# Patient Record
Sex: FEMALE | Race: WHITE | NOT HISPANIC OR LATINO | Employment: OTHER | URBAN - METROPOLITAN AREA
[De-identification: names, ages, dates, MRNs, and addresses within clinical notes are randomized per-mention and may not be internally consistent; named-entity substitution may affect disease eponyms.]

---

## 2017-04-04 ENCOUNTER — COMMUNICATION - HEALTHEAST (OUTPATIENT)
Dept: FAMILY MEDICINE | Facility: CLINIC | Age: 62
End: 2017-04-04

## 2017-06-05 ENCOUNTER — RECORDS - HEALTHEAST (OUTPATIENT)
Dept: ADMINISTRATIVE | Facility: OTHER | Age: 62
End: 2017-06-05

## 2017-06-19 ENCOUNTER — HOSPITAL ENCOUNTER (OUTPATIENT)
Dept: MAMMOGRAPHY | Facility: HOSPITAL | Age: 62
Discharge: HOME | End: 2017-06-19
Attending: OBSTETRICS & GYNECOLOGY

## 2017-06-19 DIAGNOSIS — Z12.31 VISIT FOR SCREENING MAMMOGRAM: ICD-10-CM

## 2017-06-20 ENCOUNTER — COMMUNICATION - HEALTHEAST (OUTPATIENT)
Dept: TELEHEALTH | Facility: CLINIC | Age: 62
End: 2017-06-20

## 2017-06-20 ENCOUNTER — OFFICE VISIT - HEALTHEAST (OUTPATIENT)
Dept: FAMILY MEDICINE | Facility: CLINIC | Age: 62
End: 2017-06-20

## 2017-06-20 ENCOUNTER — RECORDS - HEALTHEAST (OUTPATIENT)
Dept: ADMINISTRATIVE | Facility: OTHER | Age: 62
End: 2017-06-20

## 2017-06-20 ENCOUNTER — COMMUNICATION - HEALTHEAST (OUTPATIENT)
Dept: FAMILY MEDICINE | Facility: CLINIC | Age: 62
End: 2017-06-20

## 2017-06-20 DIAGNOSIS — Z13.0 SCREENING, ANEMIA, DEFICIENCY, IRON: ICD-10-CM

## 2017-06-20 DIAGNOSIS — Z13.29 SCREENING FOR THYROID DISORDER: ICD-10-CM

## 2017-06-20 DIAGNOSIS — Z13.228 SCREENING FOR ENDOCRINE, METABOLIC AND IMMUNITY DISORDER: ICD-10-CM

## 2017-06-20 DIAGNOSIS — E87.8 HYPERCHLOREMIA: ICD-10-CM

## 2017-06-20 DIAGNOSIS — Z98.890 H/O COLONOSCOPY: ICD-10-CM

## 2017-06-20 DIAGNOSIS — B02.9 SHINGLES: ICD-10-CM

## 2017-06-20 DIAGNOSIS — E87.5 HYPERKALEMIA: ICD-10-CM

## 2017-06-20 DIAGNOSIS — Z13.0 SCREENING FOR ENDOCRINE, METABOLIC AND IMMUNITY DISORDER: ICD-10-CM

## 2017-06-20 DIAGNOSIS — Z78.0 MENOPAUSE: ICD-10-CM

## 2017-06-20 DIAGNOSIS — Z13.220 LIPID SCREENING: ICD-10-CM

## 2017-06-20 DIAGNOSIS — Z00.00 PREVENTATIVE HEALTH CARE: ICD-10-CM

## 2017-06-20 DIAGNOSIS — Z13.220 SCREENING, LIPID: ICD-10-CM

## 2017-06-20 DIAGNOSIS — Z13.29 SCREENING FOR ENDOCRINE, METABOLIC AND IMMUNITY DISORDER: ICD-10-CM

## 2017-06-20 DIAGNOSIS — M85.80 OSTEOPENIA: ICD-10-CM

## 2017-06-20 DIAGNOSIS — Z12.4 SCREENING FOR MALIGNANT NEOPLASM OF CERVIX: ICD-10-CM

## 2017-06-20 DIAGNOSIS — Z90.710 S/P HYSTERECTOMY: ICD-10-CM

## 2017-06-20 LAB
CHOLEST SERPL-MCNC: 204 MG/DL
FASTING STATUS PATIENT QL REPORTED: YES
HDLC SERPL-MCNC: 58 MG/DL
LDLC SERPL CALC-MCNC: 128 MG/DL
TRIGL SERPL-MCNC: 92 MG/DL

## 2017-06-20 ASSESSMENT — MIFFLIN-ST. JEOR: SCORE: 1281.17

## 2017-06-20 NOTE — ASSESSMENT & PLAN NOTE
She reports colonoscopy done Aug 2016 and states that she needs to get a colonoscopy every 5 years.

## 2017-06-20 NOTE — ASSESSMENT & PLAN NOTE
Pap: several years ago, results were normal.   Mammo: done yesterday, result pending  Colonoscopy: q 5 yr. Done aug 2016   Std testing desired:  offered  Osteoporosis prevention discussed.  vitamin d levels ordered. Recommend daily calcium and vitamin d intake to keep good bone health. Recommend weight bearing exercise, no tobacco, and limit alcohol  dexa recommended as none on file.   Recommend sunscreen, exercise, & healthy diet.  Offered tsh, glucose, hgb, lipid  I have had an Advance Directives discussion with the patient.   Body mass index is 26.79 kg/(m^2).   mychart offered.

## 2017-06-21 ENCOUNTER — HOSPITAL ENCOUNTER (OUTPATIENT)
Dept: MAMMOGRAPHY | Facility: HOSPITAL | Age: 62
Discharge: HOME | End: 2017-06-21
Attending: OBSTETRICS & GYNECOLOGY

## 2017-06-21 DIAGNOSIS — N64.89 BREAST ASYMMETRY: ICD-10-CM

## 2017-06-22 ENCOUNTER — COMMUNICATION - HEALTHEAST (OUTPATIENT)
Dept: FAMILY MEDICINE | Facility: CLINIC | Age: 62
End: 2017-06-22

## 2017-07-10 ENCOUNTER — COMMUNICATION - HEALTHEAST (OUTPATIENT)
Dept: FAMILY MEDICINE | Facility: CLINIC | Age: 62
End: 2017-07-10

## 2017-07-10 ENCOUNTER — AMBULATORY - HEALTHEAST (OUTPATIENT)
Dept: LAB | Facility: CLINIC | Age: 62
End: 2017-07-10

## 2017-07-10 DIAGNOSIS — E87.8 HYPERCHLOREMIA: ICD-10-CM

## 2017-07-11 ENCOUNTER — RECORDS - HEALTHEAST (OUTPATIENT)
Dept: BONE DENSITY | Facility: CLINIC | Age: 62
End: 2017-07-11

## 2017-07-11 ENCOUNTER — RECORDS - HEALTHEAST (OUTPATIENT)
Dept: ADMINISTRATIVE | Facility: OTHER | Age: 62
End: 2017-07-11

## 2017-07-11 DIAGNOSIS — Z78.0 ASYMPTOMATIC MENOPAUSAL STATE: ICD-10-CM

## 2017-07-20 ENCOUNTER — COMMUNICATION - HEALTHEAST (OUTPATIENT)
Dept: FAMILY MEDICINE | Facility: CLINIC | Age: 62
End: 2017-07-20

## 2018-03-02 ENCOUNTER — RECORDS - HEALTHEAST (OUTPATIENT)
Dept: ADMINISTRATIVE | Facility: OTHER | Age: 63
End: 2018-03-02

## 2018-03-05 ENCOUNTER — RECORDS - HEALTHEAST (OUTPATIENT)
Dept: ADMINISTRATIVE | Facility: OTHER | Age: 63
End: 2018-03-05

## 2018-03-14 ENCOUNTER — RECORDS - HEALTHEAST (OUTPATIENT)
Dept: ADMINISTRATIVE | Facility: OTHER | Age: 63
End: 2018-03-14

## 2018-04-02 ENCOUNTER — RECORDS - HEALTHEAST (OUTPATIENT)
Dept: ADMINISTRATIVE | Facility: OTHER | Age: 63
End: 2018-04-02

## 2018-05-10 ENCOUNTER — RECORDS - HEALTHEAST (OUTPATIENT)
Dept: ADMINISTRATIVE | Facility: OTHER | Age: 63
End: 2018-05-10

## 2018-05-31 ENCOUNTER — RECORDS - HEALTHEAST (OUTPATIENT)
Dept: ADMINISTRATIVE | Facility: OTHER | Age: 63
End: 2018-05-31

## 2018-06-21 ENCOUNTER — RECORDS - HEALTHEAST (OUTPATIENT)
Dept: ADMINISTRATIVE | Facility: OTHER | Age: 63
End: 2018-06-21

## 2018-07-20 ENCOUNTER — RECORDS - HEALTHEAST (OUTPATIENT)
Dept: ADMINISTRATIVE | Facility: OTHER | Age: 63
End: 2018-07-20

## 2018-07-27 ENCOUNTER — RECORDS - HEALTHEAST (OUTPATIENT)
Dept: ADMINISTRATIVE | Facility: OTHER | Age: 63
End: 2018-07-27

## 2018-08-27 ENCOUNTER — RECORDS - HEALTHEAST (OUTPATIENT)
Dept: ADMINISTRATIVE | Facility: OTHER | Age: 63
End: 2018-08-27

## 2018-09-05 ENCOUNTER — RECORDS - HEALTHEAST (OUTPATIENT)
Dept: ADMINISTRATIVE | Facility: OTHER | Age: 63
End: 2018-09-05

## 2018-09-22 ENCOUNTER — HOSPITAL ENCOUNTER (OUTPATIENT)
Dept: MAMMOGRAPHY | Facility: CLINIC | Age: 63
Discharge: HOME | End: 2018-09-22
Attending: OBSTETRICS & GYNECOLOGY

## 2018-09-22 DIAGNOSIS — Z12.31 VISIT FOR SCREENING MAMMOGRAM: ICD-10-CM

## 2018-09-24 ENCOUNTER — RECORDS - HEALTHEAST (OUTPATIENT)
Dept: ADMINISTRATIVE | Facility: OTHER | Age: 63
End: 2018-09-24

## 2018-09-25 ENCOUNTER — HOSPITAL ENCOUNTER (OUTPATIENT)
Dept: MAMMOGRAPHY | Facility: CLINIC | Age: 63
Discharge: HOME | End: 2018-09-25
Attending: OBSTETRICS & GYNECOLOGY

## 2018-09-25 DIAGNOSIS — R92.30 BREAST DENSITY: ICD-10-CM

## 2018-10-17 ENCOUNTER — RECORDS - HEALTHEAST (OUTPATIENT)
Dept: ADMINISTRATIVE | Facility: OTHER | Age: 63
End: 2018-10-17

## 2018-10-30 ENCOUNTER — OFFICE VISIT - HEALTHEAST (OUTPATIENT)
Dept: FAMILY MEDICINE | Facility: CLINIC | Age: 63
End: 2018-10-30

## 2018-10-30 DIAGNOSIS — M85.80 OSTEOPENIA: ICD-10-CM

## 2018-10-30 DIAGNOSIS — Z13.0 SCREENING FOR ENDOCRINE, NUTRITIONAL, METABOLIC AND IMMUNITY DISORDER: ICD-10-CM

## 2018-10-30 DIAGNOSIS — K76.0 HEPATIC STEATOSIS: ICD-10-CM

## 2018-10-30 DIAGNOSIS — Z13.29 SCREENING FOR ENDOCRINE, NUTRITIONAL, METABOLIC AND IMMUNITY DISORDER: ICD-10-CM

## 2018-10-30 DIAGNOSIS — Z00.00 PREVENTATIVE HEALTH CARE: ICD-10-CM

## 2018-10-30 DIAGNOSIS — Z13.0 SCREENING, ANEMIA, DEFICIENCY, IRON: ICD-10-CM

## 2018-10-30 DIAGNOSIS — Z13.228 SCREENING FOR METABOLIC DISORDER: ICD-10-CM

## 2018-10-30 DIAGNOSIS — R63.5 ABNORMAL WEIGHT GAIN: ICD-10-CM

## 2018-10-30 DIAGNOSIS — Z13.21 SCREENING FOR ENDOCRINE, NUTRITIONAL, METABOLIC AND IMMUNITY DISORDER: ICD-10-CM

## 2018-10-30 DIAGNOSIS — Z13.220 SCREENING, LIPID: ICD-10-CM

## 2018-10-30 DIAGNOSIS — Z23 IMMUNIZATION DUE: ICD-10-CM

## 2018-10-30 DIAGNOSIS — Z98.890 H/O COLONOSCOPY: ICD-10-CM

## 2018-10-30 DIAGNOSIS — J30.9 ALLERGIC RHINITIS, UNSPECIFIED SEASONALITY, UNSPECIFIED TRIGGER: ICD-10-CM

## 2018-10-30 DIAGNOSIS — M25.532 LEFT WRIST PAIN: ICD-10-CM

## 2018-10-30 DIAGNOSIS — E87.8 HYPERCHLOREMIA: ICD-10-CM

## 2018-10-30 DIAGNOSIS — Z13.228 SCREENING FOR ENDOCRINE, NUTRITIONAL, METABOLIC AND IMMUNITY DISORDER: ICD-10-CM

## 2018-10-30 LAB
ALBUMIN SERPL-MCNC: 4 G/DL (ref 3.5–5)
ALP SERPL-CCNC: 71 U/L (ref 45–120)
ALT SERPL W P-5'-P-CCNC: <9 U/L (ref 0–45)
ANION GAP SERPL CALCULATED.3IONS-SCNC: 12 MMOL/L (ref 5–18)
AST SERPL W P-5'-P-CCNC: 15 U/L (ref 0–40)
BASOPHILS # BLD AUTO: 0.1 THOU/UL (ref 0–0.2)
BASOPHILS NFR BLD AUTO: 1 % (ref 0–2)
BILIRUB SERPL-MCNC: 0.3 MG/DL (ref 0–1)
BUN SERPL-MCNC: 18 MG/DL (ref 8–22)
CALCIUM SERPL-MCNC: 10 MG/DL (ref 8.5–10.5)
CHLORIDE BLD-SCNC: 107 MMOL/L (ref 98–107)
CHOLEST SERPL-MCNC: 249 MG/DL
CO2 SERPL-SCNC: 22 MMOL/L (ref 22–31)
CREAT SERPL-MCNC: 0.67 MG/DL (ref 0.6–1.1)
EOSINOPHIL # BLD AUTO: 0.1 THOU/UL (ref 0–0.4)
EOSINOPHIL NFR BLD AUTO: 1 % (ref 0–6)
ERYTHROCYTE [DISTWIDTH] IN BLOOD BY AUTOMATED COUNT: 11.4 % (ref 11–14.5)
FASTING STATUS PATIENT QL REPORTED: YES
GFR SERPL CREATININE-BSD FRML MDRD: >60 ML/MIN/1.73M2
GLUCOSE BLD-MCNC: 117 MG/DL (ref 70–125)
HCT VFR BLD AUTO: 41.7 % (ref 35–47)
HDLC SERPL-MCNC: 79 MG/DL
HGB BLD-MCNC: 13.7 G/DL (ref 12–16)
LDLC SERPL CALC-MCNC: 157 MG/DL
LYMPHOCYTES # BLD AUTO: 1.5 THOU/UL (ref 0.8–4.4)
LYMPHOCYTES NFR BLD AUTO: 20 % (ref 20–40)
MCH RBC QN AUTO: 29.4 PG (ref 27–34)
MCHC RBC AUTO-ENTMCNC: 32.8 G/DL (ref 32–36)
MCV RBC AUTO: 89 FL (ref 80–100)
MONOCYTES # BLD AUTO: 0.3 THOU/UL (ref 0–0.9)
MONOCYTES NFR BLD AUTO: 4 % (ref 2–10)
NEUTROPHILS # BLD AUTO: 5.6 THOU/UL (ref 2–7.7)
NEUTROPHILS NFR BLD AUTO: 75 % (ref 50–70)
PLATELET # BLD AUTO: 338 THOU/UL (ref 140–440)
PMV BLD AUTO: 6.7 FL (ref 7–10)
POTASSIUM BLD-SCNC: 4.8 MMOL/L (ref 3.5–5)
PROT SERPL-MCNC: 7.3 G/DL (ref 6–8)
RBC # BLD AUTO: 4.66 MILL/UL (ref 3.8–5.4)
SODIUM SERPL-SCNC: 141 MMOL/L (ref 136–145)
TRIGL SERPL-MCNC: 64 MG/DL
TSH SERPL DL<=0.005 MIU/L-ACNC: 0.67 UIU/ML (ref 0.3–5)
WBC: 7.6 THOU/UL (ref 4–11)

## 2018-10-30 ASSESSMENT — MIFFLIN-ST. JEOR: SCORE: 1290.24

## 2018-10-30 NOTE — ASSESSMENT & PLAN NOTE
Colonoscopy done in August 2016 and was normal.  It was recommended that it be repeated in 5 years-August 2021.

## 2018-10-30 NOTE — ASSESSMENT & PLAN NOTE
She uses Zyrtec as needed.  Currently she is taking prednisone and so she has not been using her Zyrtec because the prednisone works even better than the Zyrtec.  We talked about the fact that Zyrtec is weight positive and that she should use it sparingly.  An alternative weight neutral medication would be a saline nasal rinse that may be adequate for her symptoms.  She will keep this in mind.

## 2018-10-30 NOTE — ASSESSMENT & PLAN NOTE
Flu shot -recommended  Pap: -She had a hysterectomy due to uterine prolapse so no Pap smear is needed peer  Mammo: She had a normal mammogram in August 2018.  She sees Dr. Baker for all her gynecological and breast needs.  Colonoscopy: Colonoscopy is due in August 2021.  Std testing desired: Declined-offered  Osteoporosis prevention discussed.  vitamin d levels ordered. Recommend daily calcium and vitamin d intake to keep good bone health. Recommend weight bearing exercise, no tobacco, and limit alcohol  dexa -see osteopenia in the problem list  Recommend sunscreen, exercise, & healthy diet.  Offered tsh, glucose, hgb, lipid  I have had an Advance Directives discussion with the patient.   Body mass index is 27.12 kg/(m^2).   mychart offered.

## 2018-10-30 NOTE — ASSESSMENT & PLAN NOTE
On chart review today I do see an ultrasound that reveals hepatic steatosis.  I have recommended that she increase vegetable intake and decrease sugar and processed food intake to try to improvethis.  In addition because she has been gaining weight on gabapentin, prednisone and as needed Zyrtec I have recommended that she use some off label metformin to baldemar to the weight gain with these drugs.

## 2018-10-31 ENCOUNTER — COMMUNICATION - HEALTHEAST (OUTPATIENT)
Dept: FAMILY MEDICINE | Facility: CLINIC | Age: 63
End: 2018-10-31

## 2018-10-31 ENCOUNTER — AMBULATORY - HEALTHEAST (OUTPATIENT)
Dept: NURSING | Facility: CLINIC | Age: 63
End: 2018-10-31

## 2018-10-31 DIAGNOSIS — Z23 NEED FOR VACCINATION: ICD-10-CM

## 2018-10-31 LAB — 25(OH)D3 SERPL-MCNC: 33 NG/ML (ref 30–80)

## 2018-11-27 ENCOUNTER — OFFICE VISIT - HEALTHEAST (OUTPATIENT)
Dept: FAMILY MEDICINE | Facility: CLINIC | Age: 63
End: 2018-11-27

## 2018-11-27 DIAGNOSIS — G89.29 CHRONIC RIGHT-SIDED THORACIC BACK PAIN: ICD-10-CM

## 2018-11-27 DIAGNOSIS — M54.6 CHRONIC RIGHT-SIDED THORACIC BACK PAIN: ICD-10-CM

## 2018-11-27 DIAGNOSIS — E87.8 HYPERCHLOREMIA: ICD-10-CM

## 2018-11-27 DIAGNOSIS — Z00.00 PREVENTATIVE HEALTH CARE: ICD-10-CM

## 2018-11-27 DIAGNOSIS — Z23 NEED FOR SHINGLES VACCINE: ICD-10-CM

## 2018-11-27 DIAGNOSIS — K76.0 HEPATIC STEATOSIS: ICD-10-CM

## 2018-11-27 DIAGNOSIS — E78.5 DYSLIPIDEMIA: ICD-10-CM

## 2018-11-27 NOTE — ASSESSMENT & PLAN NOTE
Lipids reviewed. Statin decision aid discussed. She could reduce her risk from 3% in 10 years to 2% in 10 years with high dose statin.At this point will watch and wait.

## 2018-11-27 NOTE — ASSESSMENT & PLAN NOTE
Labs reviewed today: 10/2018 d 33, tsh nl, cmp nl, tot chol 249, tg 64, hdl 79, ld 157. cbc ess. nl.

## 2018-11-27 NOTE — ASSESSMENT & PLAN NOTE
Labs from 10/2018 reviewed and despite the ultrasound that showed hepatic steatosis from 2016, the lfts remain normal. Will continue tomonitor and encourage healthy lifestyle.

## 2018-12-07 ENCOUNTER — OFFICE VISIT - HEALTHEAST (OUTPATIENT)
Dept: PHYSICAL THERAPY | Facility: REHABILITATION | Age: 63
End: 2018-12-07

## 2018-12-07 DIAGNOSIS — M25.69 DECREASED RANGE OF MOTION OF TRUNK AND BACK: ICD-10-CM

## 2018-12-07 DIAGNOSIS — M62.81 GENERALIZED MUSCLE WEAKNESS: ICD-10-CM

## 2018-12-07 DIAGNOSIS — M54.6 ACUTE RIGHT-SIDED THORACIC BACK PAIN: ICD-10-CM

## 2019-01-26 ENCOUNTER — COMMUNICATION - HEALTHEAST (OUTPATIENT)
Dept: FAMILY MEDICINE | Facility: CLINIC | Age: 64
End: 2019-01-26

## 2019-01-26 DIAGNOSIS — R63.5 ABNORMAL WEIGHT GAIN: ICD-10-CM

## 2019-04-04 ENCOUNTER — OFFICE VISIT - HEALTHEAST (OUTPATIENT)
Dept: FAMILY MEDICINE | Facility: CLINIC | Age: 64
End: 2019-04-04

## 2019-04-04 DIAGNOSIS — R63.5 ABNORMAL WEIGHT GAIN: ICD-10-CM

## 2019-04-04 DIAGNOSIS — G25.81 RESTLESS LEGS SYNDROME: ICD-10-CM

## 2019-04-04 LAB
BASOPHILS # BLD AUTO: 0.1 THOU/UL (ref 0–0.2)
BASOPHILS NFR BLD AUTO: 1 % (ref 0–2)
EOSINOPHIL # BLD AUTO: 0.3 THOU/UL (ref 0–0.4)
EOSINOPHIL NFR BLD AUTO: 4 % (ref 0–6)
ERYTHROCYTE [DISTWIDTH] IN BLOOD BY AUTOMATED COUNT: 11.6 % (ref 11–14.5)
FERRITIN SERPL-MCNC: 77 NG/ML (ref 10–130)
HCT VFR BLD AUTO: 43.7 % (ref 35–47)
HGB BLD-MCNC: 14.4 G/DL (ref 12–16)
LYMPHOCYTES # BLD AUTO: 2.4 THOU/UL (ref 0.8–4.4)
LYMPHOCYTES NFR BLD AUTO: 33 % (ref 20–40)
MCH RBC QN AUTO: 29.7 PG (ref 27–34)
MCHC RBC AUTO-ENTMCNC: 33 G/DL (ref 32–36)
MCV RBC AUTO: 90 FL (ref 80–100)
MONOCYTES # BLD AUTO: 0.6 THOU/UL (ref 0–0.9)
MONOCYTES NFR BLD AUTO: 8 % (ref 2–10)
NEUTROPHILS # BLD AUTO: 3.9 THOU/UL (ref 2–7.7)
NEUTROPHILS NFR BLD AUTO: 55 % (ref 50–70)
PLATELET # BLD AUTO: 294 THOU/UL (ref 140–440)
PMV BLD AUTO: 7.2 FL (ref 7–10)
RBC # BLD AUTO: 4.85 MILL/UL (ref 3.8–5.4)
WBC: 7.1 THOU/UL (ref 4–11)

## 2019-04-04 NOTE — ASSESSMENT & PLAN NOTE
Will check cbc and ferritin as low iron stores and ferritin less than 50 is the only test known to correlate with restless legs per [email or web address]. Discussed risks and benefits of mirapex.  She would like to try mirapex.     She did also mention that when she is more active during the day she feels like she has less problems with the restless legs at night and so she is going to try to make sure she gets 10,000 steps every day and see if that helpful before she starts the Mirapex.    Follow-up in 1 month to see how she is doing with restless legs.  We could also consult the sleep specialist if this is an ongoing problem.

## 2019-04-11 ENCOUNTER — COMMUNICATION - HEALTHEAST (OUTPATIENT)
Dept: FAMILY MEDICINE | Facility: CLINIC | Age: 64
End: 2019-04-11

## 2019-04-26 ENCOUNTER — COMMUNICATION - HEALTHEAST (OUTPATIENT)
Dept: FAMILY MEDICINE | Facility: CLINIC | Age: 64
End: 2019-04-26

## 2019-04-26 DIAGNOSIS — G25.81 RESTLESS LEGS SYNDROME: ICD-10-CM

## 2019-04-29 ENCOUNTER — COMMUNICATION - HEALTHEAST (OUTPATIENT)
Dept: FAMILY MEDICINE | Facility: CLINIC | Age: 64
End: 2019-04-29

## 2019-04-29 DIAGNOSIS — G25.81 RESTLESS LEGS SYNDROME: ICD-10-CM

## 2019-06-25 ENCOUNTER — OFFICE VISIT - HEALTHEAST (OUTPATIENT)
Dept: FAMILY MEDICINE | Facility: CLINIC | Age: 64
End: 2019-06-25

## 2019-06-25 DIAGNOSIS — K76.0 HEPATIC STEATOSIS: ICD-10-CM

## 2019-06-25 DIAGNOSIS — M54.6 CHRONIC RIGHT-SIDED THORACIC BACK PAIN: ICD-10-CM

## 2019-06-25 DIAGNOSIS — R63.5 ABNORMAL WEIGHT GAIN: ICD-10-CM

## 2019-06-25 DIAGNOSIS — G25.81 RESTLESS LEGS SYNDROME: ICD-10-CM

## 2019-06-25 DIAGNOSIS — G89.29 CHRONIC RIGHT-SIDED THORACIC BACK PAIN: ICD-10-CM

## 2019-06-25 DIAGNOSIS — E78.5 DYSLIPIDEMIA: ICD-10-CM

## 2019-06-25 NOTE — ASSESSMENT & PLAN NOTE
She has lost weight.  Weight has intentionally come down from 163 in October 2000 18-1 52 today with decreasing sugar intake and increasing activity levels.

## 2019-06-25 NOTE — ASSESSMENT & PLAN NOTE
She is attempting weight loss on her own.  Decreasing her sugar levels and increasing her physical activity.  She has successfully lost almost 10 pounds since October.  This should help her hepatic steatosis.  We did review her LFTs which have been normal.

## 2019-07-01 ENCOUNTER — AMBULATORY - HEALTHEAST (OUTPATIENT)
Dept: FAMILY MEDICINE | Facility: CLINIC | Age: 64
End: 2019-07-01

## 2019-07-01 DIAGNOSIS — M85.80 OSTEOPENIA, UNSPECIFIED LOCATION: ICD-10-CM

## 2019-08-26 ENCOUNTER — AMBULATORY - HEALTHEAST (OUTPATIENT)
Dept: SCHEDULING | Facility: CLINIC | Age: 64
End: 2019-08-26

## 2019-08-26 ENCOUNTER — AMBULATORY - HEALTHEAST (OUTPATIENT)
Dept: FAMILY MEDICINE | Facility: CLINIC | Age: 64
End: 2019-08-26

## 2019-08-26 DIAGNOSIS — M85.80 OSTEOPENIA, UNSPECIFIED LOCATION: ICD-10-CM

## 2019-10-23 ENCOUNTER — OFFICE VISIT - HEALTHEAST (OUTPATIENT)
Dept: INTERNAL MEDICINE | Facility: CLINIC | Age: 64
End: 2019-10-23

## 2019-10-23 ENCOUNTER — RECORDS - HEALTHEAST (OUTPATIENT)
Dept: ADMINISTRATIVE | Facility: OTHER | Age: 64
End: 2019-10-23

## 2019-10-23 DIAGNOSIS — S62.102D CLOSED FRACTURE OF LEFT WRIST WITH ROUTINE HEALING, SUBSEQUENT ENCOUNTER: ICD-10-CM

## 2019-10-23 DIAGNOSIS — M81.0 SENILE OSTEOPOROSIS: ICD-10-CM

## 2019-10-23 LAB
ALBUMIN SERPL-MCNC: 4.2 G/DL (ref 3.5–5)
ALP SERPL-CCNC: 101 U/L (ref 45–120)
ALT SERPL W P-5'-P-CCNC: 13 U/L (ref 0–45)
ANION GAP SERPL CALCULATED.3IONS-SCNC: 12 MMOL/L (ref 5–18)
AST SERPL W P-5'-P-CCNC: 26 U/L (ref 0–40)
BILIRUB SERPL-MCNC: 0.3 MG/DL (ref 0–1)
BUN SERPL-MCNC: 11 MG/DL (ref 8–22)
CALCIUM SERPL-MCNC: 10.3 MG/DL (ref 8.5–10.5)
CALCIUM SERPL-MCNC: 10.4 MG/DL (ref 8.5–10.5)
CHLORIDE BLD-SCNC: 103 MMOL/L (ref 98–107)
CO2 SERPL-SCNC: 25 MMOL/L (ref 22–31)
CREAT SERPL-MCNC: 0.71 MG/DL (ref 0.6–1.1)
CREAT SERPL-MCNC: 0.74 MG/DL (ref 0.6–1.1)
GFR SERPL CREATININE-BSD FRML MDRD: >60 ML/MIN/1.73M2
GFR SERPL CREATININE-BSD FRML MDRD: >60 ML/MIN/1.73M2
GLUCOSE BLD-MCNC: 96 MG/DL (ref 70–125)
MAGNESIUM SERPL-MCNC: 2.2 MG/DL (ref 1.8–2.6)
PHOSPHATE SERPL-MCNC: 4.2 MG/DL (ref 2.5–4.5)
POTASSIUM BLD-SCNC: 4.7 MMOL/L (ref 3.5–5)
PROT SERPL-MCNC: 7.6 G/DL (ref 6–8)
PTH-INTACT SERPL-MCNC: 40 PG/ML (ref 10–86)
SODIUM SERPL-SCNC: 140 MMOL/L (ref 136–145)
TSH SERPL DL<=0.005 MIU/L-ACNC: 0.9 UIU/ML (ref 0.3–5)

## 2019-10-24 ENCOUNTER — COMMUNICATION - HEALTHEAST (OUTPATIENT)
Dept: INTERNAL MEDICINE | Facility: CLINIC | Age: 64
End: 2019-10-24

## 2019-10-24 LAB
25(OH)D3 SERPL-MCNC: 36.9 NG/ML (ref 30–80)
25(OH)D3 SERPL-MCNC: 36.9 NG/ML (ref 30–80)
GLIADIN IGA SER-ACNC: 1.3 U/ML
GLIADIN IGG SER-ACNC: 0.5 U/ML
IGA SERPL-MCNC: 327 MG/DL (ref 65–400)
TTG IGA SER-ACNC: 0.6 U/ML
TTG IGG SER-ACNC: <0.6 U/ML

## 2019-10-25 LAB
ALBUMIN PERCENT: 61.4 % (ref 51–67)
ALBUMIN SERPL ELPH-MCNC: 4.6 G/DL (ref 3.2–4.7)
ALP BONE SERPL-MCNC: 15.9 UG/L
ALPHA 1 PERCENT: 3 % (ref 2–4)
ALPHA 2 PERCENT: 10.9 % (ref 5–13)
ALPHA1 GLOB SERPL ELPH-MCNC: 0.2 G/DL (ref 0.1–0.3)
ALPHA2 GLOB SERPL ELPH-MCNC: 0.8 G/DL (ref 0.4–0.9)
B-GLOBULIN SERPL ELPH-MCNC: 1 G/DL (ref 0.7–1.2)
BETA PERCENT: 12.9 % (ref 10–17)
GAMMA GLOB SERPL ELPH-MCNC: 0.9 G/DL (ref 0.6–1.4)
GAMMA GLOBULIN PERCENT: 11.8 % (ref 9–20)
PATH ICD:: NORMAL
PROT PATTERN SERPL ELPH-IMP: NORMAL
PROT SERPL-MCNC: 7.5 G/DL (ref 6–8)
REVIEWING PATHOLOGIST: NORMAL

## 2019-10-28 LAB
PATH ICD:: NORMAL
PROT PATTERN SERPL ELPH-IMP: NORMAL
REVIEWING PATHOLOGIST: NORMAL
TOTAL PROTEIN RANDOM URINE MG/DL: <7 MG/DL

## 2019-10-31 ENCOUNTER — AMBULATORY - HEALTHEAST (OUTPATIENT)
Dept: LAB | Facility: CLINIC | Age: 64
End: 2019-10-31

## 2019-10-31 DIAGNOSIS — M81.0 SENILE OSTEOPOROSIS: ICD-10-CM

## 2019-10-31 DIAGNOSIS — S62.102D CLOSED FRACTURE OF LEFT WRIST WITH ROUTINE HEALING, SUBSEQUENT ENCOUNTER: ICD-10-CM

## 2019-10-31 LAB
CALCIUM 24H UR-MRATE: 150 MG/24HR (ref 20–275)
CALCIUM UR-MCNC: 6.4 MG/DL
SPECIMEN VOL UR: 2350 ML

## 2019-11-07 ENCOUNTER — HOSPITAL ENCOUNTER (OUTPATIENT)
Dept: MAMMOGRAPHY | Facility: CLINIC | Age: 64
Discharge: HOME | End: 2019-11-07
Attending: OBSTETRICS & GYNECOLOGY

## 2019-11-07 DIAGNOSIS — Z12.31 VISIT FOR SCREENING MAMMOGRAM: ICD-10-CM

## 2019-11-08 ENCOUNTER — OFFICE VISIT - HEALTHEAST (OUTPATIENT)
Dept: FAMILY MEDICINE | Facility: CLINIC | Age: 64
End: 2019-11-08

## 2019-11-08 DIAGNOSIS — M54.50 LUMBAR BACK PAIN: ICD-10-CM

## 2019-11-08 DIAGNOSIS — S29.019A THORACIC MYOFASCIAL STRAIN, INITIAL ENCOUNTER: ICD-10-CM

## 2019-11-08 DIAGNOSIS — S39.012A STRAIN OF LUMBAR REGION, INITIAL ENCOUNTER: ICD-10-CM

## 2019-11-08 DIAGNOSIS — M81.0 SENILE OSTEOPOROSIS: ICD-10-CM

## 2019-11-08 DIAGNOSIS — M54.6 ACUTE MIDLINE THORACIC BACK PAIN: ICD-10-CM

## 2019-11-08 ASSESSMENT — MIFFLIN-ST. JEOR: SCORE: 1226.4

## 2019-11-26 ENCOUNTER — OFFICE VISIT - HEALTHEAST (OUTPATIENT)
Dept: FAMILY MEDICINE | Facility: CLINIC | Age: 64
End: 2019-11-26

## 2019-11-26 DIAGNOSIS — Z11.59 ENCOUNTER FOR HEPATITIS C SCREENING TEST FOR LOW RISK PATIENT: ICD-10-CM

## 2019-11-26 DIAGNOSIS — J30.9 ALLERGIC RHINITIS, UNSPECIFIED SEASONALITY, UNSPECIFIED TRIGGER: ICD-10-CM

## 2019-11-26 DIAGNOSIS — E78.5 DYSLIPIDEMIA: ICD-10-CM

## 2019-11-26 DIAGNOSIS — G25.81 RESTLESS LEGS SYNDROME: ICD-10-CM

## 2019-11-26 DIAGNOSIS — M25.532 LEFT WRIST PAIN: ICD-10-CM

## 2019-11-26 DIAGNOSIS — Z85.828 HISTORY OF BASAL CELL CARCINOMA: ICD-10-CM

## 2019-11-26 DIAGNOSIS — K76.0 HEPATIC STEATOSIS: ICD-10-CM

## 2019-11-26 DIAGNOSIS — Z13.0 SCREENING, ANEMIA, DEFICIENCY, IRON: ICD-10-CM

## 2019-11-26 DIAGNOSIS — K57.30 DIVERTICULOSIS OF LARGE INTESTINE WITHOUT HEMORRHAGE: ICD-10-CM

## 2019-11-26 DIAGNOSIS — Z13.228 SCREENING FOR METABOLIC DISORDER: ICD-10-CM

## 2019-11-26 DIAGNOSIS — Z13.220 SCREENING, LIPID: ICD-10-CM

## 2019-11-26 DIAGNOSIS — Z98.890 H/O COLONOSCOPY: ICD-10-CM

## 2019-11-26 DIAGNOSIS — F41.9 ANXIETY: ICD-10-CM

## 2019-11-26 DIAGNOSIS — R10.32 ABDOMINAL PAIN, LEFT LOWER QUADRANT: ICD-10-CM

## 2019-11-26 DIAGNOSIS — M81.0 SENILE OSTEOPOROSIS: ICD-10-CM

## 2019-11-26 LAB
ALBUMIN SERPL-MCNC: 3.9 G/DL (ref 3.5–5)
ALP SERPL-CCNC: 85 U/L (ref 45–120)
ALT SERPL W P-5'-P-CCNC: <9 U/L (ref 0–45)
ANION GAP SERPL CALCULATED.3IONS-SCNC: 11 MMOL/L (ref 5–18)
AST SERPL W P-5'-P-CCNC: 17 U/L (ref 0–40)
BASOPHILS # BLD AUTO: 0.1 THOU/UL (ref 0–0.2)
BASOPHILS NFR BLD AUTO: 1 % (ref 0–2)
BILIRUB SERPL-MCNC: 0.3 MG/DL (ref 0–1)
BUN SERPL-MCNC: 8 MG/DL (ref 8–22)
CALCIUM SERPL-MCNC: 9.8 MG/DL (ref 8.5–10.5)
CHLORIDE BLD-SCNC: 105 MMOL/L (ref 98–107)
CHOLEST SERPL-MCNC: 203 MG/DL
CO2 SERPL-SCNC: 25 MMOL/L (ref 22–31)
CREAT SERPL-MCNC: 0.72 MG/DL (ref 0.6–1.1)
EOSINOPHIL # BLD AUTO: 0.4 THOU/UL (ref 0–0.4)
EOSINOPHIL NFR BLD AUTO: 6 % (ref 0–6)
ERYTHROCYTE [DISTWIDTH] IN BLOOD BY AUTOMATED COUNT: 10.8 % (ref 11–14.5)
FASTING STATUS PATIENT QL REPORTED: YES
GFR SERPL CREATININE-BSD FRML MDRD: >60 ML/MIN/1.73M2
GLUCOSE BLD-MCNC: 94 MG/DL (ref 70–125)
HCT VFR BLD AUTO: 37.4 % (ref 35–47)
HDLC SERPL-MCNC: 62 MG/DL
HGB BLD-MCNC: 12.8 G/DL (ref 12–16)
LDLC SERPL CALC-MCNC: 127 MG/DL
LYMPHOCYTES # BLD AUTO: 3.3 THOU/UL (ref 0.8–4.4)
LYMPHOCYTES NFR BLD AUTO: 46 % (ref 20–40)
MCH RBC QN AUTO: 30.4 PG (ref 27–34)
MCHC RBC AUTO-ENTMCNC: 34.2 G/DL (ref 32–36)
MCV RBC AUTO: 89 FL (ref 80–100)
MONOCYTES # BLD AUTO: 0.6 THOU/UL (ref 0–0.9)
MONOCYTES NFR BLD AUTO: 9 % (ref 2–10)
NEUTROPHILS # BLD AUTO: 2.8 THOU/UL (ref 2–7.7)
NEUTROPHILS NFR BLD AUTO: 39 % (ref 50–70)
PLATELET # BLD AUTO: 387 THOU/UL (ref 140–440)
PMV BLD AUTO: 6.9 FL (ref 7–10)
POTASSIUM BLD-SCNC: 4.1 MMOL/L (ref 3.5–5)
PROT SERPL-MCNC: 7 G/DL (ref 6–8)
RBC # BLD AUTO: 4.21 MILL/UL (ref 3.8–5.4)
SODIUM SERPL-SCNC: 141 MMOL/L (ref 136–145)
TRIGL SERPL-MCNC: 70 MG/DL
WBC: 7.2 THOU/UL (ref 4–11)

## 2019-11-26 RX ORDER — ESTRADIOL 0.1 MG/G
CREAM VAGINAL
Refills: 0 | Status: SHIPPED | COMMUNITY
Start: 2019-11-05 | End: 2024-06-05

## 2019-11-26 ASSESSMENT — ANXIETY QUESTIONNAIRES
GAD7 TOTAL SCORE: 11
7. FEELING AFRAID AS IF SOMETHING AWFUL MIGHT HAPPEN: SEVERAL DAYS
IF YOU CHECKED OFF ANY PROBLEMS ON THIS QUESTIONNAIRE, HOW DIFFICULT HAVE THESE PROBLEMS MADE IT FOR YOU TO DO YOUR WORK, TAKE CARE OF THINGS AT HOME, OR GET ALONG WITH OTHER PEOPLE: SOMEWHAT DIFFICULT
1. FEELING NERVOUS, ANXIOUS, OR ON EDGE: MORE THAN HALF THE DAYS
2. NOT BEING ABLE TO STOP OR CONTROL WORRYING: NOT AT ALL
4. TROUBLE RELAXING: MORE THAN HALF THE DAYS
5. BEING SO RESTLESS THAT IT IS HARD TO SIT STILL: MORE THAN HALF THE DAYS
3. WORRYING TOO MUCH ABOUT DIFFERENT THINGS: SEVERAL DAYS
6. BECOMING EASILY ANNOYED OR IRRITABLE: NEARLY EVERY DAY

## 2019-11-26 ASSESSMENT — MIFFLIN-ST. JEOR: SCORE: 1217.78

## 2019-11-26 NOTE — ASSESSMENT & PLAN NOTE
Patients says she saw GI in the remote past and GI once long ago suspected diverticulitis clinically based on diverticulosis on colonoscopy.      Today she says she thinks she might have a flare of diverticulitis.  She has been doing clear liquids and her sx got better but then yesterday she ate and the pain in the lower abdomen returned. No fever and the pain is bilateral in the lower abdomen.     Ct of abdomen recommended. I did offer to order stat but she declined stat saying she didn't plan to go right away as she thought maybe her sx would spontaneouslyresolve with clear liquids. Since she is no acutely ill now, I cannot disagree, but she is asked to seek medical attention immediately if her sx worsen

## 2019-11-26 NOTE — ASSESSMENT & PLAN NOTE
NEW PROBLEM TODAY  Discussed counseling and medications. She wants to try medications first and will consider counseling prn.  Recommend 1 month trial of zoloft 50 mg po q day (start w 25 mg po q day for the first 8d then increase to full dose).     Follow up in 1 month.

## 2019-11-26 NOTE — ASSESSMENT & PLAN NOTE
mirapex prn works, refilled. No side effectgs. Normal heart rate, no cns depression, no falls, no behavior changes, gets skin exam annually by derm.

## 2019-11-26 NOTE — ASSESSMENT & PLAN NOTE
Discussed statin decision aid. Statin would lower her cvd risk from 4% to 2%, but she declined at this time, understanding that she could lower her risk with this medication.     Will recheck lipids and repeat calculation if she desires.

## 2019-11-27 LAB — HCV AB SERPL QL IA: NEGATIVE

## 2019-12-18 ENCOUNTER — COMMUNICATION - HEALTHEAST (OUTPATIENT)
Dept: FAMILY MEDICINE | Facility: CLINIC | Age: 64
End: 2019-12-18

## 2019-12-18 DIAGNOSIS — F41.9 ANXIETY: ICD-10-CM

## 2020-01-06 ENCOUNTER — RECORDS - HEALTHEAST (OUTPATIENT)
Dept: ADMINISTRATIVE | Facility: OTHER | Age: 65
End: 2020-01-06

## 2020-01-15 ENCOUNTER — OFFICE VISIT - HEALTHEAST (OUTPATIENT)
Dept: FAMILY MEDICINE | Facility: CLINIC | Age: 65
End: 2020-01-15

## 2020-01-15 DIAGNOSIS — F41.9 ANXIETY: ICD-10-CM

## 2020-01-15 DIAGNOSIS — E78.5 DYSLIPIDEMIA: ICD-10-CM

## 2020-01-15 ASSESSMENT — ANXIETY QUESTIONNAIRES
2. NOT BEING ABLE TO STOP OR CONTROL WORRYING: SEVERAL DAYS
6. BECOMING EASILY ANNOYED OR IRRITABLE: MORE THAN HALF THE DAYS
1. FEELING NERVOUS, ANXIOUS, OR ON EDGE: NEARLY EVERY DAY
5. BEING SO RESTLESS THAT IT IS HARD TO SIT STILL: SEVERAL DAYS
7. FEELING AFRAID AS IF SOMETHING AWFUL MIGHT HAPPEN: SEVERAL DAYS
GAD7 TOTAL SCORE: 13
3. WORRYING TOO MUCH ABOUT DIFFERENT THINGS: MORE THAN HALF THE DAYS
4. TROUBLE RELAXING: NEARLY EVERY DAY
IF YOU CHECKED OFF ANY PROBLEMS ON THIS QUESTIONNAIRE, HOW DIFFICULT HAVE THESE PROBLEMS MADE IT FOR YOU TO DO YOUR WORK, TAKE CARE OF THINGS AT HOME, OR GET ALONG WITH OTHER PEOPLE: VERY DIFFICULT

## 2020-01-15 ASSESSMENT — MIFFLIN-ST. JEOR: SCORE: 1195.55

## 2020-01-15 ASSESSMENT — PATIENT HEALTH QUESTIONNAIRE - PHQ9: SUM OF ALL RESPONSES TO PHQ QUESTIONS 1-9: 6

## 2020-01-15 NOTE — ASSESSMENT & PLAN NOTE
Needs improvement, asks about PRN medication options for anxiety  Currently taking Zoloft 50 mg orally per day and feels that it is helpful but not enough.  I would like to avoid benzodiazepines if possible due to their addictive properties and because she is concurrently taking tramadol.  I have recommended as needed Vistaril  up to 3 times daily.  Plan follow-up in 1 month.  We may need to increase her Zoloft to 150 mg at that time.

## 2020-01-15 NOTE — ASSESSMENT & PLAN NOTE
Improved   Lab Results   Component Value Date    CHOL 203 (H) 11/26/2019    CHOL 249 (H) 10/30/2018    CHOL 204 (H) 06/20/2017     Lab Results   Component Value Date    HDL 62 11/26/2019    HDL 79 10/30/2018    HDL 58 06/20/2017     Lab Results   Component Value Date    LDLCALC 127 11/26/2019    LDLCALC 157 (H) 10/30/2018    LDLCALC 128 06/20/2017     Lab Results   Component Value Date    TRIG 70 11/26/2019    TRIG 64 10/30/2018    TRIG 92 06/20/2017     No components found for: CHOLHDL

## 2020-01-22 ENCOUNTER — COMMUNICATION - HEALTHEAST (OUTPATIENT)
Dept: FAMILY MEDICINE | Facility: CLINIC | Age: 65
End: 2020-01-22

## 2020-01-22 DIAGNOSIS — F41.9 ANXIETY: ICD-10-CM

## 2020-02-07 ENCOUNTER — COMMUNICATION - HEALTHEAST (OUTPATIENT)
Dept: FAMILY MEDICINE | Facility: CLINIC | Age: 65
End: 2020-02-07

## 2020-02-07 DIAGNOSIS — F41.9 ANXIETY: ICD-10-CM

## 2020-02-18 ENCOUNTER — OFFICE VISIT - HEALTHEAST (OUTPATIENT)
Dept: FAMILY MEDICINE | Facility: CLINIC | Age: 65
End: 2020-02-18

## 2020-02-18 DIAGNOSIS — F41.9 ANXIETY: ICD-10-CM

## 2020-02-18 ASSESSMENT — ANXIETY QUESTIONNAIRES
4. TROUBLE RELAXING: MORE THAN HALF THE DAYS
2. NOT BEING ABLE TO STOP OR CONTROL WORRYING: SEVERAL DAYS
7. FEELING AFRAID AS IF SOMETHING AWFUL MIGHT HAPPEN: SEVERAL DAYS
6. BECOMING EASILY ANNOYED OR IRRITABLE: SEVERAL DAYS
IF YOU CHECKED OFF ANY PROBLEMS ON THIS QUESTIONNAIRE, HOW DIFFICULT HAVE THESE PROBLEMS MADE IT FOR YOU TO DO YOUR WORK, TAKE CARE OF THINGS AT HOME, OR GET ALONG WITH OTHER PEOPLE: NOT DIFFICULT AT ALL
5. BEING SO RESTLESS THAT IT IS HARD TO SIT STILL: MORE THAN HALF THE DAYS
3. WORRYING TOO MUCH ABOUT DIFFERENT THINGS: SEVERAL DAYS
GAD7 TOTAL SCORE: 9
1. FEELING NERVOUS, ANXIOUS, OR ON EDGE: SEVERAL DAYS

## 2020-02-18 ASSESSMENT — MIFFLIN-ST. JEOR: SCORE: 1140.55

## 2020-02-18 NOTE — ASSESSMENT & PLAN NOTE
Controlled on Zoloft 100 mg orally per day.  Although she feels her symptoms are controlled on 100 mg orally per day, she wonders if she would feel better on a higher dose.  She would like to trial the 150 mg dose and if she likes that she will continue it.  If she does not like it she would let me know so that we can revert to the old dose and if at any time she is not happy she will follow-up with me.  Otherwise I plan to follow-up with her in 1 year.  She says she will touch base with me by phone in 1 month to let me know 1 way or the other which dose she decided to take.    Plan phone follow-up in 1 month, change to office visit if not feeling well.

## 2020-04-03 ENCOUNTER — COMMUNICATION - HEALTHEAST (OUTPATIENT)
Dept: FAMILY MEDICINE | Facility: CLINIC | Age: 65
End: 2020-04-03

## 2020-04-03 DIAGNOSIS — F41.9 ANXIETY: ICD-10-CM

## 2020-05-16 ENCOUNTER — COMMUNICATION - HEALTHEAST (OUTPATIENT)
Dept: FAMILY MEDICINE | Facility: CLINIC | Age: 65
End: 2020-05-16

## 2020-05-16 DIAGNOSIS — F41.9 ANXIETY: ICD-10-CM

## 2020-07-01 ENCOUNTER — COMMUNICATION - HEALTHEAST (OUTPATIENT)
Dept: FAMILY MEDICINE | Facility: CLINIC | Age: 65
End: 2020-07-01

## 2020-07-01 DIAGNOSIS — F41.9 ANXIETY: ICD-10-CM

## 2020-07-21 ENCOUNTER — COMMUNICATION - HEALTHEAST (OUTPATIENT)
Dept: FAMILY MEDICINE | Facility: CLINIC | Age: 65
End: 2020-07-21

## 2020-07-21 DIAGNOSIS — G25.81 RESTLESS LEGS SYNDROME: ICD-10-CM

## 2020-09-01 ENCOUNTER — COMMUNICATION - HEALTHEAST (OUTPATIENT)
Dept: INTERNAL MEDICINE | Facility: CLINIC | Age: 65
End: 2020-09-01

## 2020-09-01 DIAGNOSIS — M81.0 SENILE OSTEOPOROSIS: ICD-10-CM

## 2020-09-13 ENCOUNTER — COMMUNICATION - HEALTHEAST (OUTPATIENT)
Dept: FAMILY MEDICINE | Facility: CLINIC | Age: 65
End: 2020-09-13

## 2020-09-15 ENCOUNTER — OFFICE VISIT - HEALTHEAST (OUTPATIENT)
Dept: FAMILY MEDICINE | Facility: CLINIC | Age: 65
End: 2020-09-15

## 2020-09-15 DIAGNOSIS — G47.00 INSOMNIA, UNSPECIFIED TYPE: ICD-10-CM

## 2020-09-15 DIAGNOSIS — F41.9 ANXIETY: ICD-10-CM

## 2020-09-15 ASSESSMENT — ANXIETY QUESTIONNAIRES
6. BECOMING EASILY ANNOYED OR IRRITABLE: MORE THAN HALF THE DAYS
3. WORRYING TOO MUCH ABOUT DIFFERENT THINGS: SEVERAL DAYS
5. BEING SO RESTLESS THAT IT IS HARD TO SIT STILL: MORE THAN HALF THE DAYS
GAD7 TOTAL SCORE: 14
IF YOU CHECKED OFF ANY PROBLEMS ON THIS QUESTIONNAIRE, HOW DIFFICULT HAVE THESE PROBLEMS MADE IT FOR YOU TO DO YOUR WORK, TAKE CARE OF THINGS AT HOME, OR GET ALONG WITH OTHER PEOPLE: NOT DIFFICULT AT ALL
7. FEELING AFRAID AS IF SOMETHING AWFUL MIGHT HAPPEN: MORE THAN HALF THE DAYS
2. NOT BEING ABLE TO STOP OR CONTROL WORRYING: MORE THAN HALF THE DAYS
4. TROUBLE RELAXING: MORE THAN HALF THE DAYS
1. FEELING NERVOUS, ANXIOUS, OR ON EDGE: NEARLY EVERY DAY

## 2020-09-15 ASSESSMENT — PATIENT HEALTH QUESTIONNAIRE - PHQ9: SUM OF ALL RESPONSES TO PHQ QUESTIONS 1-9: 4

## 2020-09-15 NOTE — ASSESSMENT & PLAN NOTE
Discussed bedtime meditation.   Start trial of trazodone.   Could also consider melatonin and magnesium.   Follow up in 1 month.

## 2020-09-15 NOTE — ASSESSMENT & PLAN NOTE
Recheck anxiety, increased zoloft from 50 to 150mg po q day about a month ago.     Recommend dc all caffeine. She was drinking 6-7 diet cokes per day.     Depression action plan reviewed.      Offered counseling and declined.     Headspace meditation.    Follow up in 1 month

## 2020-10-07 ENCOUNTER — COMMUNICATION - HEALTHEAST (OUTPATIENT)
Dept: FAMILY MEDICINE | Facility: CLINIC | Age: 65
End: 2020-10-07

## 2020-10-07 DIAGNOSIS — G47.00 INSOMNIA, UNSPECIFIED TYPE: ICD-10-CM

## 2020-10-09 RX ORDER — TRAZODONE HYDROCHLORIDE 50 MG/1
TABLET, FILM COATED ORAL
Qty: 90 TABLET | Refills: 3 | Status: SHIPPED | OUTPATIENT
Start: 2020-10-09 | End: 2021-09-07

## 2020-10-13 ENCOUNTER — OFFICE VISIT - HEALTHEAST (OUTPATIENT)
Dept: FAMILY MEDICINE | Facility: CLINIC | Age: 65
End: 2020-10-13

## 2020-10-13 DIAGNOSIS — F51.04 PSYCHOPHYSIOLOGICAL INSOMNIA: ICD-10-CM

## 2020-10-13 DIAGNOSIS — F41.1 ANXIETY STATE: ICD-10-CM

## 2020-10-13 ASSESSMENT — ANXIETY QUESTIONNAIRES
7. FEELING AFRAID AS IF SOMETHING AWFUL MIGHT HAPPEN: SEVERAL DAYS
5. BEING SO RESTLESS THAT IT IS HARD TO SIT STILL: SEVERAL DAYS
GAD7 TOTAL SCORE: 9
6. BECOMING EASILY ANNOYED OR IRRITABLE: MORE THAN HALF THE DAYS
1. FEELING NERVOUS, ANXIOUS, OR ON EDGE: MORE THAN HALF THE DAYS
4. TROUBLE RELAXING: MORE THAN HALF THE DAYS
2. NOT BEING ABLE TO STOP OR CONTROL WORRYING: SEVERAL DAYS
IF YOU CHECKED OFF ANY PROBLEMS ON THIS QUESTIONNAIRE, HOW DIFFICULT HAVE THESE PROBLEMS MADE IT FOR YOU TO DO YOUR WORK, TAKE CARE OF THINGS AT HOME, OR GET ALONG WITH OTHER PEOPLE: SOMEWHAT DIFFICULT
3. WORRYING TOO MUCH ABOUT DIFFERENT THINGS: NOT AT ALL

## 2020-10-13 NOTE — ASSESSMENT & PLAN NOTE
Still waking up feeling anxious around 2-3 am.   Will switch zoloft to effexor.   Discussed bedtime meditation and pathophysiology of how this helps.   Continue trazodone 50 mg po q day.   Could also consider melatonin and magnesium or increase trazodone.   Follow up in 2-4 weeks.

## 2020-10-13 NOTE — ASSESSMENT & PLAN NOTE
Currently on zoloft 150mg po q day, but still feels anxious.       Recommend dc all caffeine. She was drinking 6-7 diet cokes per day.      Offered counseling and declined.      Headspace meditation.    Discontinue zoloft.   Start effexor 75mg po q day.  Increase hydroxyzine tabs from 25 mg to 50 mg tabs as she has been using that dose.     Follow up in 2-4 weeks

## 2020-10-30 ENCOUNTER — OFFICE VISIT - HEALTHEAST (OUTPATIENT)
Dept: FAMILY MEDICINE | Facility: CLINIC | Age: 65
End: 2020-10-30

## 2020-10-30 DIAGNOSIS — G47.00 INSOMNIA, UNSPECIFIED TYPE: ICD-10-CM

## 2020-10-30 DIAGNOSIS — F41.1 ANXIETY STATE: ICD-10-CM

## 2020-10-30 ASSESSMENT — ANXIETY QUESTIONNAIRES
2. NOT BEING ABLE TO STOP OR CONTROL WORRYING: MORE THAN HALF THE DAYS
7. FEELING AFRAID AS IF SOMETHING AWFUL MIGHT HAPPEN: NEARLY EVERY DAY
3. WORRYING TOO MUCH ABOUT DIFFERENT THINGS: SEVERAL DAYS
6. BECOMING EASILY ANNOYED OR IRRITABLE: NEARLY EVERY DAY
5. BEING SO RESTLESS THAT IT IS HARD TO SIT STILL: SEVERAL DAYS
4. TROUBLE RELAXING: SEVERAL DAYS
IF YOU CHECKED OFF ANY PROBLEMS ON THIS QUESTIONNAIRE, HOW DIFFICULT HAVE THESE PROBLEMS MADE IT FOR YOU TO DO YOUR WORK, TAKE CARE OF THINGS AT HOME, OR GET ALONG WITH OTHER PEOPLE: VERY DIFFICULT
GAD7 TOTAL SCORE: 13
1. FEELING NERVOUS, ANXIOUS, OR ON EDGE: MORE THAN HALF THE DAYS

## 2020-10-30 ASSESSMENT — PATIENT HEALTH QUESTIONNAIRE - PHQ9: SUM OF ALL RESPONSES TO PHQ QUESTIONS 1-9: 7

## 2020-10-30 NOTE — ASSESSMENT & PLAN NOTE
Patient feeling much better with effexor 75mg po q day and wants to try higher dose to see if that isbetter if not she would revert back to the current dose as that is doing the job, but she is wondering if more would be better.     Increase Effexor from 75mg po q day to 150mg xr daily.  Continue to avoid caffeine and use headspace meditation suly which she has liked.   Follow up in 1 month to determine which dose she likes.

## 2020-10-30 NOTE — ASSESSMENT & PLAN NOTE
Controlled with trazodone 50 mg po q day.   In addition she is now doing meditation, using magnesium, and melatonin otc and feelingmuch better.   Continue current plan follow up in 1 year.

## 2020-11-06 ENCOUNTER — COMMUNICATION - HEALTHEAST (OUTPATIENT)
Dept: FAMILY MEDICINE | Facility: CLINIC | Age: 65
End: 2020-11-06

## 2020-11-06 DIAGNOSIS — F41.1 ANXIETY STATE: ICD-10-CM

## 2020-11-09 ENCOUNTER — HOSPITAL ENCOUNTER (OUTPATIENT)
Dept: MAMMOGRAPHY | Facility: CLINIC | Age: 65
Discharge: HOME | End: 2020-11-09
Attending: OBSTETRICS & GYNECOLOGY

## 2020-11-09 DIAGNOSIS — Z12.31 VISIT FOR SCREENING MAMMOGRAM: ICD-10-CM

## 2020-11-13 ENCOUNTER — COMMUNICATION - HEALTHEAST (OUTPATIENT)
Dept: FAMILY MEDICINE | Facility: CLINIC | Age: 65
End: 2020-11-13

## 2020-11-13 DIAGNOSIS — F41.1 ANXIETY STATE: ICD-10-CM

## 2020-11-16 RX ORDER — HYDROXYZINE PAMOATE 50 MG/1
50 CAPSULE ORAL 4 TIMES DAILY PRN
Qty: 90 CAPSULE | Refills: 10 | Status: SHIPPED | OUTPATIENT
Start: 2020-11-16 | End: 2021-08-28

## 2020-12-02 ENCOUNTER — OFFICE VISIT - HEALTHEAST (OUTPATIENT)
Dept: FAMILY MEDICINE | Facility: CLINIC | Age: 65
End: 2020-12-02

## 2020-12-02 DIAGNOSIS — F41.1 ANXIETY STATE: ICD-10-CM

## 2020-12-02 DIAGNOSIS — Z23 IMMUNIZATION DUE: ICD-10-CM

## 2020-12-02 DIAGNOSIS — G47.00 INSOMNIA, UNSPECIFIED TYPE: ICD-10-CM

## 2020-12-02 ASSESSMENT — ANXIETY QUESTIONNAIRES
3. WORRYING TOO MUCH ABOUT DIFFERENT THINGS: SEVERAL DAYS
4. TROUBLE RELAXING: MORE THAN HALF THE DAYS
5. BEING SO RESTLESS THAT IT IS HARD TO SIT STILL: SEVERAL DAYS
6. BECOMING EASILY ANNOYED OR IRRITABLE: SEVERAL DAYS
GAD7 TOTAL SCORE: 11
2. NOT BEING ABLE TO STOP OR CONTROL WORRYING: MORE THAN HALF THE DAYS
7. FEELING AFRAID AS IF SOMETHING AWFUL MIGHT HAPPEN: MORE THAN HALF THE DAYS
1. FEELING NERVOUS, ANXIOUS, OR ON EDGE: MORE THAN HALF THE DAYS
IF YOU CHECKED OFF ANY PROBLEMS ON THIS QUESTIONNAIRE, HOW DIFFICULT HAVE THESE PROBLEMS MADE IT FOR YOU TO DO YOUR WORK, TAKE CARE OF THINGS AT HOME, OR GET ALONG WITH OTHER PEOPLE: SOMEWHAT DIFFICULT

## 2020-12-02 ASSESSMENT — PATIENT HEALTH QUESTIONNAIRE - PHQ9: SUM OF ALL RESPONSES TO PHQ QUESTIONS 1-9: 9

## 2020-12-02 NOTE — ASSESSMENT & PLAN NOTE
Waking up once in the night around 1 AM.  We talked about doing the nighttime SOS in the VIE[employer] smart phone suly.  We will continue 50 mg of trazodone per day as well.

## 2020-12-02 NOTE — ASSESSMENT & PLAN NOTE
Improved on venlafaxine 150mg (increased from 75mg) but still feeling anxious. Wants to try the 225 mg dose.   Will increase to 225 mg dose.  She has been doing a morning meditation and she wants to see if she can watch the news on the treadmill after morning meditation to get in some exercise.   Follow up in 1 month to make sure she feels better.

## 2020-12-08 ENCOUNTER — COMMUNICATION - HEALTHEAST (OUTPATIENT)
Dept: INTERNAL MEDICINE | Facility: CLINIC | Age: 65
End: 2020-12-08

## 2020-12-08 DIAGNOSIS — M81.0 SENILE OSTEOPOROSIS: ICD-10-CM

## 2020-12-09 ENCOUNTER — COMMUNICATION - HEALTHEAST (OUTPATIENT)
Dept: FAMILY MEDICINE | Facility: CLINIC | Age: 65
End: 2020-12-09

## 2020-12-09 DIAGNOSIS — F41.1 ANXIETY STATE: ICD-10-CM

## 2020-12-10 RX ORDER — ALENDRONATE SODIUM 70 MG/1
TABLET ORAL
Qty: 12 TABLET | Refills: 3 | Status: SHIPPED | OUTPATIENT
Start: 2020-12-10 | End: 2021-10-19

## 2020-12-11 ENCOUNTER — COMMUNICATION - HEALTHEAST (OUTPATIENT)
Dept: FAMILY MEDICINE | Facility: CLINIC | Age: 65
End: 2020-12-11

## 2021-01-05 ENCOUNTER — COMMUNICATION - HEALTHEAST (OUTPATIENT)
Dept: FAMILY MEDICINE | Facility: CLINIC | Age: 66
End: 2021-01-05

## 2021-01-05 DIAGNOSIS — F41.1 ANXIETY STATE: ICD-10-CM

## 2021-01-08 ENCOUNTER — OFFICE VISIT - HEALTHEAST (OUTPATIENT)
Dept: FAMILY MEDICINE | Facility: CLINIC | Age: 66
End: 2021-01-08

## 2021-01-08 DIAGNOSIS — F50.9 EATING DISORDER, UNSPECIFIED TYPE: ICD-10-CM

## 2021-01-08 DIAGNOSIS — F41.1 ANXIETY STATE: ICD-10-CM

## 2021-01-08 DIAGNOSIS — M20.001 FINGER DEFORMITY, RIGHT: ICD-10-CM

## 2021-01-08 DIAGNOSIS — Z51.81 ENCOUNTER FOR THERAPEUTIC DRUG MONITORING: ICD-10-CM

## 2021-01-08 LAB
ANION GAP SERPL CALCULATED.3IONS-SCNC: 10 MMOL/L (ref 5–18)
BUN SERPL-MCNC: 13 MG/DL (ref 8–22)
C REACTIVE PROTEIN LHE: 0.2 MG/DL (ref 0–0.8)
CALCIUM SERPL-MCNC: 9.3 MG/DL (ref 8.5–10.5)
CHLORIDE BLD-SCNC: 106 MMOL/L (ref 98–107)
CO2 SERPL-SCNC: 25 MMOL/L (ref 22–31)
CREAT SERPL-MCNC: 0.7 MG/DL (ref 0.6–1.1)
ERYTHROCYTE [SEDIMENTATION RATE] IN BLOOD BY WESTERGREN METHOD: 5 MM/HR (ref 0–20)
GFR SERPL CREATININE-BSD FRML MDRD: >60 ML/MIN/1.73M2
GLUCOSE BLD-MCNC: 81 MG/DL (ref 70–125)
POTASSIUM BLD-SCNC: 4.1 MMOL/L (ref 3.5–5)
RHEUMATOID FACT SERPL-ACNC: <15 IU/ML (ref 0–30)
SODIUM SERPL-SCNC: 141 MMOL/L (ref 136–145)

## 2021-01-08 ASSESSMENT — ANXIETY QUESTIONNAIRES
2. NOT BEING ABLE TO STOP OR CONTROL WORRYING: SEVERAL DAYS
3. WORRYING TOO MUCH ABOUT DIFFERENT THINGS: SEVERAL DAYS
5. BEING SO RESTLESS THAT IT IS HARD TO SIT STILL: SEVERAL DAYS
6. BECOMING EASILY ANNOYED OR IRRITABLE: SEVERAL DAYS
IF YOU CHECKED OFF ANY PROBLEMS ON THIS QUESTIONNAIRE, HOW DIFFICULT HAVE THESE PROBLEMS MADE IT FOR YOU TO DO YOUR WORK, TAKE CARE OF THINGS AT HOME, OR GET ALONG WITH OTHER PEOPLE: SOMEWHAT DIFFICULT
1. FEELING NERVOUS, ANXIOUS, OR ON EDGE: SEVERAL DAYS
GAD7 TOTAL SCORE: 9
7. FEELING AFRAID AS IF SOMETHING AWFUL MIGHT HAPPEN: MORE THAN HALF THE DAYS
4. TROUBLE RELAXING: MORE THAN HALF THE DAYS

## 2021-01-08 ASSESSMENT — PATIENT HEALTH QUESTIONNAIRE - PHQ9: SUM OF ALL RESPONSES TO PHQ QUESTIONS 1-9: 6

## 2021-01-08 NOTE — ASSESSMENT & PLAN NOTE
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 Worry about rheumatoid arthritis, she describes her mother having ulnar deformity of the hands. She likes to color and has several hobbies requiring fine motor finger dexterity

## 2021-01-08 NOTE — ASSESSMENT & PLAN NOTE
Notes stress eating in evening, wonders if it is binge eating or stress eating or compulsive eating. Offered charles program evaluation- shedeclined for now.       We did discuss that her increased appetite could be due to increased dose of effexor which can cause weight gain, but she does not want to consider changing that for now.

## 2021-01-08 NOTE — ASSESSMENT & PLAN NOTE
Likes effexor 225 mg wants to keep that dose. She has also mentioned increased night eating which she did not relate to the effexor, but I do wonder if it is related. She has gained 2 lbs since increasing the dose.  She is asked to monitor her weight and is informed that effexor can cause weight gain.   Follow up in 6 months or sooner if needed.

## 2021-01-12 LAB
ANA SER QL: 0.7 U
CCP AB SER IA-ACNC: <0.5 U/ML

## 2021-02-23 ENCOUNTER — OFFICE VISIT - HEALTHEAST (OUTPATIENT)
Dept: INTERNAL MEDICINE | Facility: CLINIC | Age: 66
End: 2021-02-23

## 2021-02-23 DIAGNOSIS — M81.0 SENILE OSTEOPOROSIS: ICD-10-CM

## 2021-04-20 ENCOUNTER — COMMUNICATION - HEALTHEAST (OUTPATIENT)
Dept: FAMILY MEDICINE | Facility: CLINIC | Age: 66
End: 2021-04-20

## 2021-04-20 DIAGNOSIS — G25.81 RESTLESS LEGS SYNDROME: ICD-10-CM

## 2021-04-20 RX ORDER — PRAMIPEXOLE DIHYDROCHLORIDE 0.25 MG/1
TABLET ORAL
Qty: 90 TABLET | Refills: 1 | Status: SHIPPED | OUTPATIENT
Start: 2021-04-20 | End: 2021-08-04

## 2021-05-05 ENCOUNTER — COMMUNICATION - HEALTHEAST (OUTPATIENT)
Dept: FAMILY MEDICINE | Facility: CLINIC | Age: 66
End: 2021-05-05

## 2021-05-18 ENCOUNTER — RECORDS - HEALTHEAST (OUTPATIENT)
Dept: ADMINISTRATIVE | Facility: OTHER | Age: 66
End: 2021-05-18

## 2021-05-18 ENCOUNTER — OFFICE VISIT - HEALTHEAST (OUTPATIENT)
Dept: FAMILY MEDICINE | Facility: CLINIC | Age: 66
End: 2021-05-18

## 2021-05-18 DIAGNOSIS — F41.1 ANXIETY STATE: ICD-10-CM

## 2021-05-18 ASSESSMENT — ANXIETY QUESTIONNAIRES
5. BEING SO RESTLESS THAT IT IS HARD TO SIT STILL: SEVERAL DAYS
2. NOT BEING ABLE TO STOP OR CONTROL WORRYING: SEVERAL DAYS
3. WORRYING TOO MUCH ABOUT DIFFERENT THINGS: SEVERAL DAYS
7. FEELING AFRAID AS IF SOMETHING AWFUL MIGHT HAPPEN: NEARLY EVERY DAY
4. TROUBLE RELAXING: MORE THAN HALF THE DAYS
IF YOU CHECKED OFF ANY PROBLEMS ON THIS QUESTIONNAIRE, HOW DIFFICULT HAVE THESE PROBLEMS MADE IT FOR YOU TO DO YOUR WORK, TAKE CARE OF THINGS AT HOME, OR GET ALONG WITH OTHER PEOPLE: SOMEWHAT DIFFICULT
1. FEELING NERVOUS, ANXIOUS, OR ON EDGE: NEARLY EVERY DAY
GAD7 TOTAL SCORE: 13
6. BECOMING EASILY ANNOYED OR IRRITABLE: MORE THAN HALF THE DAYS

## 2021-05-18 ASSESSMENT — MIFFLIN-ST. JEOR: SCORE: 1224.47

## 2021-05-18 ASSESSMENT — PATIENT HEALTH QUESTIONNAIRE - PHQ9: SUM OF ALL RESPONSES TO PHQ QUESTIONS 1-9: 5

## 2021-05-18 NOTE — ASSESSMENT & PLAN NOTE
Discontinue effexor immediately  Restart zoloft 150 immediately     Callif not feeling well.   Plan follow up in 1 month. She thinks she might need higher dose of zoloft than 150 so we can revisit this in 1 month.

## 2021-05-25 ENCOUNTER — RECORDS - HEALTHEAST (OUTPATIENT)
Dept: ADMINISTRATIVE | Facility: CLINIC | Age: 66
End: 2021-05-25

## 2021-05-27 VITALS — WEIGHT: 159.9 LBS

## 2021-05-27 VITALS — OXYGEN SATURATION: 99 % | SYSTOLIC BLOOD PRESSURE: 122 MMHG | HEART RATE: 90 BPM | DIASTOLIC BLOOD PRESSURE: 60 MMHG

## 2021-05-27 NOTE — PATIENT INSTRUCTIONS - HE
Patient Education   Pramipexole Dihydrochloride Oral tablet  What is this medicine?  PRAMIPEXOLE (pra mi PEX ole) is used to treat symptoms of Parkinson's disease. It is also used to treat Restless Legs Syndrome.  This medicine may be used for other purposes; ask your health care provider or pharmacist if you have questions.  What should I tell my health care provider before I take this medicine?  They need to know if you have any of these conditions:    dizzy or fainting spells    heart disease    kidney disease    low blood pressure    sleeping problems    an unusual or allergic reaction to pramipexole, other medicines, foods, dyes, or preservatives    pregnant or trying to get pregnant    breast-feeding  How should I use this medicine?  Take this medicine by mouth with a glass of water. Follow the directions on the prescription label. Take with food. Take your doses at regular intervals. Do not take your medicine more often than directed. Do not stop taking except on the advice of your doctor or health care professional.  Talk to your pediatrician regarding the use of this medicine in children. Special care may be needed.  Overdosage: If you think you have taken too much of this medicine contact a poison control center or emergency room at once.  NOTE: This medicine is only for you. Do not share this medicine with others.  What if I miss a dose?  If you miss a dose, take it as soon as you can. If it is almost time for your next dose, take only that dose. Do not take double or extra doses.  What may interact with this medicine?    amantadine    cimetidine    diltiazem    medicines for mental problems or psychotic disturbances    medicines for sleep    metoclopramide    quinidine or quinine    ranitidine    triamterene    verapamil  This list may not describe all possible interactions. Give your health care provider a list of all the medicines, herbs, non-prescription drugs, or dietary supplements you use. Also  tell them if you smoke, drink alcohol, or use illegal drugs. Some items may interact with your medicine.  What should I watch for while using this medicine?  Visit your doctor or health care professional for regular checks on your progress. It may be several weeks or months before you feel the full effect of this medicine. Continue to take your medicine on a regular schedule.  You may get drowsy or dizzy. Do not drive, use machinery, or do anything that needs mental alertness until you know how this drug affects you. Do not stand or sit up quickly, especially if you are an older patient. This reduces the risk of dizzy or fainting spells. If you find that you have sudden feelings of wanting to sleep during normal activities, like cooking, watching television, or while driving or riding in a car, you should contact your health care professional.  Your mouth may get dry. Chewing sugarless gum or sucking hard candy, and drinking plenty of water may help. Contact your doctor if the problem does not go away or is severe.  There have been reports of increased sexual urges or other strong urges such as gambling while taking some medicines for Parkinson's disease. If you experience any of these urges while taking this medicine, you should report it to your health care provider as soon as possible.  You should check your skin often for changes to moles and new growths while taking this medicine. Call your doctor if you notice any of these changes.  What side effects may I notice from receiving this medicine?  Side effects that you should report to your doctor or health care professional as soon as possible:    confusion    double vision or other vision problems    fainting spells    falling asleep during normal activities like driving    hallucination, loss of contact with reality    mental changes    muscle pain or severe muscle weakness    uncontrollable movements of the arms, face, hands, head, mouth, shoulders, or upper  body  Side effects that usually do not require medical attention (report to your doctor or health care professional if they continue or are bothersome):    constipation    frequent urination    mild weakness    nausea  This list may not describe all possible side effects. Call your doctor for medical advice about side effects. You may report side effects to FDA at 5-290-PSA-6174.  Where should I keep my medicine?  Keep out of the reach of children.  Store at room temperature between 15 and 30 degrees C (59 and 86 degrees F). Protect from light. Throw away any unused medicine after the expiration date.  NOTE:This sheet is a summary. It may not cover all possible information. If you have questions about this medicine, talk to your doctor, pharmacist, or health care provider. Copyright  2015 [employer] Standard         Return in about 1 month (around 5/4/2019) for follow up restless legs.

## 2021-05-27 NOTE — PROGRESS NOTES
ASSESSMENT AND PLAN:     Problem List Items Addressed This Visit        Unprioritized    Abnormal weight gain     discontinue metformin, no longer taking, only took while on prednisone.  She declined to be weighed today.         Restless legs syndrome - Primary     Will check cbc and ferritin as low iron stores and ferritin less than 50 is the only test known to correlate with restless legs per [email or web address]. Discussed risks and benefits of mirapex.  She would like to try mirapex.     She did also mention that when she is more active during the day she feels like she has less problems with the restless legs at night and so she is going to try to make sure she gets 10,000 steps every day and see if that helpful before she starts the Mirapex.    Follow-up in 1 month to see how she is doing with restless legs.  We could also consult the sleep specialist if this is an ongoing problem.         Relevant Medications    pramipexole (MIRAPEX) 0.25 MG tablet    Other Relevant Orders    HM1(CBC and Differential)    Ferritin    HM1 (CBC with Diff)           Chief Complaint   Patient presents with     Restless legs     ongoing for years, worse with stopping the gabapentin        HPI  Awilda Pandya is a 64 y.o. female  comes in to discuss problems with restless legs.  She is actually had this problem for years.  It keeps her awake at night sometimes and then she has an awful day the next day.  She said that it was significantly better on gabapentin.  However, she does not like gabapentin and does not want to go back on that and wonders if there is something else she can take.  Her  is with her and says that her restless legs also keep him awake.    Social History     Tobacco Use   Smoking Status Never Smoker   Smokeless Tobacco Never Used      Current Outpatient Medications on File Prior to Visit   Medication Sig Dispense Refill     cetirizine (ZYRTEC) 10 MG tablet Take 10 mg by mouth daily.       HYDROcodone-acetaminophen  5-325 mg per tablet Take 1 tablet by mouth every 4 (four) hours as needed for pain. 20 tablet 0     multivitamin (MULTIPLE VITAMIN ESSENTIAL) per tablet Take 1 tablet by mouth daily.       OMEGA-3/DHA/EPA/FISH OIL (FISH OIL-OMEGA-3 FATTY ACIDS) 300-1,000 mg capsule Take 1 g by mouth 2 (two) times a day.       gabapentin (NEURONTIN) 300 MG capsule Take 300 mg by mouth. 7 pills per day       [DISCONTINUED] cyclobenzaprine (FLEXERIL) 5 MG tablet Take 1 tablet (5 mg total) by mouth at bedtime as needed for muscle spasms. 30 tablet 0     [DISCONTINUED] metFORMIN (GLUCOPHAGE XR) 500 MG 24 hr tablet Take 1 tablet (500 mg total) by mouth daily with breakfast. 90 tablet 0     [DISCONTINUED] predniSONE (DELTASONE) 10 mg tablet Take 7.5 mg by mouth daily .  1     No current facility-administered medications on file prior to visit.       Allergies   Allergen Reactions     Gluten      Wheat Flour        Review of Systems   Constitutional: Negative.    HENT: Negative.    Eyes: Negative.    Respiratory: Negative.    Cardiovascular: Negative.    Gastrointestinal: Negative.    Endocrine: Negative.    Genitourinary: Negative.    Musculoskeletal: Negative.    Skin: Negative.    Neurological: Negative.    Hematological: Negative.    Psychiatric/Behavioral: Negative.         OBJECTIVE: /74 (Patient Site: Left Arm, Patient Position: Sitting, Cuff Size: Adult Regular)   Pulse 72   LMP  (LMP Unknown)    Physical Exam   Constitutional: She is oriented to person, place, and time. She appears well-developed and well-nourished. No distress.   HENT:   Head: Normocephalic and atraumatic.   Eyes: Conjunctivae are normal.   Neck: Neck supple.   Cardiovascular: Normal rate and regular rhythm.   Pulmonary/Chest: Effort normal.   Musculoskeletal: Normal range of motion.   Neurological: She is alert and oriented to person, place, and time.   Skin: Skin is warm and dry.   Psychiatric: She has a normal mood and affect.        This note was  created using Dragon dictation.  Please excuse any grammatical errors.

## 2021-05-28 ASSESSMENT — ANXIETY QUESTIONNAIRES
GAD7 TOTAL SCORE: 9
GAD7 TOTAL SCORE: 13
GAD7 TOTAL SCORE: 9
GAD7 TOTAL SCORE: 11
GAD7 TOTAL SCORE: 13
GAD7 TOTAL SCORE: 11
GAD7 TOTAL SCORE: 13
GAD7 TOTAL SCORE: 9
GAD7 TOTAL SCORE: 14

## 2021-05-28 NOTE — TELEPHONE ENCOUNTER
Refill Approved    Rx renewed per Medication Renewal Policy. Medication was last renewed on 4/4/19.    Tiffany Luz, [facility] Triage/Med Refill 4/29/2019     Requested Prescriptions   Pending Prescriptions Disp Refills     pramipexole (MIRAPEX) 0.25 MG tablet [Pharmacy Med Name: PRAMIPEXOLE 0.25 MG TABLET] 30 tablet 0     Sig: TAKE 1 TABLET (0.25 MG TOTAL) BY MOUTH AT BEDTIME.       Parkinson's Meds I Refill Protocol Passed - 4/26/2019 12:31 PM        Passed - PCP or prescribing provider visit in past 6 months      Last office visit with prescriber/PCP: 4/4/2019 OR same dept: 4/4/2019 Carol Dickinson MD OR same specialty: 4/4/2019 Carol Dickinson MD Last physical: 10/30/2018 Last MTM visit: Visit date not found     Next appt within 3 mo: Visit date not found  Next physical within 3 mo: Visit date not found  Prescriber OR PCP: Carol Dickinson MD  Last diagnosis associated with med order: 1. Restless legs syndrome  - pramipexole (MIRAPEX) 0.25 MG tablet [Pharmacy Med Name: PRAMIPEXOLE 0.25 MG TABLET]; Take 1 tablet (0.25 mg total) by mouth at bedtime.  Dispense: 30 tablet; Refill: 0    If protocol passes may refill for 6 months if within 3 months of last provider visit (or a total of 9 months).

## 2021-05-30 NOTE — PROGRESS NOTES
ASSESSMENT AND PLAN:     Problem List Items Addressed This Visit        High    BMI 25.0-25.9,adult     She has lost weight.  Weight has intentionally come down from 163 in October 2000 18-1 52 today with decreasing sugar intake and increasing activity levels.           Hepatic steatosis     She is attempting weight loss on her own.  Decreasing her sugar levels and increasing her physical activity.  She has successfully lost almost 10 pounds since October.  This should help her hepatic steatosis.  We did review her LFTs which have been normal.            Unprioritized    Dyslipidemia     Plan to recheck at annual exam 10/2019.         Restless legs syndrome     She says her restless leg syndrome is under control now.  She notes that when she walks at least 10,000 steps a day and does some walking in the evening hours it is really helpful.  Also she intermittently uses Mirapex for restless legs which is also helpful.  She would like a refill of the Mirapex.  We did check her CBC which was normal and her ferritin which was 77.  At this point I will not consult the sleep specialist since she feels things are managed but in the future we could always consider that.         Relevant Medications    pramipexole (MIRAPEX) 0.25 MG tablet    RESOLVED: Abnormal weight gain     Today we talked about the weight that she gained after taking prednisone.  Weight was noted to be 160 310/2018 and dropped from there to 152 today.  She has been trying to avoid sugar and being more active.  She will continue her efforts         RESOLVED: Chronic right-sided thoracic back pain     She did do some physical therapy and her back pain is completely resolved now.  I will resolve this problem.                Chief Complaint   Patient presents with     Follow-up     restless legs         HPI  Awilda Pandya is a 64 y.o. female comes in for follow-up of restless leg syndrome.  She also had been trying to lose weight because she was having some  back pain and hepatic steatosis and hyperlipidemia.  She is been successful in losing some weight with increased activity and decreased sugar.    Social History     Tobacco Use   Smoking Status Never Smoker   Smokeless Tobacco Never Used      Current Outpatient Medications on File Prior to Visit   Medication Sig Dispense Refill     cetirizine (ZYRTEC) 10 MG tablet Take 10 mg by mouth daily.       multivitamin (MULTIPLE VITAMIN ESSENTIAL) per tablet Take 1 tablet by mouth 2 (two) times a day.              OMEGA-3/DHA/EPA/FISH OIL (FISH OIL-OMEGA-3 FATTY ACIDS) 300-1,000 mg capsule Take 1 g by mouth 2 (two) times a day.       [DISCONTINUED] pramipexole (MIRAPEX) 0.25 MG tablet TAKE 1 TABLET (0.25 MG TOTAL) BY MOUTH AT BEDTIME. 30 tablet 6     [DISCONTINUED] HYDROcodone-acetaminophen 5-325 mg per tablet Take 1 tablet by mouth every 4 (four) hours as needed for pain. 20 tablet 0     [DISCONTINUED] pramipexole (MIRAPEX) 0.25 MG tablet Take 1 tablet (0.25 mg total) by mouth at bedtime. 90 tablet 0     No current facility-administered medications on file prior to visit.       Allergies   Allergen Reactions     Gluten      Wheat Flour        Review of Systems   Constitutional: Negative.    HENT: Negative.    Eyes: Negative.    Respiratory: Negative.    Cardiovascular: Negative.    Gastrointestinal: Negative.    Endocrine: Negative.    Genitourinary: Negative.    Musculoskeletal: Negative.    Skin: Negative.    Neurological: Negative.    Hematological: Negative.    Psychiatric/Behavioral: Negative.         OBJECTIVE: /64 (Patient Site: Left Arm, Patient Position: Sitting, Cuff Size: Adult Regular)   Pulse 74   Temp 98  F (36.7  C) (Oral)   Resp 20   Wt 152 lb (68.9 kg)   LMP  (LMP Unknown)   SpO2 98% Comment: room air  Breastfeeding? No   BMI 25.29 kg/m     Physical Exam   Constitutional: She is oriented to person, place, and time. She appears well-developed and well-nourished. No distress.   HENT:   Head:  Normocephalic and atraumatic.   Eyes: Conjunctivae are normal.   Neck: Neck supple.   Cardiovascular: Normal rate and regular rhythm.   Pulmonary/Chest: Effort normal.   Musculoskeletal: Normal range of motion.   Neurological: She is alert and oriented to person, place, and time.   Skin: Skin is warm and dry.   Psychiatric: She has a normal mood and affect.        This note was created using Dragon dictation.  Please excuse any grammatical errors.

## 2021-05-31 VITALS — HEIGHT: 65 IN | WEIGHT: 161 LBS | BODY MASS INDEX: 26.82 KG/M2

## 2021-06-02 VITALS — HEIGHT: 65 IN | WEIGHT: 163 LBS | BODY MASS INDEX: 27.16 KG/M2

## 2021-06-02 NOTE — PROGRESS NOTES
Dear Dr. Dickinson ,  Your patient, Awilda Pandya was seen today for management of osteoporosis.  The last bone density scan was done on 9/12/19:  1. The spine bone density L1-L2 with T-score -2.5.  2. Femoral bone densities show left femoral neck T- score -1.8 and right femoral neck T-score -1.0.  3. Trabecular bone score indicates moderate trabecular bone architecture.     Patient was never treated in the past with medications for low bone density.    Social history:  reports that she has never smoked. She has never used smokeless tobacco. She reports current alcohol use. She reports that she does not use drugs.    Past medical history:   Patient Active Problem List   Diagnosis     H/O colonoscopy     BMI 25.0-25.9,adult     S/P hysterectomy due to prolapse (cervix removed)     Dyslipidemia     Left wrist pain     Allergic rhinitis, unspecified seasonality, unspecified trigger     Hepatic steatosis     Restless legs syndrome     Senile osteoporosis     Fracture of left wrist with routine healing     History of fractures: left wrist fracture in 2018, right fibula fracture 10 years ago     FH: osteoporosis - mother, M-GM, M-Aunts. Her mother had 5 compression vertebral fractures and pelvic fracture.   Kidney stones and lupus - daughter and GD.  Family History   Problem Relation Age of Onset     Breast cancer Cousin 60       Diet and supplements: only MVI daily.    Risk medications: she was on daily prednisone for 8 months during this year for RSD and chronic pain in the left wrist after the fracture.    Gynecologic history: menopause age 59, hysterectomy age 61, no HRT.    Laboratory testing:   Component      Latest Ref Rng & Units 4/4/2019   WBC      4.0 - 11.0 thou/uL 7.1   RBC      3.80 - 5.40 mill/uL 4.85   Hemoglobin      12.0 - 16.0 g/dL 14.4   Hematocrit      35.0 - 47.0 % 43.7   MCV      80 - 100 fL 90   MCH      27.0 - 34.0 pg 29.7   MCHC      32.0 - 36.0 g/dL 33.0   RDW      11.0 - 14.5 % 11.6   Platelets       140 - 440 thou/uL 294   MPV      7.0 - 10.0 fL 7.2   Neutrophils %      50 - 70 % 55   Lymphocytes %      20 - 40 % 33   Monocytes %      2 - 10 % 8   Eosinophils %      0 - 6 % 4   Basophils %      0 - 2 % 1   Neutrophils Absolute      2.0 - 7.7 thou/uL 3.9   Lymphocytes Absolute      0.8 - 4.4 thou/uL 2.4   Monocytes Absolute      0.0 - 0.9 thou/uL 0.6   Eosinophils Absolute      0.0 - 0.4 thou/uL 0.3   Basophils Absolute      0.0 - 0.2 thou/uL 0.1       ROS:     Constitutional: Negative.    HENT: Negative.    Eyes: Negative.    Respiratory: Negative.    Cardiovascular: Negative.    Gastrointestinal: Negative.    Endocrine: Negative.    Genitourinary: Negative.    Musculoskeletal: Negative.    Skin: Negative.    Allergic/Immunologic: Negative.    Neurological: Negative.    Hematological: Negative.    Psychiatric/Behavioral: Negative.      PE:  /78   Pulse 67   Wt 150 lb 6.4 oz (68.2 kg)   LMP  (LMP Unknown)   SpO2 98%   BMI 25.03 kg/m    Constitutional:  oriented to person, place, and time, appears well-nourished. No distress.   HENT:   Head: Normocephalic.   Mouth/Throat: Oropharynx is clear and moist.   Eyes: Conjunctivae are normal. Pupils are equal, round, and reactive to light.   Neck: Normal range of motion. Neck supple.   Cardiovascular: Normal rate, regular rhythm and normal heart sounds.    Pulmonary/Chest: Effort normal and breath sounds normal.  Abdominal: Soft. Bowel sounds are normal.   Musculoskeletal: Normal range of motion.   Neurological: alert and oriented to person, place, and time.   Skin: Skin is warm.   Psychiatric: normal mood and affect.         Impression:   1. Senile osteoporosis  Thyroid Stimulating Hormone (TSH)    Vitamin D, Total (25-Hydroxy)    Comprehensive Metabolic Panel    Electrophoresis, Protein, Random Urine Ccade    Parathyroid Hormone Intact with Minerals    Celiac(Gluten)Antibody Panel    Bone Specific Alkaline Phosphatase    Calcium, 24 Hour Urine     Electrophoresis, Protein, Serum, Cascade    alendronate (FOSAMAX) 70 MG tablet    Magnesium   2. Closed fracture of left wrist with routine healing, subsequent encounter  Thyroid Stimulating Hormone (TSH)    Vitamin D, Total (25-Hydroxy)    Comprehensive Metabolic Panel    Electrophoresis, Protein, Random Urine Ccade    Parathyroid Hormone Intact with Minerals    Celiac(Gluten)Antibody Panel    Bone Specific Alkaline Phosphatase    Calcium, 24 Hour Urine    Electrophoresis, Protein, Serum, Cascade       Plan:  1. The patient will take 1200 - 1500 mg of calcium daily from the diet and supplements.  2. The patient will take 2000 IU of vit D daily.  3. Treatment options discussed with the patient. We will start Fosamax weekly. Recheck DXA in 1 year. I will do workup for secondary osteoporosis.  4. I am asking for follow up in 1year .      Thank you for the opportunity to participate in the care of your patient. If you have any additional questions, do not hesitate to contact me at [facility].    This note has been dictated using voice recognition software. Any grammatical or context distortions are unintentional and inherent to the software      With best personal regards,   Mauro Mckeon MD, CCD  10/23/2019

## 2021-06-03 VITALS
SYSTOLIC BLOOD PRESSURE: 138 MMHG | DIASTOLIC BLOOD PRESSURE: 84 MMHG | WEIGHT: 149.8 LBS | HEART RATE: 82 BPM | OXYGEN SATURATION: 97 % | HEIGHT: 65 IN | BODY MASS INDEX: 24.96 KG/M2

## 2021-06-03 VITALS
OXYGEN SATURATION: 98 % | SYSTOLIC BLOOD PRESSURE: 124 MMHG | DIASTOLIC BLOOD PRESSURE: 78 MMHG | HEART RATE: 67 BPM | WEIGHT: 150.4 LBS | BODY MASS INDEX: 25.03 KG/M2

## 2021-06-03 VITALS — WEIGHT: 152 LBS | BODY MASS INDEX: 25.29 KG/M2

## 2021-06-03 NOTE — PATIENT INSTRUCTIONS - HE
Heat followed by stretching exercises twice daily.  Continue over the counter medications as needed.

## 2021-06-03 NOTE — PROGRESS NOTES
Assessment/Plan:      Problem List Items Addressed This Visit     Senile osteoporosis      Other Visit Diagnoses     Thoracic myofascial strain, initial encounter    -  Primary    Strain of lumbar region, initial encounter          Muscular strain suspected. Continue conservative treatment. Handouts given on exercises.    Patient Instructions   Heat followed by stretching exercises twice daily.  Continue over the counter medications as needed.      Return in about 10 days (around 11/18/2019) for recheck if not improving..    Subjective:   Awilda Pandya is a 64 y.o. female who presents today with low back pain since Sunday. She reports that she had been watching her grandson so she thought it was related to lifting him. No other injury. Pain was improving but has been worse again for the past day or so. She reports some improvement with over the counter medications. No bowel or bladder incontinence. No weakness or numbness in the legs. She does have a history of osteoporosis and so was worried about possible compression fracture.    Patient Active Problem List   Diagnosis     H/O colonoscopy     BMI 25.0-25.9,adult     S/P hysterectomy due to prolapse (cervix removed)     Dyslipidemia     Left wrist pain     Allergic rhinitis, unspecified seasonality, unspecified trigger     Hepatic steatosis     Restless legs syndrome     Senile osteoporosis     Fracture of left wrist with routine healing      Past Medical History:   Diagnosis Date     Abnormal weight gain 10/30/2018     Basal cell carcinoma      Chronic right-sided thoracic back pain 11/27/2018     Uterovaginal prolapse      Past Surgical History:   Procedure Laterality Date     COLPORRHAPHY N/A 1/19/2016    Procedure: UTEROSACRAL LIGAMENT SUSPENSION, CYSTOCELE, CYSTOSCOPY;  Surgeon: Susu Baker MD;  Location: [facility];  Service:      HYSTERECTOMY  01/2016     MOUTH SURGERY       AR VAGINAL HYSTERECTOMY,UTERUS 250 GMS/< N/A 1/19/2016    Procedure:  "VAGINAL HYSTERECTOMY;  Surgeon: Susu Baker MD;  Location: [facility];  Service: Gynecology     TUBAL LIGATION         Review of Systems      Objective:     Vitals:    11/08/19 1406 11/08/19 1409   BP: 130/90 138/84   Pulse: 82    SpO2: 97%    Weight: 149 lb 12.8 oz (67.9 kg)    Height: 5' 4.75\" (1.645 m)        Physical Exam  Constitutional:       General: She is not in acute distress.  Cardiovascular:      Rate and Rhythm: Normal rate and regular rhythm.   Pulmonary:      Breath sounds: Normal breath sounds.   Neurological:      Motor: Motor function is intact. No weakness.      Gait: Gait normal.      Deep Tendon Reflexes:      Reflex Scores:       Patellar reflexes are 2+ on the right side and 2+ on the left side.     Comments: Straight leg raise negative bilaterally. Strength normal throughout lower extremities.        Xr Thoracic Spine 2 Vws    Result Date: 11/8/2019  EXAM: XR THORACIC SPINE 2 VWS LOCATION: [facility] [place] DATE/TIME: 11/8/2019 2:57 PM INDICATION: Pain in thoracic spine. COMPARISON: None. Findings: There are small osteophytes and mild degenerative endplate changes in the lower thoracic spine. There is mild S-shaped scoliotic curvature. No fracture. No subluxation. There is a small amount of high attenuation material in the stomach.     Mild disc degeneration. No fracture or subluxation.     Xr Lumbar Spine 2 Or 3 Vws    Result Date: 11/8/2019  EXAM: XR LUMBAR SPINE 2 OR 3 VWS LOCATION: [facility] [place] DATE/TIME: 11/8/2019 2:56 PM INDICATION: Low back pain. COMPARISON: None. FINDINGS: There is mild leftward curvature of the lumbar spine. There is no fracture and no subluxation. There are Schmorl's node formation along the superior endplate of L3. There is mild loss of disc height, small osteophyte formation and degenerative endplate change at every lumbar level. There are moderate facet degenerative changes in the lower lumbar spine.     Degenerative changes. No fracture.       "

## 2021-06-04 VITALS
BODY MASS INDEX: 24.64 KG/M2 | DIASTOLIC BLOOD PRESSURE: 78 MMHG | HEIGHT: 65 IN | HEART RATE: 76 BPM | WEIGHT: 147.9 LBS | SYSTOLIC BLOOD PRESSURE: 138 MMHG

## 2021-06-04 NOTE — TELEPHONE ENCOUNTER
Refill Approved    Rx renewed per Medication Renewal Policy. Medication was last renewed on 11/26/19.    Tiffany Luz, [facility] Triage/Med Refill 12/20/2019     Requested Prescriptions   Pending Prescriptions Disp Refills     sertraline (ZOLOFT) 50 MG tablet [Pharmacy Med Name: SERTRALINE HCL 50 MG TABLET] 30 tablet 0     Sig: TAKE 1 TABLET BY MOUTH EVERY DAY       [facility] Refill Protocol  Passed - 12/18/2019  2:06 AM        Passed - PCP or prescribing provider visit in last year     Last office visit with prescriber/PCP: 6/25/2019 Carol Dickinson MD OR same dept: 11/8/2019 Flori Reveles MD OR same specialty: 11/8/2019 Flori Reveles MD  Last physical: 11/26/2019 Last MTM visit: Visit date not found   Next visit within 3 mo: Visit date not found  Next physical within 3 mo: Visit date not found  Prescriber OR PCP: Carol Dickinson MD  Last diagnosis associated with med order: 1. Anxiety  - sertraline (ZOLOFT) 50 MG tablet [Pharmacy Med Name: SERTRALINE HCL 50 MG TABLET]; TAKE 1 TABLET BY MOUTH EVERY DAY  Dispense: 30 tablet; Refill: 0    If protocol passes may refill for 12 months if within 3 months of last provider visit (or a total of 15 months).

## 2021-06-05 NOTE — PROGRESS NOTES
ASSESSMENT AND PLAN:      Problem List Items Addressed This Visit        Unprioritized    Dyslipidemia     Improved   Lab Results   Component Value Date    CHOL 203 (H) 11/26/2019    CHOL 249 (H) 10/30/2018    CHOL 204 (H) 06/20/2017     Lab Results   Component Value Date    HDL 62 11/26/2019    HDL 79 10/30/2018    HDL 58 06/20/2017     Lab Results   Component Value Date    LDLCALC 127 11/26/2019    LDLCALC 157 (H) 10/30/2018    LDLCALC 128 06/20/2017     Lab Results   Component Value Date    TRIG 70 11/26/2019    TRIG 64 10/30/2018    TRIG 92 06/20/2017     No components found for: CHOLHDL          Anxiety - Primary     Needs improvement, asks about PRN medication options for anxiety  Currently taking Zoloft 50 mg orally per day and feels that it is helpful but not enough.  I would like to avoid benzodiazepines if possible due to their addictive properties and because she is concurrently taking tramadol.  I have recommended as needed Vistaril  up to 3 times daily.  Plan follow-up in 1 month.  We may need to increase her Zoloft to 150 mg at that time.             Relevant Medications    sertraline (ZOLOFT) 100 MG tablet    hydrOXYzine pamoate (VISTARIL) 25 MG capsule           Chief Complaint   Patient presents with     Medication Management        HPI  Awilda Pandya is a 64 y.o. female comes in feeling anxious.  She did start the Zoloft that I gave her when she was last here in November.  She does feel it is been helpful but not enough.  She says she has had additional stress since she came in before.  Her  has been dealing with psychiatric stress and PTSD but then on December 28 he fell and got at head bleed due to icy conditions.  She ran out to help him and ended up falling herself and hurting her knee.  She did try some of her mom's old Xanax and wonders about using a as needed medication like Xanax.    Social History     Tobacco Use   Smoking Status Never Smoker   Smokeless Tobacco Never Used     "  Current Outpatient Medications on File Prior to Visit   Medication Sig Dispense Refill     alendronate (FOSAMAX) 70 MG tablet Take 1 tablet (70 mg total) by mouth every 7 days. Take in the morning on an empty stomach with a full glass of water 30 minutes before food 12 tablet 3     cetirizine (ZYRTEC) 10 MG tablet Take 10 mg by mouth daily.       estradiol (ESTRACE) 0.01 % (0.1 mg/gram) vaginal cream INSERT 2 GRAM BY VAGINAL ROUTE TWICE WEEKLY  0     meloxicam (MOBIC) 15 MG tablet        multivitamin (MULTIPLE VITAMIN ESSENTIAL) per tablet Take 1 tablet by mouth 2 (two) times a day.              OMEGA-3/DHA/EPA/FISH OIL (FISH OIL-OMEGA-3 FATTY ACIDS) 300-1,000 mg capsule Take 1 g by mouth 2 (two) times a day.       pramipexole (MIRAPEX) 0.25 MG tablet Take 1 tablet (0.25 mg total) by mouth at bedtime. 30 tablet 6     traMADol (ULTRAM) 50 mg tablet        No current facility-administered medications on file prior to visit.       Allergies   Allergen Reactions     Gluten      Wheat Flour        Review of Systems   Constitutional: Negative.    HENT: Negative.    Eyes: Negative.    Respiratory: Negative.    Cardiovascular: Negative.    Gastrointestinal: Negative.    Endocrine: Negative.    Genitourinary: Negative.    Musculoskeletal: Negative.    Skin: Negative.    Neurological: Negative.    Hematological: Negative.    Psychiatric/Behavioral: Negative.         OBJECTIVE: /60   Pulse 92   Resp 20   Ht 5' 4.75\" (1.645 m)   Wt 143 lb (64.9 kg)   LMP  (LMP Unknown)   BMI 23.98 kg/m     Physical Exam  Constitutional:       General: She is not in acute distress.     Appearance: She is well-developed.   HENT:      Head: Normocephalic and atraumatic.   Eyes:      Conjunctiva/sclera: Conjunctivae normal.   Neck:      Musculoskeletal: Neck supple.   Cardiovascular:      Rate and Rhythm: Normal rate and regular rhythm.   Pulmonary:      Effort: Pulmonary effort is normal.   Musculoskeletal: Normal range of motion. "   Skin:     General: Skin is warm and dry.   Neurological:      Mental Status: She is alert and oriented to person, place, and time.   Psychiatric:         Attention and Perception: Attention and perception normal.         Mood and Affect: Mood and affect normal.         Speech: Speech normal.         Behavior: Behavior normal. Behavior is cooperative.         Thought Content: Thought content normal.         Cognition and Memory: Cognition normal.         Judgment: Judgment normal.      Comments: Although  she reports being anxious, she does not seem like it.        Little interest or pleasure in doing things: Not at all  Feeling down, depressed, or hopeless: Not at all  Trouble falling or staying asleep, or sleeping too much: More than half the days  Feeling tired or having little energy: Several days  Poor appetite or overeating: Several days  Feeling bad about yourself - or that you are a failure or have let yourself or your family down: Not at all  Trouble concentrating on things, such as reading the newspaper or watching television: Not at all  Moving or speaking so slowly that other people could have noticed. Or the opposite - being so fidgety or restless that you have been moving around a lot more than usual: More than half the days  Thoughts that you would be better off dead, or of hurting yourself in some way: Not at all  PHQ-9 Total Score: 6  If you checked off any problems, how difficult have these problems made it for you to do your work, take care of things at home, or get along with other people?: Somewhat difficult     Additional History from Old Records Summarized (2): no  Decision to Obtain Records (1): no  Radiology Tests Summarized or Ordered (1): no  Labs Reviewed or Ordered (1): yes  Medicine Test Summarized or Ordered (1): yes  Independent Review of EKG or X-RAY(2 each): no    This note was created using Dragon dictation.  Please excuse any grammatical errors.

## 2021-06-05 NOTE — TELEPHONE ENCOUNTER
RN cannot approve Refill Request    RN can NOT refill this medication PCP to review. Last office visit: 1/15/2020 Carol Dickinson MD Last Physical: 11/26/2019 Last MTM visit: Visit date not found Last visit same specialty: 1/15/2020 Carol Dickinson MD.  Next visit within 3 mo: Visit date not found  Next physical within 3 mo: Visit date not found      Kristina Bellamy, [facility] Triage/Med Refill 2/8/2020    Requested Prescriptions   Pending Prescriptions Disp Refills     sertraline (ZOLOFT) 100 MG tablet [Pharmacy Med Name: SERTRALINE  MG TABLET] 30 tablet 0     Sig: TAKE 1 TABLET BY MOUTH EVERY DAY       [facility] Refill Protocol  Passed - 2/7/2020 10:31 AM        Passed - PCP or prescribing provider visit in last year     Last office visit with prescriber/PCP: 1/15/2020 Carol Dickinson MD OR same dept: 1/15/2020 Carol Dickinson MD OR same specialty: 1/15/2020 Carol Dickinson MD  Last physical: 11/26/2019 Last MTM visit: Visit date not found   Next visit within 3 mo: Visit date not found  Next physical within 3 mo: Visit date not found  Prescriber OR PCP: Carol Dickinson MD  Last diagnosis associated with med order: 1. Anxiety  - sertraline (ZOLOFT) 100 MG tablet [Pharmacy Med Name: SERTRALINE  MG TABLET]; TAKE 1 TABLET BY MOUTH EVERY DAY  Dispense: 30 tablet; Refill: 0    If protocol passes may refill for 12 months if within 3 months of last provider visit (or a total of 15 months).

## 2021-06-06 NOTE — PROGRESS NOTES
ASSESSMENT AND PLAN:     Problem List Items Addressed This Visit        Unprioritized    Anxiety - Primary     Controlled on Zoloft 100 mg orally per day.  Although she feels her symptoms are controlled on 100 mg orally per day, she wonders if she would feel better on a higher dose.  She would like to trial the 150 mg dose and if she likes that she will continue it.  If she does not like it she would let me know so that we can revert to the old dose and if at any time she is not happy she will follow-up with me.  Otherwise I plan to follow-up with her in 1 year.  She says she will touch base with me by phone in 1 month to let me know 1 way or the other which dose she decided to take.    Plan phone follow-up in 1 month, change to office visit if not feeling well.         Relevant Medications    sertraline (ZOLOFT) 100 MG tablet    sertraline (ZOLOFT) 50 MG tablet           Chief Complaint   Patient presents with     Med check        HPI  Awilda Pandya is a 64 y.o. female comes in today for a follow-up of her anxiety.  She says that the anxiety is doing pretty good on the Zoloft 100 mg orally per day.  She says her  is actually doing better because he started taking venlafaxine which is really helped a lot.  Her  is now seeing a psychiatrist.  She feels like it is been a little improved compared to the past because her 's head bleed is better.  He still fatigued and has some memory issues and continues to struggle with PTSD.  But since he is gone to the psychiatrist he is doing better on the venlafaxine.    She says she is only use the Vistaril twice saying that the Zoloft is really pretty helpful.    Social History     Tobacco Use   Smoking Status Never Smoker   Smokeless Tobacco Never Used      Current Outpatient Medications on File Prior to Visit   Medication Sig Dispense Refill     alendronate (FOSAMAX) 70 MG tablet Take 1 tablet (70 mg total) by mouth every 7 days. Take in the morning on an  "empty stomach with a full glass of water 30 minutes before food 12 tablet 3     cetirizine (ZYRTEC) 10 MG tablet Take 10 mg by mouth daily.       estradiol (ESTRACE) 0.01 % (0.1 mg/gram) vaginal cream INSERT 2 GRAM BY VAGINAL ROUTE TWICE WEEKLY  0     hydrOXYzine pamoate (VISTARIL) 25 MG capsule Take 1-4 capsules ( mg total) by mouth 3 (three) times a day as needed for itching. 100 capsule 0     multivitamin (MULTIPLE VITAMIN ESSENTIAL) per tablet Take 1 tablet by mouth 2 (two) times a day.              OMEGA-3/DHA/EPA/FISH OIL (FISH OIL-OMEGA-3 FATTY ACIDS) 300-1,000 mg capsule Take 1 g by mouth 2 (two) times a day.       pramipexole (MIRAPEX) 0.25 MG tablet Take 1 tablet (0.25 mg total) by mouth at bedtime. 30 tablet 6     traMADol (ULTRAM) 50 mg tablet        [DISCONTINUED] sertraline (ZOLOFT) 100 MG tablet TAKE 1 TABLET BY MOUTH EVERY DAY 30 tablet 0     [DISCONTINUED] meloxicam (MOBIC) 15 MG tablet        No current facility-administered medications on file prior to visit.       Allergies   Allergen Reactions     Gluten      Wheat Flour        Review of Systems   Constitutional: Negative.    HENT: Negative.    Eyes: Negative.    Respiratory: Negative.    Cardiovascular: Negative.    Gastrointestinal: Negative.    Endocrine: Negative.    Genitourinary: Negative.    Musculoskeletal: Negative.    Skin: Negative.    Neurological: Negative.    Hematological: Negative.    Psychiatric/Behavioral: Negative.         OBJECTIVE: /72   Pulse 72   Ht 5' 2\" (1.575 m)   Wt 140 lb 8 oz (63.7 kg)   LMP  (LMP Unknown)   BMI 25.70 kg/m     Physical Exam  Constitutional:       General: She is not in acute distress.     Appearance: She is well-developed.   HENT:      Head: Normocephalic and atraumatic.   Eyes:      Conjunctiva/sclera: Conjunctivae normal.   Neck:      Musculoskeletal: Neck supple.   Cardiovascular:      Rate and Rhythm: Normal rate and regular rhythm.   Pulmonary:      Effort: Pulmonary effort is " normal.   Musculoskeletal: Normal range of motion.   Skin:     General: Skin is warm and dry.   Neurological:      Mental Status: She is alert and oriented to person, place, and time.          Additional History from Old Records Summarized (2): yes  Decision to Obtain Records (1): no  Radiology Tests Summarized or Ordered (1): no  Labs Reviewed or Ordered (1): no  Medicine Test Summarized or Ordered (1): yes  Independent Review of EKG or X-RAY(2 each): no    This note was created using Dragon dictation.  Please excuse any grammatical errors.

## 2021-06-08 NOTE — TELEPHONE ENCOUNTER
Refill Approved    Rx renewed per Medication Renewal Policy. Medication was last renewed on 2/18/20.    Tiffany Luz, [facility] Triage/Med Refill 5/19/2020     Requested Prescriptions   Pending Prescriptions Disp Refills     sertraline (ZOLOFT) 50 MG tablet [Pharmacy Med Name: SERTRALINE HCL 50 MG TABLET] 30 tablet 0     Sig: TAKE 1 TABLET BY MOUTH EVERY DAY       [facility] Refill Protocol  Passed - 5/16/2020  8:36 AM        Passed - PCP or prescribing provider visit in last year     Last office visit with prescriber/PCP: 2/18/2020 Carol Dickinson MD OR same dept: 2/18/2020 Carol Dickinson MD OR same specialty: 2/18/2020 Carol Dickinson MD  Last physical: 11/26/2019 Last MTM visit: Visit date not found   Next visit within 3 mo: Visit date not found  Next physical within 3 mo: Visit date not found  Prescriber OR PCP: Carol Dickinson MD  Last diagnosis associated with med order: 1. Anxiety  - sertraline (ZOLOFT) 50 MG tablet [Pharmacy Med Name: SERTRALINE HCL 50 MG TABLET]; TAKE 1 TABLET BY MOUTH EVERY DAY  Dispense: 30 tablet; Refill: 0    If protocol passes may refill for 12 months if within 3 months of last provider visit (or a total of 15 months).

## 2021-06-09 NOTE — TELEPHONE ENCOUNTER
Refill Approved    Rx renewed per Medication Renewal Policy. Medication was last renewed on 11/26/2019.    Aidee Arteaga, [facility] Triage/Med Refill 7/21/2020     Requested Prescriptions   Pending Prescriptions Disp Refills     pramipexole (MIRAPEX) 0.25 MG tablet [Pharmacy Med Name: PRAMIPEXOLE 0.25 MG TABLET] 90 tablet 2     Sig: TAKE 1 TABLET BY MOUTH EVERYDAY AT BEDTIME       Parkinson's Meds I Refill Protocol Passed - 7/21/2020 12:15 AM        Passed - PCP or prescribing provider visit in past 6 months      Last office visit with prescriber/PCP: 2/18/2020 OR same dept: 2/18/2020 Carol Dickinson MD OR same specialty: 2/18/2020 Carol Dickinson MD Last physical: Visit date not found Last MTM visit: Visit date not found     Next appt within 3 mo: Visit date not found  Next physical within 3 mo: Visit date not found  Prescriber OR PCP: Carol Dickinson MD  Last diagnosis associated with med order: 1. Restless legs syndrome  - pramipexole (MIRAPEX) 0.25 MG tablet [Pharmacy Med Name: PRAMIPEXOLE 0.25 MG TABLET]; TAKE 1 TABLET BY MOUTH EVERYDAY AT BEDTIME  Dispense: 90 tablet; Refill: 2    If protocol passes may refill for 6 months if within 3 months of last provider visit (or a total of 9 months).             Pramipexole/Ropinirole Refill Protocol Passed - 7/21/2020 12:15 AM        Passed - PCP or prescribing provider visit in past 6 months      Last office visit with prescriber/PCP: 2/18/2020 OR same dept: 2/18/2020 Carol Dickinson MD OR same specialty: 2/18/2020 Carol Dickinson MD Last physical: Visit date not found Last MTM visit: Visit date not found         Next appt within 3 mo: Visit date not found  Next physical within 3 mo: Visit date not found  Prescriber OR PCP: Carol Dickinson MD  Last diagnosis associated with med order: 1. Restless legs syndrome  - pramipexole (MIRAPEX) 0.25 MG tablet [Pharmacy Med Name: PRAMIPEXOLE 0.25 MG TABLET]; TAKE 1 TABLET BY MOUTH EVERYDAY AT  BEDTIME  Dispense: 90 tablet; Refill: 2     If protocol passes may refill for 6 months if within 3 months of last provider visit (or a total of 9 months).

## 2021-06-09 NOTE — TELEPHONE ENCOUNTER
Refill Approved    Rx renewed per Medication Renewal Policy. Medication was last renewed on 1/15/2020.    Aidee Arteaga, [facility] Triage/Med Refill 7/2/2020     Requested Prescriptions   Pending Prescriptions Disp Refills     hydrOXYzine pamoate (VISTARIL) 25 MG capsule [Pharmacy Med Name: HYDROXYZINE HEATHER 25 MG CAP] 100 capsule 0     Sig: TAKE 1-4 CAPSULES ( MG TOTAL) BY MOUTH 3 (THREE) TIMES A DAY AS NEEDED FOR ITCHING.       Antihistamine Refill Protocol Passed - 7/1/2020  7:53 PM        Passed - Patient has had office visit/physical in last year     Last office visit with prescriber/PCP: 2/18/2020 Carol Dickinson MD OR same dept: 2/18/2020 Carol Dickinson MD OR same specialty: 2/18/2020 Carol Dickinson MD  Last physical: 11/26/2019 Last MTM visit: Visit date not found   Next visit within 3 mo: Visit date not found  Next physical within 3 mo: Visit date not found  Prescriber OR PCP: Carol Dickinson MD  Last diagnosis associated with med order: 1. Anxiety  - hydrOXYzine pamoate (VISTARIL) 25 MG capsule [Pharmacy Med Name: HYDROXYZINE HEATHER 25 MG CAP]; Take 1-4 capsules ( mg total) by mouth 3 (three) times a day as needed for itching.  Dispense: 100 capsule; Refill: 0    If protocol passes may refill for 12 months if within 3 months of last provider visit (or a total of 15 months).

## 2021-06-11 NOTE — TELEPHONE ENCOUNTER
Refill Approved    Rx renewed per Medication Renewal Policy. Medication was last renewed on 10/23/19.    Tiffany Luz, [facility] Triage/Med Refill 9/3/2020     Requested Prescriptions   Pending Prescriptions Disp Refills     alendronate (FOSAMAX) 70 MG tablet [Pharmacy Med Name: ALENDRONATE SODIUM 70 MG TAB] 12 tablet 3     Sig: TAKE 1 TABLET BY MOUTH EVERY 7 DAYS IN THE AM WITH FULL GLASS OF WATER ON EMPTY STOMACH       Biphosphonates Refill Protocol Passed - 9/1/2020  7:32 AM        Passed - PCP or prescribing provider visit in last 12 months     Last office visit with prescriber/PCP: 10/23/2019 Mauro Mckeon MD OR same dept: 10/23/2019 Mauro Mckeon MD OR same specialty: 10/23/2019 Mauro Mckeon MD  Last physical: Visit date not found Last MTM visit: Visit date not found   Next visit within 3 mo: Visit date not found  Next physical within 3 mo: Visit date not found  Prescriber OR PCP: Mauro Mckeon MD  Last diagnosis associated with med order: 1. Senile osteoporosis  - alendronate (FOSAMAX) 70 MG tablet [Pharmacy Med Name: ALENDRONATE SODIUM 70 MG TAB]; TAKE 1 TABLET BY MOUTH EVERY 7 DAYS IN THE AM WITH FULL GLASS OF WATER ON EMPTY STOMACH  Dispense: 12 tablet; Refill: 3    If protocol passes may refill for 12 months if within 3 months of last provider visit (or a total of 15 months).             Passed - Serum creatinine in last 12 months     Creatinine   Date Value Ref Range Status   11/26/2019 0.72 0.60 - 1.10 mg/dL Final

## 2021-06-11 NOTE — PROGRESS NOTES
ASSESSMENT AND PLAN:     Problem List Items Addressed This Visit        Unprioritized    Anxiety state     Recheck anxiety, increased zoloft from 50 to 150mg po q day about a month ago.     Recommend dc all caffeine. She was drinking 6-7 diet cokes per day.     Depression action plan reviewed.      Offered counseling and declined.     Headspace meditation.    Follow up in 1 month         Relevant Medications    hydrOXYzine pamoate (VISTARIL) 50 MG capsule    Insomnia - Primary     Discussed bedtime meditation.   Start trial of trazodone.   Could also consider melatonin and magnesium.   Follow up in 1 month.          Relevant Medications    traZODone (DESYREL) 50 MG tablet           Chief Complaint   Patient presents with     med check     zoloft        HPI  Awilda Pandya is a 65 y.o. female  says she is under a lot of stress. Her granddaughter has ewings sarcoma, and her mother and father are ailing and she is caring for them and it has just been too much. She feels anxious and stressed.     She says she has been taking tylenol pm or aleve pm and some vistaril.  She also got a sleep number bed and both of these things help but not enough.     Social History     Tobacco Use   Smoking Status Never Smoker   Smokeless Tobacco Never Used      Current Outpatient Medications on File Prior to Visit   Medication Sig Dispense Refill     alendronate (FOSAMAX) 70 MG tablet TAKE 1 TABLET BY MOUTH EVERY 7 DAYS IN THE AM WITH FULL GLASS OF WATER ON EMPTY STOMACH 12 tablet 0     cetirizine (ZYRTEC) 10 MG tablet Take 10 mg by mouth daily.       estradiol (ESTRACE) 0.01 % (0.1 mg/gram) vaginal cream INSERT 2 GRAM BY VAGINAL ROUTE TWICE WEEKLY  0     multivitamin (MULTIPLE VITAMIN ESSENTIAL) per tablet Take 1 tablet by mouth 2 (two) times a day.              pramipexole (MIRAPEX) 0.25 MG tablet TAKE 1 TABLET BY MOUTH EVERYDAY AT BEDTIME 90 tablet 2     sertraline (ZOLOFT) 100 MG tablet TAKE 1 TABLET BY MOUTH EVERY DAY 90 tablet 3      sertraline (ZOLOFT) 50 MG tablet TAKE 1 TABLET BY MOUTH EVERY DAY 90 tablet 2     ketoconazole (NIZORAL) 2 % cream APPLY TO AFFECTED AREA IN GROIN AND UNDER BREAST TWICE DAILY AS NEEDED       meloxicam (MOBIC) 15 MG tablet TAKE 1 TABLET BY MOUTH EVERY DAY WITH FOOD       No current facility-administered medications on file prior to visit.       Allergies   Allergen Reactions     Gluten      Wheat Flour        Review of Systems   Constitutional: Negative.    HENT: Negative.    Eyes: Negative.    Respiratory: Negative.    Cardiovascular: Negative.    Gastrointestinal: Negative.    Endocrine: Negative.    Genitourinary: Negative.    Musculoskeletal: Negative.    Skin: Negative.    Neurological: Negative.    Hematological: Negative.    Psychiatric/Behavioral: Negative.         OBJECTIVE: /78 (Patient Site: Left Arm, Patient Position: Sitting, Cuff Size: Adult Regular)   Pulse 82   Wt 141 lb (64 kg)   LMP  (LMP Unknown)   SpO2 98%   BMI 25.79 kg/m     Physical Exam  Constitutional:       General: She is not in acute distress.     Appearance: She is well-developed.   HENT:      Head: Normocephalic and atraumatic.   Eyes:      Conjunctiva/sclera: Conjunctivae normal.   Neck:      Musculoskeletal: Neck supple.   Cardiovascular:      Rate and Rhythm: Normal rate and regular rhythm.   Pulmonary:      Effort: Pulmonary effort is normal.   Musculoskeletal: Normal range of motion.   Skin:     General: Skin is warm and dry.   Neurological:      Mental Status: She is alert and oriented to person, place, and time.          Additional History from Old Records Summarized (2): no  Decision to Obtain Records (1): no  Radiology Tests Summarized or Ordered (1): no  Labs Reviewed or Ordered (1): no  Medicine Test Summarized or Ordered (1): yes  Independent Review of EKG or X-RAY(2 each): no    This note was created using Dragon dictation.  Please excuse any grammatical errors.

## 2021-06-12 NOTE — TELEPHONE ENCOUNTER
venlafaxine (EFFEXOR XR) 75 MG 24 hr capsule [[id number]]    Electronically signed by: Carol Dickinson MD on 10/13/20 1032 Status: Discontinued   Ordering user: Carol Dickinson MD 10/13/20 1032 Authorized by: Carol Dickinson MD   Frequency: DAILY 10/13/20 - 10/30/20  Discontinued by: Carol Dickinson MD 10/30/20 1607 [Dose adjustment]

## 2021-06-12 NOTE — PROGRESS NOTES
ASSESSMENT AND PLAN:      Problem List Items Addressed This Visit        Unprioritized    Anxiety state - Primary     Patient feeling much better with effexor 75mg po q day and wants to try higher dose to see if that is better if not she would revert back to the current dose as that is doing the job, but she is wondering if more would be better.     Increase Effexor from 75mg po q day to 150mg xr daily.  Continue to avoid caffeine and use headspace meditation suly which she has liked.   Follow up in 1 month to determine which dose she likes.         Relevant Medications    venlafaxine (EFFEXOR-XR) 150 MG 24 hr capsule    Insomnia     Controlled with trazodone 50 mg po q day.   In addition she is now doing meditation, using magnesium, and melatonin otc and feeling much better.   Continue current plan follow up in 1 year.                 Chief Complaint   Patient presents with     Medication Management        HPI  Awilda Pandya is a 65 y.o. female feels much better and tells me that her anxiety and insomnia are resolved.  She likes the effexor so much she is wondering if a higher dose would be even better.   She says she is no longer waking up in the middle of the night at 2-3 am and is very happy about that.     Social History     Tobacco Use   Smoking Status Never Smoker   Smokeless Tobacco Never Used      Current Outpatient Medications on File Prior to Visit   Medication Sig Dispense Refill     alendronate (FOSAMAX) 70 MG tablet TAKE 1 TABLET BY MOUTH EVERY 7 DAYS IN THE AM WITH FULL GLASS OF WATER ON EMPTY STOMACH 12 tablet 0     cetirizine (ZYRTEC) 10 MG tablet Take 10 mg by mouth daily.       estradiol (ESTRACE) 0.01 % (0.1 mg/gram) vaginal cream INSERT 2 GRAM BY VAGINAL ROUTE TWICE WEEKLY  0     hydrOXYzine pamoate (VISTARIL) 50 MG capsule Take 1 capsule (50 mg total) by mouth 4 (four) times a day as needed for anxiety. 90 capsule 0     multivitamin (MULTIPLE VITAMIN ESSENTIAL) per tablet Take 1 tablet by mouth 2  (two) times a day.              pramipexole (MIRAPEX) 0.25 MG tablet TAKE 1 TABLET BY MOUTH EVERYDAY AT BEDTIME 90 tablet 2     traZODone (DESYREL) 50 MG tablet TAKE 1 TABLET BY MOUTH EVERYDAY AT BEDTIME 90 tablet 3     No current facility-administered medications on file prior to visit.       Allergies   Allergen Reactions     Gluten      Wheat Flour        Review of Systems   Constitutional: Negative.    HENT: Negative.    Eyes: Negative.    Respiratory: Negative.    Cardiovascular: Negative.    Gastrointestinal: Negative.    Endocrine: Negative.    Genitourinary: Negative.    Musculoskeletal: Negative.    Skin: Negative.    Neurological: Negative.    Hematological: Negative.    Psychiatric/Behavioral: Negative.         OBJECTIVE: /82   Pulse 84   Resp 16   LMP  (LMP Unknown)    Physical Exam  Constitutional:       General: She is not in acute distress.     Appearance: She is well-developed.   HENT:      Head: Normocephalic and atraumatic.   Eyes:      Conjunctiva/sclera: Conjunctivae normal.   Neck:      Musculoskeletal: Neck supple.   Cardiovascular:      Rate and Rhythm: Normal rate and regular rhythm.   Pulmonary:      Effort: Pulmonary effort is normal.   Musculoskeletal: Normal range of motion.   Neurological:      Mental Status: She is alert and oriented to person, place, and time.        Little interest or pleasure in doing things: Not at all  Feeling down, depressed, or hopeless: Not at all  Trouble falling or staying asleep, or sleeping too much: More than half the days  Feeling tired or having little energy: Not at all  Poor appetite or overeating: Nearly every day  Feeling bad about yourself - or that you are a failure or have let yourself or your family down: Several days  Trouble concentrating on things, such as reading the newspaper or watching television: Not at all  Moving or speaking so slowly that other people could have noticed. Or the opposite - being so fidgety or restless that you  have been moving around a lot more than usual: Several days  Thoughts that you would be better off dead, or of hurting yourself in some way: Not at all  PHQ-9 Total Score: 7       This note was created using Dragon dictation.  Please excuse any grammatical errors.

## 2021-06-12 NOTE — TELEPHONE ENCOUNTER
Refill Approved    Rx renewed per Medication Renewal Policy. Medication was last renewed on 9/15/20.    Tiffany Luz, [facility] Triage/Med Refill 10/9/2020     Requested Prescriptions   Pending Prescriptions Disp Refills     traZODone (DESYREL) 50 MG tablet [Pharmacy Med Name: TRAZODONE 50 MG TABLET] 30 tablet 0     Sig: TAKE 1 TABLET BY MOUTH EVERYDAY AT BEDTIME       Tricyclics/Misc Antidepressant/Antianxiety Meds Refill Protocol Passed - 10/7/2020  9:34 AM        Passed - PCP or prescribing provider visit in last year     Last office visit with prescriber/PCP: 9/15/2020 Carol Dickinson MD OR same dept: 9/15/2020 Carol Dickinson MD OR same specialty: 9/15/2020 Carol Dickinson MD  Last physical: 11/26/2019 Last MTM visit: Visit date not found   Next visit within 3 mo: Visit date not found  Next physical within 3 mo: Visit date not found  Prescriber OR PCP: Carol Dickinson MD  Last diagnosis associated with med order: 1. Insomnia, unspecified type  - traZODone (DESYREL) 50 MG tablet [Pharmacy Med Name: TRAZODONE 50 MG TABLET]; TAKE 1 TABLET BY MOUTH EVERYDAY AT BEDTIME  Dispense: 30 tablet; Refill: 0    If protocol passes may refill for 12 months if within 3 months of last provider visit (or a total of 15 months).

## 2021-06-12 NOTE — PROGRESS NOTES
ASSESSMENT AND PLAN:     Problem List Items Addressed This Visit        Unprioritized    Anxiety state - Primary     Currently on zoloft 150mg po q day, but still feels anxious.       Recommend dc all caffeine. She was drinking 6-7 diet cokes per day.      Offered counseling and declined.      Headspace meditation.    Discontinue zoloft.   Start effexor 75mg po q day.  Increase hydroxyzine tabs from 25 mg to 50 mg tabs as she has been using that dose.     Follow up in 2-4 weeks         Relevant Medications    venlafaxine (EFFEXOR XR) 75 MG 24 hr capsule    hydrOXYzine pamoate (VISTARIL) 50 MG capsule    Insomnia     Still waking up feeling anxious around 2-3 am.   Will switch zoloft to effexor.   Discussed bedtime meditation and pathophysiology of how this helps.   Continue trazodone 50 mg po q day.   Could also consider melatonin and magnesium or increase trazodone.   Follow up in 2-4 weeks.                Chief Complaint   Patient presents with     Follow-up        HPI  Awilda Pandya is a 65 y.o. female says she is feeling anxious.  Her granddaughter has cancer and this weighs on her mind.     She is taking zoloft 150mg po q day.     She says vistaril 100mg does help and she says she is using that about twice a day.     Trazodone helps her sleep but she wakes up at 2-3 am feeling anxious.     Declines therapy.     Social History     Tobacco Use   Smoking Status Never Smoker   Smokeless Tobacco Never Used      Current Outpatient Medications on File Prior to Visit   Medication Sig Dispense Refill     alendronate (FOSAMAX) 70 MG tablet TAKE 1 TABLET BY MOUTH EVERY 7 DAYS IN THE AM WITH FULL GLASS OF WATER ON EMPTY STOMACH 12 tablet 0     cetirizine (ZYRTEC) 10 MG tablet Take 10 mg by mouth daily.       estradiol (ESTRACE) 0.01 % (0.1 mg/gram) vaginal cream INSERT 2 GRAM BY VAGINAL ROUTE TWICE WEEKLY  0     multivitamin (MULTIPLE VITAMIN ESSENTIAL) per tablet Take 1 tablet by mouth 2 (two) times a day.               pramipexole (MIRAPEX) 0.25 MG tablet TAKE 1 TABLET BY MOUTH EVERYDAY AT BEDTIME 90 tablet 2     traZODone (DESYREL) 50 MG tablet TAKE 1 TABLET BY MOUTH EVERYDAY AT BEDTIME 90 tablet 3     [DISCONTINUED] hydrOXYzine pamoate (VISTARIL) 50 MG capsule Take 1-2 capsules ( mg total) by mouth 3 (three) times a day as needed for itching. 300 capsule 0     [DISCONTINUED] sertraline (ZOLOFT) 100 MG tablet TAKE 1 TABLET BY MOUTH EVERY DAY 90 tablet 3     [DISCONTINUED] sertraline (ZOLOFT) 50 MG tablet TAKE 1 TABLET BY MOUTH EVERY DAY 90 tablet 2     [DISCONTINUED] ketoconazole (NIZORAL) 2 % cream APPLY TO AFFECTED AREA IN GROIN AND UNDER BREAST TWICE DAILY AS NEEDED       [DISCONTINUED] meloxicam (MOBIC) 15 MG tablet TAKE 1 TABLET BY MOUTH EVERY DAY WITH FOOD       No current facility-administered medications on file prior to visit.       Allergies   Allergen Reactions     Gluten      Wheat Flour        Review of Systems   Constitutional: Negative.    HENT: Negative.    Eyes: Negative.    Respiratory: Negative.    Cardiovascular: Negative.    Gastrointestinal: Negative.    Endocrine: Negative.    Genitourinary: Negative.    Musculoskeletal: Negative.    Skin: Negative.    Neurological: Negative.    Hematological: Negative.    Psychiatric/Behavioral: Negative.         OBJECTIVE: /78 (Patient Site: Left Arm, Patient Position: Sitting, Cuff Size: Adult Regular)   Pulse 65   Wt 143 lb (64.9 kg)   LMP  (LMP Unknown)   SpO2 97%   BMI 26.16 kg/m     Physical Exam  Constitutional:       General: She is not in acute distress.     Appearance: She is well-developed.   HENT:      Head: Normocephalic and atraumatic.   Eyes:      Conjunctiva/sclera: Conjunctivae normal.   Neck:      Musculoskeletal: Neck supple.   Cardiovascular:      Rate and Rhythm: Normal rate and regular rhythm.   Pulmonary:      Effort: Pulmonary effort is normal.   Musculoskeletal: Normal range of motion.   Skin:     General: Skin is warm and dry.    Neurological:      Mental Status: She is alert and oriented to person, place, and time.   Psychiatric:         Attention and Perception: Attention and perception normal.         Mood and Affect: Mood is anxious.         Speech: Speech normal.         Behavior: Behavior normal.         Thought Content: Thought content normal.         Cognition and Memory: Cognition and memory normal.         Judgment: Judgment normal.          Additional History from Old Records Summarized (2): yes  Decision to Obtain Records (1): no  Radiology Tests Summarized or Ordered (1): no  Labs Reviewed or Ordered (1): no  Medicine Test Summarized or Ordered (1): yes  Independent Review of EKG or X-RAY(2 each): no    This note was created using Dragon dictation.  Please excuse any grammatical errors.

## 2021-06-13 ENCOUNTER — COMMUNICATION - HEALTHEAST (OUTPATIENT)
Dept: FAMILY MEDICINE | Facility: CLINIC | Age: 66
End: 2021-06-13

## 2021-06-13 DIAGNOSIS — F41.1 ANXIETY STATE: ICD-10-CM

## 2021-06-13 NOTE — TELEPHONE ENCOUNTER
Refill Approved    Rx renewed per Medication Renewal Policy. Medication was last renewed on 10/13/20.    Tiffany Luz, [facility] Triage/Med Refill 11/16/2020     Requested Prescriptions   Pending Prescriptions Disp Refills     hydrOXYzine pamoate (VISTARIL) 50 MG capsule [Pharmacy Med Name: HYDROXYZINE HEATHER 50 MG CAP] 90 capsule 0     Sig: TAKE 1 CAPSULE (50 MG TOTAL) BY MOUTH 4 (FOUR) TIMES A DAY AS NEEDED FOR ANXIETY.       Antihistamine Refill Protocol Passed - 11/13/2020  1:20 AM        Passed - Patient has had office visit/physical in last year     Last office visit with prescriber/PCP: 10/30/2020 Carol Dickinson MD OR same dept: 10/30/2020 Carol Dickinson MD OR same specialty: 10/30/2020 Carol Dickinson MD  Last physical: 11/26/2019 Last MTM visit: Visit date not found   Next visit within 3 mo: Visit date not found  Next physical within 3 mo: Visit date not found  Prescriber OR PCP: Carol Dickinson MD  Last diagnosis associated with med order: 1. Anxiety state  - hydrOXYzine pamoate (VISTARIL) 50 MG capsule [Pharmacy Med Name: HYDROXYZINE HEATHER 50 MG CAP]; Take 1 capsule (50 mg total) by mouth 4 (four) times a day as needed for anxiety.  Dispense: 90 capsule; Refill: 0    If protocol passes may refill for 12 months if within 3 months of last provider visit (or a total of 15 months).

## 2021-06-13 NOTE — PROGRESS NOTES
ASSESSMENT AND PLAN:     Problem List Items Addressed This Visit        Unprioritized    Anxiety state     Improved on venlafaxine 150mg (increased from 75mg) but still feeling anxious. Wants to try the 225 mg dose.   Will increase to 225 mg dose.  She has been doing a morning meditation and she wants to see if she can watch the news on the treadmill after morning meditation to get in some exercise.   Follow up in 1 month to make sure she feels better.         Relevant Medications    venlafaxine 225 mg TR24    Insomnia     Waking up once in the night around 1 AM.  We talked about doing the nighttime SOS in the [employer] smart phone suly.  We will continue 50 mg of trazodone per day as well.           Other Visit Diagnoses     Immunization due    -  Primary    Relevant Orders    Pneumococcal conjugate vaccine 13-valent 6wks-17yrs; >50yrs (Completed)           Chief Complaint   Patient presents with     Medication Management        HPI  Awilda Pandya is a 65 y.o. female comes in telling me that she is feeling better on the increased dose of venlafaxine.  She really likes it.  She does find that she needs to use the hydroxyzine and wonders if increased dose of venlafaxine would be better.  She really likes the venlafaxine.    She has been doing her morning meditation.  She feels like this is really helpful.  She wants to start an exercise routine.  She feels her day is very busy and she is not sure when she would do this.  She is worried that will she will start gaining weight and lose strength and balance if she does not start an exercise routine.    Social History     Tobacco Use   Smoking Status Never Smoker   Smokeless Tobacco Never Used      Current Outpatient Medications on File Prior to Visit   Medication Sig Dispense Refill     alendronate (FOSAMAX) 70 MG tablet TAKE 1 TABLET BY MOUTH EVERY 7 DAYS IN THE AM WITH FULL GLASS OF WATER ON EMPTY STOMACH 12 tablet 0     cetirizine (ZYRTEC) 10 MG tablet Take 10 mg by  mouth daily.       estradiol (ESTRACE) 0.01 % (0.1 mg/gram) vaginal cream INSERT 2 GRAM BY VAGINAL ROUTE TWICE WEEKLY  0     hydrOXYzine pamoate (VISTARIL) 50 MG capsule TAKE 1 CAPSULE (50 MG TOTAL) BY MOUTH 4 (FOUR) TIMES A DAY AS NEEDED FOR ANXIETY. 90 capsule 10     multivitamin (MULTIPLE VITAMIN ESSENTIAL) per tablet Take 1 tablet by mouth 2 (two) times a day.              pramipexole (MIRAPEX) 0.25 MG tablet TAKE 1 TABLET BY MOUTH EVERYDAY AT BEDTIME 90 tablet 2     traZODone (DESYREL) 50 MG tablet TAKE 1 TABLET BY MOUTH EVERYDAY AT BEDTIME 90 tablet 3     [DISCONTINUED] venlafaxine (EFFEXOR-XR) 150 MG 24 hr capsule Take 1 capsule (150 mg total) by mouth daily. 90 capsule 0     No current facility-administered medications on file prior to visit.       Allergies   Allergen Reactions     Gluten      Wheat Flour          Review of Systems   Constitutional: Negative.    HENT: Negative.    Eyes: Negative.    Respiratory: Negative.    Cardiovascular: Negative.    Gastrointestinal: Negative.    Endocrine: Negative.    Genitourinary: Negative.    Musculoskeletal: Negative.    Skin: Negative.    Neurological: Negative.    Hematological: Negative.    Psychiatric/Behavioral: Negative.         OBJECTIVE: /82   Pulse 84   Resp 16   LMP  (LMP Unknown)    Physical Exam  Constitutional:       General: She is not in acute distress.     Appearance: She is well-developed.   HENT:      Head: Normocephalic and atraumatic.   Eyes:      Conjunctiva/sclera: Conjunctivae normal.   Neck:      Musculoskeletal: Neck supple.   Cardiovascular:      Rate and Rhythm: Normal rate and regular rhythm.   Pulmonary:      Effort: Pulmonary effort is normal.   Musculoskeletal: Normal range of motion.   Neurological:      Mental Status: She is alert and oriented to person, place, and time.   Psychiatric:         Mood and Affect: Mood normal.         Behavior: Behavior normal.         Thought Content: Thought content normal.         Judgment:  Judgment normal.      Comments: Slight anxiety notable, but very mild          This note was created using Dragon dictation.  Please excuse any grammatical errors.

## 2021-06-13 NOTE — TELEPHONE ENCOUNTER
RN cannot approve Refill Request    RN can NOT refill this medication Protocol failed and NO refill given. Last office visit: 10/23/2019 Mauro Mckeon MD Last Physical: Visit date not found Last MTM visit: Visit date not found Last visit same specialty: 10/23/2019 Mauro Mckeon MD.  Next visit within 3 mo: Visit date not found  Next physical within 3 mo: Visit date not found      Tiffany Luz, [facility] Triage/Med Refill 12/9/2020    Requested Prescriptions   Pending Prescriptions Disp Refills     alendronate (FOSAMAX) 70 MG tablet [Pharmacy Med Name: ALENDRONATE SODIUM 70 MG TAB] 12 tablet 0     Sig: TAKE 1 TABLET BY MOUTH EVERY 7 DAYS IN THE AM WITH FULL GLASS OF WATER ON EMPTY STOMACH       Biphosphonates Refill Protocol Failed - 12/8/2020 12:28 PM        Failed - Serum creatinine in last 12 months     Creatinine   Date Value Ref Range Status   11/26/2019 0.72 0.60 - 1.10 mg/dL Final             Passed - PCP or prescribing provider visit in last 12 months     Last office visit with prescriber/PCP: 10/23/2019 Mauro Mckeon MD OR same dept: Visit date not found OR same specialty: 10/23/2019 Mauro Mckeon MD  Last physical: Visit date not found Last MTM visit: Visit date not found   Next visit within 3 mo: Visit date not found  Next physical within 3 mo: Visit date not found  Prescriber OR PCP: Mauro Mckeon MD  Last diagnosis associated with med order: 1. Senile osteoporosis  - alendronate (FOSAMAX) 70 MG tablet [Pharmacy Med Name: ALENDRONATE SODIUM 70 MG TAB]; TAKE 1 TABLET BY MOUTH EVERY 7 DAYS IN THE AM WITH FULL GLASS OF WATER ON EMPTY STOMACH  Dispense: 12 tablet; Refill: 0    If protocol passes may refill for 12 months if within 3 months of last provider visit (or a total of 15 months).

## 2021-06-13 NOTE — TELEPHONE ENCOUNTER
RN cannot approve Refill Request    RN can NOT refill this medication Protocol failed and NO refill given and medication not on med list. Last office visit: 12/2/2020 Carol Dickinson MD Last Physical: 11/26/2019 Last MTM visit: Visit date not found Last visit same specialty: 12/2/2020 Carol Dickinson MD.  Next visit within 3 mo: Visit date not found  Next physical within 3 mo: Visit date not found      Tiffany Luz, [facility] Triage/Med Refill 12/10/2020    Requested Prescriptions   Pending Prescriptions Disp Refills     venlafaxine (EFFEXOR-XR) 150 MG 24 hr capsule [Pharmacy Med Name: VENLAFAXINE HCL  MG CAP] 90 capsule 0     Sig: TAKE 1 CAPSULE BY MOUTH EVERY DAY       Venlafaxine/Desvenlafaxine Refill Protocol Failed - 12/9/2020 10:22 AM        Failed - LFT or AST or ALT in last year     Albumin   Date Value Ref Range Status   11/26/2019 3.9 3.5 - 5.0 g/dL Final     Bilirubin, Total   Date Value Ref Range Status   11/26/2019 0.3 0.0 - 1.0 mg/dL Final     Alkaline Phosphatase   Date Value Ref Range Status   11/26/2019 85 45 - 120 U/L Final     AST   Date Value Ref Range Status   11/26/2019 17 0 - 40 U/L Final     ALT   Date Value Ref Range Status   11/26/2019 <9 0 - 45 U/L Final     Protein, Total   Date Value Ref Range Status   11/26/2019 7.0 6.0 - 8.0 g/dL Final                Failed - Fasting lipid cascade in last year     Cholesterol   Date Value Ref Range Status   11/26/2019 203 (H) <=199 mg/dL Final     Triglycerides   Date Value Ref Range Status   11/26/2019 70 <=149 mg/dL Final     HDL Cholesterol   Date Value Ref Range Status   11/26/2019 62 >=50 mg/dL Final     LDL Calculated   Date Value Ref Range Status   11/26/2019 127 <=129 mg/dL Final     Patient Fasting > 8hrs?   Date Value Ref Range Status   11/26/2019 Yes  Final             Passed - PCP or prescribing provider visit in last year     Last office visit with prescriber/PCP: 12/2/2020 Carol Dickinson MD OR same dept:  12/2/2020 Carol Dickinson MD OR same specialty: 12/2/2020 Carol Dickinson MD  Last physical: 11/26/2019 Last MTM visit: Visit date not found   Next visit within 3 mo: Visit date not found  Next physical within 3 mo: Visit date not found  Prescriber OR PCP: Carol Dickinson MD  Last diagnosis associated with med order: 1. Anxiety state  - venlafaxine (EFFEXOR-XR) 150 MG 24 hr capsule [Pharmacy Med Name: VENLAFAXINE HCL  MG CAP]; TAKE 1 CAPSULE BY MOUTH EVERY DAY  Dispense: 90 capsule; Refill: 0  - venlafaxine (EFFEXOR-XR) 75 MG 24 hr capsule [Pharmacy Med Name: VENLAFAXINE HCL ER 75 MG CAP]; TAKE 1 CAPSULE BY MOUTH EVERY DAY  Dispense: 30 capsule; Refill: 0    If protocol passes may refill for 12 months if within 3 months of last provider visit (or a total of 15 months).             Passed - Blood Pressure in last year     BP Readings from Last 1 Encounters:   12/02/20 128/82                venlafaxine (EFFEXOR-XR) 75 MG 24 hr capsule [Pharmacy Med Name: VENLAFAXINE HCL ER 75 MG CAP] 30 capsule 0     Sig: TAKE 1 CAPSULE BY MOUTH EVERY DAY       Venlafaxine/Desvenlafaxine Refill Protocol Failed - 12/9/2020 10:22 AM        Failed - LFT or AST or ALT in last year     Albumin   Date Value Ref Range Status   11/26/2019 3.9 3.5 - 5.0 g/dL Final     Bilirubin, Total   Date Value Ref Range Status   11/26/2019 0.3 0.0 - 1.0 mg/dL Final     Alkaline Phosphatase   Date Value Ref Range Status   11/26/2019 85 45 - 120 U/L Final     AST   Date Value Ref Range Status   11/26/2019 17 0 - 40 U/L Final     ALT   Date Value Ref Range Status   11/26/2019 <9 0 - 45 U/L Final     Protein, Total   Date Value Ref Range Status   11/26/2019 7.0 6.0 - 8.0 g/dL Final                Failed - Fasting lipid cascade in last year     Cholesterol   Date Value Ref Range Status   11/26/2019 203 (H) <=199 mg/dL Final     Triglycerides   Date Value Ref Range Status   11/26/2019 70 <=149 mg/dL Final     HDL Cholesterol   Date Value Ref  Range Status   11/26/2019 62 >=50 mg/dL Final     LDL Calculated   Date Value Ref Range Status   11/26/2019 127 <=129 mg/dL Final     Patient Fasting > 8hrs?   Date Value Ref Range Status   11/26/2019 Yes  Final             Passed - PCP or prescribing provider visit in last year     Last office visit with prescriber/PCP: 12/2/2020 Carol Dickinson MD OR same dept: 12/2/2020 Carol Dickinson MD OR same specialty: 12/2/2020 Carol Dickinson MD  Last physical: 11/26/2019 Last MTM visit: Visit date not found   Next visit within 3 mo: Visit date not found  Next physical within 3 mo: Visit date not found  Prescriber OR PCP: Carol Dickinson MD  Last diagnosis associated with med order: 1. Anxiety state  - venlafaxine (EFFEXOR-XR) 150 MG 24 hr capsule [Pharmacy Med Name: VENLAFAXINE HCL  MG CAP]; TAKE 1 CAPSULE BY MOUTH EVERY DAY  Dispense: 90 capsule; Refill: 0  - venlafaxine (EFFEXOR-XR) 75 MG 24 hr capsule [Pharmacy Med Name: VENLAFAXINE HCL ER 75 MG CAP]; TAKE 1 CAPSULE BY MOUTH EVERY DAY  Dispense: 30 capsule; Refill: 0    If protocol passes may refill for 12 months if within 3 months of last provider visit (or a total of 15 months).             Passed - Blood Pressure in last year     BP Readings from Last 1 Encounters:   12/02/20 128/82

## 2021-06-14 NOTE — TELEPHONE ENCOUNTER
RN cannot approve Refill Request    RN can NOT refill this medication Protocol failed and NO refill given. Last office visit: 12/2/2020 Carol Dickinson MD Last Physical: 11/26/2019 Last MTM visit: Visit date not found Last visit same specialty: 12/2/2020 Carol Dickinson MD.  Next visit within 3 mo: Visit date not found  Next physical within 3 mo: Visit date not found      Tiffany Luz, [facility] Triage/Med Refill 1/6/2021    Requested Prescriptions   Pending Prescriptions Disp Refills     venlafaxine (EFFEXOR-XR) 75 MG 24 hr capsule [Pharmacy Med Name: VENLAFAXINE HCL ER 75 MG CAP] 30 capsule 0     Sig: TAKE 1 CAPSULE BY MOUTH EVERY DAY       Venlafaxine/Desvenlafaxine Refill Protocol Failed - 1/5/2021  2:37 PM        Failed - LFT or AST or ALT in last year     Albumin   Date Value Ref Range Status   11/26/2019 3.9 3.5 - 5.0 g/dL Final     Bilirubin, Total   Date Value Ref Range Status   11/26/2019 0.3 0.0 - 1.0 mg/dL Final     Alkaline Phosphatase   Date Value Ref Range Status   11/26/2019 85 45 - 120 U/L Final     AST   Date Value Ref Range Status   11/26/2019 17 0 - 40 U/L Final     ALT   Date Value Ref Range Status   11/26/2019 <9 0 - 45 U/L Final     Protein, Total   Date Value Ref Range Status   11/26/2019 7.0 6.0 - 8.0 g/dL Final                Failed - Fasting lipid cascade in last year     Cholesterol   Date Value Ref Range Status   11/26/2019 203 (H) <=199 mg/dL Final     Triglycerides   Date Value Ref Range Status   11/26/2019 70 <=149 mg/dL Final     HDL Cholesterol   Date Value Ref Range Status   11/26/2019 62 >=50 mg/dL Final     LDL Calculated   Date Value Ref Range Status   11/26/2019 127 <=129 mg/dL Final     Patient Fasting > 8hrs?   Date Value Ref Range Status   11/26/2019 Yes  Final             Passed - PCP or prescribing provider visit in last year     Last office visit with prescriber/PCP: 12/2/2020 Carol Dickinson MD OR same dept: 12/2/2020 Carol Dickinson MD OR same  specialty: 12/2/2020 Carol Dickinson MD  Last physical: 11/26/2019 Last MTM visit: Visit date not found   Next visit within 3 mo: Visit date not found  Next physical within 3 mo: Visit date not found  Prescriber OR PCP: Carol Dickinson MD  Last diagnosis associated with med order: 1. Anxiety state  - venlafaxine (EFFEXOR-XR) 75 MG 24 hr capsule [Pharmacy Med Name: VENLAFAXINE HCL ER 75 MG CAP]; TAKE 1 CAPSULE BY MOUTH EVERY DAY  Dispense: 30 capsule; Refill: 0    If protocol passes may refill for 12 months if within 3 months of last provider visit (or a total of 15 months).             Passed - Blood Pressure in last year     BP Readings from Last 1 Encounters:   12/02/20 128/82

## 2021-06-14 NOTE — TELEPHONE ENCOUNTER
Left message to call back for: appt  Information to relay to patient: appt needed VV w/lab or f2f   please schedule upon return call

## 2021-06-14 NOTE — PROGRESS NOTES
ASSESSMENT AND PLAN:      Problem List Items Addressed This Visit        Unprioritized    Anxiety state     Likes effexor 225 mg wants to keep that dose.   Follow up in 6 months or sooner if needed.         Relevant Medications    venlafaxine (EFFEXOR-XR) 75 MG 24 hr capsule    venlafaxine (EFFEXOR-XR) 150 MG 24 hr capsule    Eating disorder     Notes stress eating in evening, wonders if it is binge eating or stress eating or compulsive eating. Offered [facility]les program evaluation- she declined for now.            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 Worry about rheumatoid arthritis, she describes her mother having ulnar deformity of the hands. She likes to color and has several hobbies requiring fine motor finger dexterity         Relevant Orders    Erythrocyte Sedimentation Rate (Completed)    C-Reactive Protein (Completed)    Antinuclear Antibody (YANNI) [place]    CCP Antibodies    Rheumatoid Factor Quant (Completed)    Ambulatory referral to Rheumatology    XR Hands Bilateral 3 or More VWS      Other Visit Diagnoses     Encounter for therapeutic drug monitoring    -  Primary    Relevant Orders    Basic Metabolic Panel (Completed)           Chief Complaint   Patient presents with     Medication Management        HPI  Awilda Pandya is a 65 y.o. female comes in worried about binge eating disorder. She says she has been overeating in the evenings and has even found herself eating two sleeves of ritz crackers which she knows will upset her stomach but she does it anyway.     She also likes coloring and other hobbies that require good finger dexterity and fine motor function but she is starting to see her fingers deform like her moms did.     She likes her increased effexor dose and wants to continue that.     Social History      Tobacco Use   Smoking Status Never Smoker   Smokeless Tobacco Never Used      Current Outpatient Medications on File Prior to Visit   Medication Sig Dispense Refill     alendronate (FOSAMAX) 70 MG tablet TAKE 1 TABLET BY MOUTH EVERY 7 DAYS IN THE AM WITH FULL GLASS OF WATER ON EMPTY STOMACH 12 tablet 3     cetirizine (ZYRTEC) 10 MG tablet Take 10 mg by mouth daily.       estradiol (ESTRACE) 0.01 % (0.1 mg/gram) vaginal cream INSERT 2 GRAM BY VAGINAL ROUTE TWICE WEEKLY  0     hydrOXYzine pamoate (VISTARIL) 50 MG capsule TAKE 1 CAPSULE (50 MG TOTAL) BY MOUTH 4 (FOUR) TIMES A DAY AS NEEDED FOR ANXIETY. 90 capsule 10     multivitamin (MULTIPLE VITAMIN ESSENTIAL) per tablet Take 1 tablet by mouth 2 (two) times a day.              pramipexole (MIRAPEX) 0.25 MG tablet TAKE 1 TABLET BY MOUTH EVERYDAY AT BEDTIME 90 tablet 2     traZODone (DESYREL) 50 MG tablet TAKE 1 TABLET BY MOUTH EVERYDAY AT BEDTIME 90 tablet 3     No current facility-administered medications on file prior to visit.       Allergies   Allergen Reactions     Gluten      Wheat Flour          OBJECTIVE: /82   Pulse 80   Resp 16   LMP  (LMP Unknown)    Physical Exam  Constitutional:       General: She is not in acute distress.     Appearance: She is well-developed.   HENT:      Head: Normocephalic and atraumatic.   Eyes:      Conjunctiva/sclera: Conjunctivae normal.   Neck:      Musculoskeletal: Neck supple.   Cardiovascular:      Rate and Rhythm: Normal rate and regular rhythm.   Pulmonary:      Effort: Pulmonary effort is normal.   Musculoskeletal: Normal range of motion.      Comments: Mild ulnar deformity seen in right index finger dip joint   Skin:     General: Skin is warm and dry.   Neurological:      Mental Status: She is alert and oriented to person, place, and time.   Psychiatric:         Attention and Perception: Attention and perception normal.         Mood and Affect: Mood and affect normal.         Speech: Speech normal.          Behavior: Behavior normal. Behavior is cooperative.         Thought Content: Thought content normal.         Cognition and Memory: Cognition and memory normal.         Judgment: Judgment normal.          Additional History from Old Records Summarized (2): yes  Decision to Obtain Records (1): no  Radiology Tests Summarized or Ordered (1): yes  Labs Reviewed or Ordered (1): yes  Medicine Test Summarized or Ordered (1): yes  Independent Review of EKG or X-RAY(2 each): no    This note was created using Dragon dictation.  Please excuse any grammatical errors.

## 2021-06-14 NOTE — TELEPHONE ENCOUNTER
Med changed 12/02/20 need 1 month follow up and should do labs with highere dose effexor on board - bmp.     Can she come into clinic, or I could do video visit/ lab appt.

## 2021-06-14 NOTE — TELEPHONE ENCOUNTER
Left message to call back for: appt  Information to relay to patient: message left to make appt, my chart message sent

## 2021-06-15 ENCOUNTER — OFFICE VISIT - HEALTHEAST (OUTPATIENT)
Dept: FAMILY MEDICINE | Facility: CLINIC | Age: 66
End: 2021-06-15

## 2021-06-15 DIAGNOSIS — F41.1 ANXIETY STATE: ICD-10-CM

## 2021-06-15 DIAGNOSIS — F50.819 BINGE EATING DISORDER: ICD-10-CM

## 2021-06-15 RX ORDER — SERTRALINE HYDROCHLORIDE 100 MG/1
200 TABLET, FILM COATED ORAL DAILY
Qty: 180 TABLET | Refills: 3 | Status: SHIPPED | OUTPATIENT
Start: 2021-06-15 | End: 2022-11-23

## 2021-06-15 ASSESSMENT — PATIENT HEALTH QUESTIONNAIRE - PHQ9: SUM OF ALL RESPONSES TO PHQ QUESTIONS 1-9: 4

## 2021-06-15 ASSESSMENT — ANXIETY QUESTIONNAIRES
7. FEELING AFRAID AS IF SOMETHING AWFUL MIGHT HAPPEN: MORE THAN HALF THE DAYS
GAD7 TOTAL SCORE: 9
2. NOT BEING ABLE TO STOP OR CONTROL WORRYING: MORE THAN HALF THE DAYS
4. TROUBLE RELAXING: SEVERAL DAYS
3. WORRYING TOO MUCH ABOUT DIFFERENT THINGS: SEVERAL DAYS
5. BEING SO RESTLESS THAT IT IS HARD TO SIT STILL: NOT AT ALL
IF YOU CHECKED OFF ANY PROBLEMS ON THIS QUESTIONNAIRE, HOW DIFFICULT HAVE THESE PROBLEMS MADE IT FOR YOU TO DO YOUR WORK, TAKE CARE OF THINGS AT HOME, OR GET ALONG WITH OTHER PEOPLE: SOMEWHAT DIFFICULT
6. BECOMING EASILY ANNOYED OR IRRITABLE: SEVERAL DAYS
1. FEELING NERVOUS, ANXIOUS, OR ON EDGE: MORE THAN HALF THE DAYS

## 2021-06-15 NOTE — ASSESSMENT & PLAN NOTE
At our last visit in May we discontinued effexor and restarted zoloft.  She is here for follow up today. She is hoping to have zoloft increased today although she does like it and feels it is working she wants to see if she likes a higher dose better.  Increase zoloft 150mg to 200 mg po q day.   Doing well but wans to see if she likes the 200 mg dose better.   Follow up in 1 month

## 2021-06-15 NOTE — PROGRESS NOTES
Assessment/Plan:        1. Senile osteoporosis  DXA Bone Density Scan       Return in about 1 year (around 2/23/2022) for Recheck, osteoporosis.    Patient Instructions   Schedule DXA scan in October 2021 in [place]. [phone]    Continue Fosamax weekly.      Chief Complaint   Patient presents with     Osteoporosis Follow Up     Osteo f/U    66 yo female is here for osteoporosis follow up.  We started Fosamax in 10/2019 and she tolerates it well.  She had last DXA scan done in 9/2019.  She is taking calcium and vit D.  I reviewed the labs:  Component      Latest Ref Rng & Units 1/8/2021   Sodium      136 - 145 mmol/L 141   Potassium      3.5 - 5.0 mmol/L 4.1   Chloride      98 - 107 mmol/L 106   CO2      22 - 31 mmol/L 25   Anion Gap, Calculation      5 - 18 mmol/L 10   Glucose      70 - 125 mg/dL 81   Calcium      8.5 - 10.5 mg/dL 9.3   BUN      8 - 22 mg/dL 13   Creatinine      0.60 - 1.10 mg/dL 0.70   GFR MDRD Af Amer      >60 mL/min/1.73m2 >60   GFR MDRD Non Af Amer      >60 mL/min/1.73m2 >60     Component      Latest Ref Rng & Units 10/23/2019   Vitamin D, Total (25-Hydroxy)      30.0 - 80.0 ng/mL 36.9     We will continue Fosamax weekly. She will schedule DXA in Oct 2021. I will see her in a year.     /78   Pulse 86   Wt 153 lb 11.2 oz (69.7 kg)   LMP  (LMP Unknown)   SpO2 98%   BMI 28.11 kg/m      This note has been dictated using voice recognition software. Any grammatical or context distortions are unintentional and inherent to the software      Patient Active Problem List   Diagnosis     H/O colonoscopy     Encounter for preventive care     S/P vaginal hysterectomy     Dyslipidemia     Allergic rhinitis, unspecified     Hepatic steatosis     Restless legs syndrome     Senile osteoporosis     Fracture of left wrist with routine healing     History of basal cell carcinoma     Anxiety state     Diverticulosis of large intestine     Insomnia     Eating disorder     Finger deformity, right        Current Outpatient Medications on File Prior to Visit   Medication Sig Dispense Refill     alendronate (FOSAMAX) 70 MG tablet TAKE 1 TABLET BY MOUTH EVERY 7 DAYS IN THE AM WITH FULL GLASS OF WATER ON EMPTY STOMACH 12 tablet 3     cetirizine (ZYRTEC) 10 MG tablet Take 10 mg by mouth daily.       estradiol (ESTRACE) 0.01 % (0.1 mg/gram) vaginal cream INSERT 2 GRAM BY VAGINAL ROUTE TWICE WEEKLY  0     hydrOXYzine pamoate (VISTARIL) 50 MG capsule TAKE 1 CAPSULE (50 MG TOTAL) BY MOUTH 4 (FOUR) TIMES A DAY AS NEEDED FOR ANXIETY. 90 capsule 10     multivitamin (MULTIPLE VITAMIN ESSENTIAL) per tablet Take 1 tablet by mouth 2 (two) times a day.              pramipexole (MIRAPEX) 0.25 MG tablet TAKE 1 TABLET BY MOUTH EVERYDAY AT BEDTIME 90 tablet 2     traZODone (DESYREL) 50 MG tablet TAKE 1 TABLET BY MOUTH EVERYDAY AT BEDTIME 90 tablet 3     venlafaxine (EFFEXOR-XR) 150 MG 24 hr capsule Take 1 capsule (150 mg total) by mouth daily. 90 capsule 3     venlafaxine (EFFEXOR-XR) 75 MG 24 hr capsule Take 1 capsule (75 mg total) by mouth daily. 90 capsule 3     No current facility-administered medications on file prior to visit.

## 2021-06-16 PROBLEM — Z00.00 ENCOUNTER FOR PREVENTIVE CARE: Status: ACTIVE | Noted: 2017-06-20

## 2021-06-16 PROBLEM — G47.00 INSOMNIA: Status: ACTIVE | Noted: 2020-09-21

## 2021-06-16 PROBLEM — E78.5 DYSLIPIDEMIA: Status: ACTIVE | Noted: 2017-06-20

## 2021-06-16 PROBLEM — Z98.890 H/O COLONOSCOPY: Status: ACTIVE | Noted: 2017-06-20

## 2021-06-16 PROBLEM — Z85.828 HISTORY OF BASAL CELL CARCINOMA: Status: ACTIVE | Noted: 2019-11-26

## 2021-06-16 PROBLEM — M81.0 SENILE OSTEOPOROSIS: Status: ACTIVE | Noted: 2019-10-23

## 2021-06-16 PROBLEM — S62.102D FRACTURE OF LEFT WRIST WITH ROUTINE HEALING: Status: ACTIVE | Noted: 2019-10-23

## 2021-06-16 PROBLEM — K57.30 DIVERTICULOSIS OF LARGE INTESTINE: Status: ACTIVE | Noted: 2019-11-26

## 2021-06-16 PROBLEM — K76.0 HEPATIC STEATOSIS: Status: ACTIVE | Noted: 2018-10-30

## 2021-06-16 PROBLEM — M20.001 FINGER DEFORMITY, RIGHT: Status: ACTIVE | Noted: 2021-01-11

## 2021-06-16 PROBLEM — G25.81 RESTLESS LEGS SYNDROME: Status: ACTIVE | Noted: 2019-04-04

## 2021-06-16 PROBLEM — F50.9 EATING DISORDER: Status: ACTIVE | Noted: 2021-01-11

## 2021-06-16 NOTE — TELEPHONE ENCOUNTER
Refill Approved    Rx renewed per Medication Renewal Policy. Medication was last renewed on 07/21/2020.  Last office visit was 02/23/2021 with Dr Mckeon.    Daksha Rosas, [facility] Triage/Med Refill 4/20/2021     Requested Prescriptions   Pending Prescriptions Disp Refills     pramipexole (MIRAPEX) 0.25 MG tablet [Pharmacy Med Name: PRAMIPEXOLE 0.25 MG TABLET] 90 tablet 2     Sig: TAKE 1 TABLET BY MOUTH EVERYDAY AT BEDTIME       Parkinson's Meds I Refill Protocol Passed - 4/20/2021 12:30 AM        Passed - PCP or prescribing provider visit in past 6 months      Last office visit with prescriber/PCP: 1/8/2021 OR same dept: 1/8/2021 Carol Dickinson MD OR same specialty: 1/8/2021 Carol Dickinson MD Last physical: Visit date not found Last MTM visit: Visit date not found     Next appt within 3 mo: Visit date not found  Next physical within 3 mo: Visit date not found  Prescriber OR PCP: Carol Dickinson MD  Last diagnosis associated with med order: 1. Restless legs syndrome  - pramipexole (MIRAPEX) 0.25 MG tablet [Pharmacy Med Name: PRAMIPEXOLE 0.25 MG TABLET]; TAKE 1 TABLET BY MOUTH EVERYDAY AT BEDTIME  Dispense: 90 tablet; Refill: 2    If protocol passes may refill for 6 months if within 3 months of last provider visit (or a total of 9 months).             Pramipexole/Ropinirole Refill Protocol Passed - 4/20/2021 12:30 AM        Passed - PCP or prescribing provider visit in past 6 months      Last office visit with prescriber/PCP: 1/8/2021 OR same dept: 1/8/2021 Carol Dickinson MD OR same specialty: 1/8/2021 Carol Dickinson MD Last physical: Visit date not found Last MTM visit: Visit date not found         Next appt within 3 mo: Visit date not found  Next physical within 3 mo: Visit date not found  Prescriber OR PCP: Carol Dickinson MD  Last diagnosis associated with med order: 1. Restless legs syndrome  - pramipexole (MIRAPEX) 0.25 MG tablet [Pharmacy Med Name: PRAMIPEXOLE 0.25 MG  TABLET]; TAKE 1 TABLET BY MOUTH EVERYDAY AT BEDTIME  Dispense: 90 tablet; Refill: 2     If protocol passes may refill for 6 months if within 3 months of last provider visit (or a total of 9 months).

## 2021-06-17 NOTE — PROGRESS NOTES
ASSESSMENT AND PLAN:     Problem List Items Addressed This Visit        Unprioritized    Anxiety state - Primary     Discontinue effexor immediately  Restart zoloft 150 immediately     Call if not feeling well.   Plan follow up in 1 month. She thinks she might need higher dose of zoloft than 150 so we can revisit this in 1 month.            Relevant Medications    sertraline (ZOLOFT) 100 MG tablet    sertraline (ZOLOFT) 50 MG tablet           Chief Complaint   Patient presents with     discuss meds        HPI  Awilda Pandya is a 66 y.o. female comes in to discuss adjustment of her anxiety medications.    She had been on Zoloft and then we switched to Effexor to see if she likes that better.  She has now been on Effexor for about 6 months and feels that she liked the Zoloft better.  She would like to revert.  She also thinks she may have needed a higher dose of the Zoloft than the original 150 mg that she was on.    In addition she is using hydroxyzine about every 3 hours which works well for her but she wonders if she could get a better maintenance medication so she does not need so much hydroxyzine.    Social History     Tobacco Use   Smoking Status Never Smoker   Smokeless Tobacco Never Used      Current Outpatient Medications on File Prior to Visit   Medication Sig Dispense Refill     alendronate (FOSAMAX) 70 MG tablet TAKE 1 TABLET BY MOUTH EVERY 7 DAYS IN THE AM WITH FULL GLASS OF WATER ON EMPTY STOMACH 12 tablet 3     cetirizine (ZYRTEC) 10 MG tablet Take 10 mg by mouth daily.       estradiol (ESTRACE) 0.01 % (0.1 mg/gram) vaginal cream INSERT 2 GRAM BY VAGINAL ROUTE TWICE WEEKLY  0     hydrOXYzine pamoate (VISTARIL) 50 MG capsule TAKE 1 CAPSULE (50 MG TOTAL) BY MOUTH 4 (FOUR) TIMES A DAY AS NEEDED FOR ANXIETY. 90 capsule 10     ketoconazole (NIZORAL) 2 % cream APPLY TO AFFECTED AREA IN GROIN AND UNDER BREAST TWICE DAILY AS NEEDED       metroNIDAZOLE (METROCREAM) 0.75 % cream APPLY TO FACE TWICE A DAY        "multivitamin (MULTIPLE VITAMIN ESSENTIAL) per tablet Take 1 tablet by mouth 2 (two) times a day.              pramipexole (MIRAPEX) 0.25 MG tablet TAKE 1 TABLET BY MOUTH EVERYDAY AT BEDTIME 90 tablet 1     traZODone (DESYREL) 50 MG tablet TAKE 1 TABLET BY MOUTH EVERYDAY AT BEDTIME 90 tablet 3     [DISCONTINUED] venlafaxine (EFFEXOR-XR) 150 MG 24 hr capsule Take 1 capsule (150 mg total) by mouth daily. 90 capsule 3     [DISCONTINUED] venlafaxine (EFFEXOR-XR) 75 MG 24 hr capsule Take 1 capsule (75 mg total) by mouth daily. 90 capsule 3     No current facility-administered medications on file prior to visit.       Allergies   Allergen Reactions     Gluten      Wheat Flour          OBJECTIVE: /60 (Patient Site: Left Arm, Patient Position: Sitting, Cuff Size: Adult Regular)   Pulse 90   Ht 5' 2\" (1.575 m)   Wt 159 lb (72.1 kg)   LMP  (LMP Unknown)   SpO2 99%   BMI 29.08 kg/m     Physical Exam   Little interest or pleasure in doing things: Not at all  Feeling down, depressed, or hopeless: Not at all  Trouble falling or staying asleep, or sleeping too much: More than half the days  Feeling tired or having little energy: Not at all  Poor appetite or overeating: More than half the days  Feeling bad about yourself - or that you are a failure or have let yourself or your family down: Not at all  Trouble concentrating on things, such as reading the newspaper or watching television: Several days  Moving or speaking so slowly that other people could have noticed. Or the opposite - being so fidgety or restless that you have been moving around a lot more than usual: Not at all  Thoughts that you would be better off dead, or of hurting yourself in some way: Not at all  PHQ-9 Total Score: 5     CAROL-7 Screening Results:  Feeling nervous, anxious or on edge: 3 (5/18/2021  3:00 PM)  Not being able to stop or control worry: 1 (5/18/2021  3:00 PM)  Worrying too much about different things: 1 (5/18/2021  3:00 PM)  Trouble " relaxin (2021  3:00 PM)  Being so restless that is is hard to sit still: 1 (2021  3:00 PM)  Becoming easily annnoyed or irritable: 2 (2021  3:00 PM)  Feeling afraid as if something awful might happen: 3 (2021  3:00 PM)  CAROL-7 Total: 13 (2021  3:00 PM)  How difficult did these problems make it for you to do your work, take care of things at home or get along with other people? : Somewhat difficult (2021  3:00 PM)  How difficult did these problems make it for you to do your work, take care of things at home or get along with other people? : Somewhat difficult (2021  3:00 PM)       This note was created using Dragon dictation.  Please excuse any grammatical errors.

## 2021-06-17 NOTE — PATIENT INSTRUCTIONS - HE
Discontinue effexor immediately  Restart zoloft 150 immediately     Call if not feeling well.   Plan follow up in 1 month.

## 2021-06-19 NOTE — LETTER
Letter by Carol Dickinson MD at      Author: Carol Dickinson MD Service: -- Author Type: --    Filed:  Encounter Date: 4/11/2019 Status: (Other)         Awilda Pandya  [address] [facility] [address]             April 11, 2019         Dear Ms. Pandya,    Below are the results from your recent visit:    Resulted Orders   Ferritin   Result Value Ref Range    Ferritin 77 10 - 130 ng/mL   HM1 (CBC with Diff)   Result Value Ref Range    WBC 7.1 4.0 - 11.0 thou/uL    RBC 4.85 3.80 - 5.40 mill/uL    Hemoglobin 14.4 12.0 - 16.0 g/dL    Hematocrit 43.7 35.0 - 47.0 %    MCV 90 80 - 100 fL    MCH 29.7 27.0 - 34.0 pg    MCHC 33.0 32.0 - 36.0 g/dL    RDW 11.6 11.0 - 14.5 %    Platelets 294 140 - 440 thou/uL    MPV 7.2 7.0 - 10.0 fL    Neutrophils % 55 50 - 70 %    Lymphocytes % 33 20 - 40 %    Monocytes % 8 2 - 10 %    Eosinophils % 4 0 - 6 %    Basophils % 1 0 - 2 %    Neutrophils Absolute 3.9 2.0 - 7.7 thou/uL    Lymphocytes Absolute 2.4 0.8 - 4.4 thou/uL    Monocytes Absolute 0.6 0.0 - 0.9 thou/uL    Eosinophils Absolute 0.3 0.0 - 0.4 thou/uL    Basophils Absolute 0.1 0.0 - 0.2 thou/uL         Please call with questions or contact us using [employer].    Sincerely,        Electronically signed by Carol Dickinson MD

## 2021-06-20 NOTE — LETTER
Letter by Carol Dickinson MD at      Author: Carol Dickinson MD Service: -- Author Type: --    Filed:  Encounter Date: 9/15/2020 Status: (Other)                    My Depression Action Plan  Name: Awilda Pandya   Date of Birth 1955  Date: 9/15/2020    My Doctor: Carol Dickinson MD   My Clinic: [facility]  [address]  [facility] [id number]  [phone]          GREEN    ZONE   Good Control    What it looks like:     Things are going generally well. You have normal ups and downs. You may even feel depressed from time to time, but bad moods usually last less than a day.   What you need to do:  1. Continue to care for yourself (see self care plan)  2. Check your depression survival kit and update it as needed  3. Follow your physicians recommendations including any medication.  4. Do not stop taking medication unless you consult with your physician first.           YELLOW         ZONE Getting Worse    What it looks like:     Depression is starting to interfere with your life.     It may be hard to get out of bed; you may be starting to isolate yourself from others.    Symptoms of depression are starting to last most all day and this has happened for several days.     You may have suicidal thoughts but they are not constant.   What you need to do:     1. Call your care team. Your response to treatment will improve if you keep your care team informed of your progress. Yellow periods are signs an adjustment may need to be made.     2. Continue your self-care.  Just get dressed and ready for the day.  Don't give yourself time to talk yourself out of it.    3. Talk to someone in your support network.    4. Open up your depression Depression Self-Care Plan / Wellness kit.           RED    ZONE Medical Alert - Get Help    What it looks like:     Depression is seriously interfering with your life.     You may experience these or other symptoms: You cant get out of bed most days,  cant work or engage in other necessary activities, you have trouble taking care of basic hygiene, or basic responsibilities, thoughts of suicide or death that will not go away, self-injurious behavior.     What you need to do:  1. Call your care team and request a same-day appointment. If they are not available (weekends or after hours) call your local crisis line, emergency room or 911.            Self-Care Plan / Wellness Kit    Self-Care for Depression  Heres the deal. Your body and mind are really not as separate as most people think.  What you do and think affects how you feel and how you feel influences what you do and think. This means if you do things that people who feel good do, it will help you feel better.  Sometimes this is all it takes.  There is also a place for medication and therapy depending on how severe your depression is, so be sure to consult with your medical provider and/ or Behavioral Health Consultant if your symptoms are worsening or not improving.     In order to better manage my stress, I will:    Exercise  Get some form of exercise, every day. This will help reduce pain and release endorphins, the feel good chemicals in your brain. This is almost as good as taking antidepressants!  This is not the same as joining a gym and then never going! (they count on that by the way?) It can be as simple as just going for a walk or doing some gardening, anything that will get you moving.      Hygiene   Maintain good hygiene (get out of bed in the morning, make your bed, brush your teeth, take a shower, and get dressed like you were going to work, even if you are unemployed).  If your clothes don't fit try to get ones that do.    Diet  Strive to eat foods that are good for me, drink plenty of water, and avoid excessive sugar, caffeine, alcohol, and other mood-altering substances.  Some foods that are helpful in depression are: complex carbohydrates, B vitamins, flaxseed, fish or fish oil, fresh  fruits and vegetables.    Psychotherapy  Agree to participate in Individual Therapy (if recommended).    Medication  If prescribed medications, I agree to take them.  Missing doses can result in serious side effects.  I understand that drinking alcohol, or other illicit drug use, may cause potential side effects.  I will not stop my medication abruptly without first discussing it with my provider.    Staying Connected With Others  Stay in touch with my friends, family members, and my primary care provider/team.    Use your imagination  Be creative.  We all have a creative side; it doesnt matter if its oil painting, sand castles, or mud pies! This will also kick up the endorphins.    Witness Beauty  (AKA stop and smell the roses) Take a look outside, even in mid-winter. Notice colors, textures. Watch the squirrels and birds.     Service to others  Be of service to others.  There is always someone else in need.  By helping others we can get out of ourselves and remember the really important things.  This also provides opportunities for practicing all the other parts of the program.    Humor  Laugh and be silly!  Adjust your TV habits for less news and crime-drama and more comedy.    Control your stress  Try breathing deep, massage therapy, biofeedback, and meditation. Find time to relax each day.     Crisis Text Line  http://www.crisistextline.org    The Crisis Text Line serves anyone, in any type of crisis, providing access to free, 24/7 support and information via the medium people already use and trust:    Here's how it works:  1.  Text 958-462 from anywhere in the [place], anytime, about any type of crisis.  2.  A live, trained Crisis Counselor receives the text and responds quickly.  3.  The volunteer Crisis Counselor will help you move from a 'hot moment to a cool moment'.  My support system    Clinic Contact:  Phone number:    Contact 1:  Phone number:    Contact 2:  Phone number:    Mandae[employer]/:   Phone number:    Therapist:  Phone number:    [facility] crisis center:    Phone number:    Other community support:  Phone number:

## 2021-06-21 NOTE — PROGRESS NOTES
ASSESSMENT AND PLAN:     Problem List Items Addressed This Visit        High    Hepatic steatosis     Labs from 10/2018 reviewed and despite the ultrasound that showed hepatic steatosis from 2016, the lfts remain normal. Will continue to monitor and encourage healthy lifestyle.            Unprioritized    Preventative health care     Labs reviewed today: 10/2018 d 33, tsh nl, cmp nl, tot chol 249, tg 64, hdl 79, ld 157. cbc ess. nl.         Dyslipidemia     Lipids reviewed. Statin decision aid discussed. She could reduce her risk from 3% in 10 years to 2% in 10 years with high dose statin. At this point will watch and wait.         Chronic right-sided thoracic back pain     Persisting over a month. Started when she bent in her car to pick something up.  Recommend physical therapy, ibuprofen, acetaminophen, and will rx flexeril to be used at night to help with sleep.          Relevant Medications    cyclobenzaprine (FLEXERIL) 5 MG tablet    HYDROcodone-acetaminophen 5-325 mg per tablet    Other Relevant Orders    Ambulatory referral to PT/OT    RESOLVED: Hyperchloremia     Normalized.            Other Visit Diagnoses     Need for shingles vaccine    -  Primary    Relevant Orders    Varicella Zoster, Recombinant Vaccine IM           Chief Complaint   Patient presents with     Back Pain     Pulled a muscle in her back originally about a month ago.  Was able to improve some with rest, but injured it again over the weekend.  Worse again.         HPI  Awilda Pandya is a 63 y.o. female comes in because she has had right back pain for the past 1 month.  She remembers leaning over towards the right to pick something up off the floor of her car when she was sitting in the  seat.  After that she is continued to have us tightness and burning sensation in her right mid back.  She had some leftover Vicodin from her wrist pain and did use that.  She felt better and actually thought that things were going to completely  resolve.  Unfortunately, about 2 weeks ago she did something that aggravated her pain and now it is still there.  She feels like it is mending but it is taking a long time.  She has been taking Advil, acetaminophen and uses gabapentin and prednisone for her chronic pain as well.  She says the pain is tolerable but she would like to get rid of it altogether if possible.  She feels like she is out of shape and that has caused this problem.    Social History     Tobacco Use   Smoking Status Never Smoker   Smokeless Tobacco Never Used      Current Outpatient Medications on File Prior to Visit   Medication Sig Dispense Refill     cetirizine (ZYRTEC) 10 MG tablet Take 10 mg by mouth daily.       gabapentin (NEURONTIN) 300 MG capsule Take 300 mg by mouth. 7 pills per day       metFORMIN (GLUCOPHAGE XR) 500 MG 24 hr tablet Take 1 tablet (500 mg total) by mouth daily with breakfast. 90 tablet 0     multivitamin (MULTIPLE VITAMIN ESSENTIAL) per tablet Take 1 tablet by mouth daily.       OMEGA-3/DHA/EPA/FISH OIL (FISH OIL-OMEGA-3 FATTY ACIDS) 300-1,000 mg capsule Take 1 g by mouth 2 (two) times a day.       predniSONE (DELTASONE) 10 mg tablet Take 7.5 mg by mouth daily .  1     No current facility-administered medications on file prior to visit.       Allergies   Allergen Reactions     Gluten        Review of Systems   All other systems reviewed and are negative.       OBJECTIVE: /86   Pulse 64   Resp 14   LMP  (LMP Unknown)    Physical Exam   Constitutional: She is oriented to person, place, and time. She appears well-developed and well-nourished. No distress.   HENT:   Head: Normocephalic and atraumatic.   Eyes: Conjunctivae are normal.   Neck: Neck supple.   Cardiovascular: Normal rate and regular rhythm.   Pulmonary/Chest: Effort normal.   Musculoskeletal: Normal range of motion.        Right shoulder: She exhibits tenderness (Soft tissue tenderness is noted over the right thoracic muscles.  There does not seem to  be any point or bony tenderness.  The skin itself is not tender and there is no rash.), pain and spasm. She exhibits normal range of motion, no bony tenderness, no swelling, no effusion, no crepitus, no deformity, no laceration, normal pulse and normal strength.   Neurological: She is alert and oriented to person, place, and time.   Skin: Skin is warm and dry.   Psychiatric: She has a normal mood and affect.        Vitamin D, Total (25-Hydroxy)   Date Value Ref Range Status   10/30/2018 33.0 30.0 - 80.0 ng/mL Final     Lab Results   Component Value Date    TSH 0.67 10/30/2018      Results for orders placed or performed in visit on 10/30/18   Comprehensive Metabolic Panel   Result Value Ref Range    Sodium 141 136 - 145 mmol/L    Potassium 4.8 3.5 - 5.0 mmol/L    Chloride 107 98 - 107 mmol/L    CO2 22 22 - 31 mmol/L    Anion Gap, Calculation 12 5 - 18 mmol/L    Glucose 117 70 - 125 mg/dL    BUN 18 8 - 22 mg/dL    Creatinine 0.67 0.60 - 1.10 mg/dL    GFR MDRD Af Amer >60 >60 mL/min/1.73m2    GFR MDRD Non Af Amer >60 >60 mL/min/1.73m2    Bilirubin, Total 0.3 0.0 - 1.0 mg/dL    Calcium 10.0 8.5 - 10.5 mg/dL    Protein, Total 7.3 6.0 - 8.0 g/dL    Albumin 4.0 3.5 - 5.0 g/dL    Alkaline Phosphatase 71 45 - 120 U/L    AST 15 0 - 40 U/L    ALT <9 0 - 45 U/L      Lab Results   Component Value Date    CHOL 249 (H) 10/30/2018    CHOL 204 (H) 06/20/2017     Lab Results   Component Value Date    HDL 79 10/30/2018    HDL 58 06/20/2017     Lab Results   Component Value Date    LDLCALC 157 (H) 10/30/2018    LDLCALC 128 06/20/2017     Lab Results   Component Value Date    TRIG 64 10/30/2018    TRIG 92 06/20/2017     No components found for: CHOLHDL     Lab Results   Component Value Date    WBC 7.6 10/30/2018    HGB 13.7 10/30/2018    HCT 41.7 10/30/2018    MCV 89 10/30/2018     10/30/2018        This note was created using Dragon dictation.  Please excuse any grammatical errors.

## 2021-06-21 NOTE — PROGRESS NOTES
FEMALE PREVENTATIVE EXAM  Overweight/weight gain was addressed above and beyond the scope of the normal annual exam and metformin was prescribed.    Assessment and Plan:       Problem List Items Addressed This Visit        High    BMI 27.0-27.9,adult     See abnormal weight gain in problem list.          Hepatic steatosis     On chart review today I do see an ultrasound that reveals hepatic steatosis.  I have recommended that she increase vegetable intake and decrease sugar and processed food intake to try to improve this.  In addition because she has been gaining weight on gabapentin, prednisone and as needed Zyrtec I have recommended that she use some off label metformin to baldemar to the weight gain with these drugs.            Unprioritized    Hyperchloremia     We will recheck today.         H/O colonoscopy     Colonoscopy done in August 2016 and was normal.  It was recommended that it be repeated in 5 years-August 2021.         Preventative health care     Flu shot -recommended  Pap: -She had a hysterectomy due to uterine prolapse so no Pap smear is needed peer  Mammo: She had a normal mammogram in August 2018.  She sees Dr. Baker for all her gynecological and breast needs.  Colonoscopy: Colonoscopy is due in August 2021.  Std testing desired: Declined-offered  Osteoporosis prevention discussed.  vitamin d levels ordered. Recommend daily calcium and vitamin d intake to keep good bone health. Recommend weight bearing exercise, no tobacco, and limit alcohol  dexa -see osteopenia in the problem list  Recommend sunscreen, exercise, & healthy diet.  Offered tsh, glucose, hgb, lipid  I have had an Advance Directives discussion with the patient.   Body mass index is 27.12 kg/(m^2).   mychart offered.          Osteopenia     Osteopenia was noted on the DEXA scan done in July/11/2017.  It is recommended that we repeat this in 7/2019 and we discussed checking a vitamin D level today to see where it is and she would like  to do that and is willing to pay if insurance does not cover it.         Abnormal weight gain - Primary     Weight gain likely due to concurrent use of gabapentin and prednisone for left wrist pain.  I have suggested that we use some metformin off label to help baldemar this weight gain.  She is agreeable to this.  Metformin 500 mg extended release is sent to her pharmacy today.  Renal panel will be done to ensure that her kidneys can tolerate this.  I have recommended that we continue this for 90 days and then recheck labs if she is still on these medications or discontinue the metformin once she discontinues the gabapentin and the prednisone.         Relevant Medications    metFORMIN (GLUCOPHAGE XR) 500 MG 24 hr tablet    Other Relevant Orders    Thyroid Stimulating Hormone (TSH)    Left wrist pain     3/6/2018 recd notes from ortho re: orif left wrist.add to surg. more notes from ortho said she has left arm pain/ wrist pain complex and they are trying a injection for nerve block. CR 9/17 - post left ulnar styloid removal fu note taking gabapentin, warm water soaks, and today she tells me she is also taking prednisone until at least February.    With the gabapentin and the prednisone she is been gaining weight.         Allergic rhinitis, unspecified seasonality, unspecified trigger     She uses Zyrtec as needed.  Currently she is taking prednisone and so she has not been using her Zyrtec because the prednisone works even better than the Zyrtec.  We talked about the fact that Zyrtec is weight positive and that she should use it sparingly.  An alternative weight neutral medication would be a saline nasal rinse that may be adequate for her symptoms.  She will keep this in mind.           Other Visit Diagnoses     Screening for endocrine, nutritional, metabolic and immunity disorder        Relevant Orders    Vitamin D, Total (25-Hydroxy)    Screening for metabolic disorder        Relevant Orders    Comprehensive  Metabolic Panel    Screening, anemia, deficiency, iron        Relevant Orders    HM1(CBC and Differential) (Completed)    HM1 (CBC with Diff) (Completed)    Screening, lipid        Relevant Orders    Lipid [place]    Immunization due        Relevant Orders    Influenza, Recombinant, Inj, Quadrivalent, PF, 18+YRS (Completed)            Next follow up:  No Follow-up on file.    Immunization Review  Adult Imm Review: Missing doses of influenza  History   Smoking Status     Never Smoker   Smokeless Tobacco     Never Used     I discussed the following with the patient:   Adult Healthy Living: Importance of regular exercise  Healthy nutrition  Getting adequate sleep    I have had an Advance Directives discussion with the patient.    Subjective:   Chief Complaint: Awilda Pandya is an 63 y.o. female here for a preventative health visit.     HPI: Comes in today concerned about her weight and would like to get an annual exam.  She says she is been gaining weight.  She is been forced to take gabapentin and prednisone due to left wrist pain that is complex.  Injections have not been helpful.    Healthy Habits  Are you taking a daily aspirin? No  Do you typically exercising at least 40 min, 3-4 times per week?  NO  Do you usually eat at least 4 servings of fruit and vegetables a day, include whole grains and fiber and avoid regularly eating high fat foods? Yes  Have you had an eye exam in the past two years? Yes  Do you see a dentist twice per year? Yes  Do you have any concerns regarding sleep? No    Safety Screen  If you own firearms, are they secured in a locked gun cabinet or with trigger locks? Yes  Do you feel you are safe where you are living?: Yes (10/30/2018 10:47 AM)  Do you feel you are safe in your relationship(s)?: Yes (10/30/2018 10:47 AM)    Review of Systems:  Please see above.  The rest of the review of systems are negative for all systems.     Pap History:   Yes - updated in Problem List and Health Maintenance  "accordingly  Cancer Screening       Status Date      PAP SMEAR Overdue 3/3/1987     COLONOSCOPY Postponed 12/30/2018 Originally 3/3/2007. Waiting for Documentation/Doc Correction    MAMMOGRAM Next Due 9/22/2020      Done 9/22/2018 MAMMO SCREENING BILATERAL (A)     Patient has more history with this topic...          Patient Care Team:  Carol Dickinson MD as PCP - General (Family Medicine)  Susu Baker MD as Physician (Obstetrics and Gynecology)        History     Reviewed By Date/Time Sections Reviewed    Brittney Villeda [facility] 10/30/2018 10:48 AM Tobacco, Obstetric    Brittney Villeda [facility] 10/30/2018 10:47 AM Tobacco, Alcohol, Drug Use, Sexual Activity            Objective:   Vital Signs:   Visit Vitals     /78     Pulse 84     Resp 16     Ht 5' 5\" (1.651 m)     Wt 163 lb (73.9 kg)     LMP  (LMP Unknown)     Breastfeeding No     BMI 27.12 kg/m2          PHYSICAL EXAM  Physical Exam   Constitutional: She is oriented to person, place, and time. She appears well-developed and well-nourished. No distress.   HENT:   Head: Normocephalic and atraumatic.   Right Ear: External ear normal.   Left Ear: External ear normal.   Nose: Nose normal.   Mouth/Throat: Oropharynx is clear and moist.   Eyes: Conjunctivae are normal.   Neck: Neck supple. No thyromegaly present.   Cardiovascular: Normal rate, regular rhythm and normal heart sounds.    Pulmonary/Chest: Effort normal and breath sounds normal.   Declined, will be seeing Dr. Baker later today and has an expectation that she will do breast exam.   Abdominal: Soft. Bowel sounds are normal.   Genitourinary:   Genitourinary Comments: Declined-has an appointment with gynecology later today.   Musculoskeletal: Normal range of motion.   Neurological: She is alert and oriented to person, place, and time.   Skin: Skin is warm and dry.   Psychiatric: She has a normal mood and affect.     Orders Placed This Encounter   Procedures     Influenza, Recombinant, Inj, " Quadrivalent, PF, 18+YRS     Vitamin D, Total (25-Hydroxy)     Comprehensive Metabolic Panel     Lipid [place]     Thyroid Stimulating Hormone (TSH)     HM1 (CBC with Diff)         The 10-year ASCVD risk score (Booker ANISA Jr, et al., 2013) is: 3.5%    Values used to calculate the score:      Age: 63 years      Sex: Female      Is Non- : No      Diabetic: No      Tobacco smoker: No      Systolic Blood Pressure: 126 mmHg      Is BP treated: No      HDL Cholesterol: 58 mg/dL      Total Cholesterol: 204 mg/dL         Medication List          These changes are accurate as of 10/30/18 12:31 PM.  If you have any questions, ask your nurse or doctor.               START taking these medications          metFORMIN 500 MG 24 hr tablet   Also known as:  GLUCOPHAGE XR   INSTRUCTIONS:  Take 1 tablet (500 mg total) by mouth daily with breakfast.   Started by:  Carol Dickinson MD             CONTINUE taking these medications          cetirizine 10 MG tablet   Also known as:  ZyrTEC   INSTRUCTIONS:  Take 10 mg by mouth daily.           fish oil-omega-3 fatty acids 300-1,000 mg capsule   INSTRUCTIONS:  Take 1 g by mouth 2 (two) times a day.           gabapentin 300 MG capsule   Also known as:  NEURONTIN   INSTRUCTIONS:  Take 300 mg by mouth. 7 pills per day           MULTIPLE VITAMIN ESSENTIAL per tablet   INSTRUCTIONS:  Take 1 tablet by mouth daily.   Generic drug:  multivitamin           predniSONE 10 mg tablet   Also known as:  DELTASONE   INSTRUCTIONS:  Take 10 mg by mouth daily.                Where to Get Your Medications      These medications were sent to [place] [id number] [place], [place] - [address] [facility]  [address] [facility] [address]     Phone:  [phone]      metFORMIN 500 MG 24 hr tablet             Additional Screenings Completed Today:

## 2021-06-22 NOTE — PROGRESS NOTES
2/4/19 - Pt has not contacted PT for any further intervention needed per evaluation POC x 60 days. Will D/C this episode of care with pt I with HEP. Thank you for this referral.  Katelyn Kessler PT, DPT, OCS, CLT    [facility]   Initial Evaluation    Patient Name: Awilda Pandya  Date of evaluation: 12/7/2018  Visit number: 1/6  Referring Provider: Carol Dickinson MD  Referring Diagnosis: Chronic R sided thoracic back pain  Visit Diagnosis:     ICD-10-CM    1. Acute right-sided thoracic back pain M54.6    2. Generalized muscle weakness M62.81    3. Decreased range of motion of trunk and back M25.60        Assessment:      Awilda Pandya is a 63 y.o. female who presents to therapy today with chief complaints of R sided thoracic back pain. Onset date of sx was 10/30/18 when pt leaned over her arm rest in the car to reach for the passenger floor and felt something cramp in her R back.  Pt reported h/o PMHx significant for L hand CRPS that she has done rehab for this past year.  Pain symptoms are mild to significant but improving some.  Functional impairments include difficulty with reaching sideways to the left, changing sleeping positions and lifting.  Pt demo's signs and sx consistent with R sided trunk muscular and jt strain with decreased ROM and trunk strength.     Pt. is appropriate for skilled PT intervention as outlined in the Plan of Care (POC).  Pt. is a good candidate for skilled PT services to improve pain levels and function.    Goals:  Pt. will demonstrate/verbalize independence in self-management of condition in : 12 weeks  Pt. will be independent with home exercise program in : 12 weeks;Comment  Comment:: for trunk and core strengthening and ROM to improve condition of body and decrease pain sx.    Pt will: be able to sleep without disturbance due to trunk pain in 12 weeks for improved quality of sleep.      Patient's expectations/goals are realistic.    Barriers to Learning or Achieving  Goals:  No Barriers.       Plan / Patient Instructions:      Plan for next visit: Pt to attempt HEP for trunk and core strengthening and stretching. Pt to contact PT if needing further instruction with advancing or modifying HEP.    Plan of Care:   Communication with: Referral Source  Patient Related Instruction: Nature of Condition;Treatment plan and rationale;Self Care instruction;Basis of treatment;Body mechanics;Posture;Precautions;Next steps;Expected outcome  Times per Week: 1x/every 2-4 weeks  Number of Weeks: 12  Number of Visits: 6  Discharge Planning: when indicated  Precautions / Restrictions : none  Therapeutic Exercise: ROM;Stretching;Strengthening  Neuromuscular Reeducation: posture;TNE;core;other  Neuromuscular Re-education: neurodynamics  Manual Therapy: soft tissue mobilization;joint mobilization;muscle energy  Functional Training (ADL's): self care;ADL's    Treatment techniques, plan of care, and goals were discussed with the patient.  The patient agrees to the plan as outlined.  The plan of care is dynamic and will be modified on an ongoing basis.       Subjective:       Social information:   Occupation:retired   Work Status:NA    History of Present Illness:  Pt reports reaching over her arm rest in her 's seat in the car to get her water bottle cap that had fallen on the floor and she quickly moved to get it and felt something cramp in her R waist/back and pain was significant for a couple of weeks. Pain is feeling like it is getting better but pt reports she can still get some discomfort with sleeping and changing sleeping positions and reaching sideways - especially quickly.    Pt reports h/o CRPS in L wrist so pt has been taking prednisone for almost a year    Pain Ratin  Pain rating at best: 0  Pain rating at worst: 8  Pain description: aching    Patient reports benefit from:  heat       Objective:      Patient Outcome Measures :    Modified Oswestry Low Back Pain Disablity  Questionnaire  in %: 4     Scores range from 0-100%, where a score of 0% represents minimal pain and maximal function. The minimal clinically important difference is a score reduction of 12%.    Precautions/Restrictions: None  Involved side: Right  Posture Observation:      General sitting posture is  fair.  Gait: WNL     Palpation: Tender R QL and palpation of R rib end 10-12   Good mobility R inferior ribs and facet jt thoracolumbar    ROM: Trunk ROM WNL except L SB min limited with tightness on R side    Strength: B UE/LE grossly 5/5  Pt had significant difficulty with abiola TA without back pain.  Mod weakness with trunk extensors without pain    Sensation: Intact to light touch back and B LE    Special tests: Negative quadrant testing for facet    Treatment Today      TREATMENT MINUTES COMMENTS   Evaluation 24 R trunk   Self-care/ Home management     Manual therapy     Neuromuscular Re-education NC Discussed CRPS and treatments.   Therapeutic Activity     Therapeutic Exercises 24 Discussed eval findings and POC. Education on anatomy and function with core and trunk. HEP instruction per Patient Instructions with written handout given.    Gait training     Modality__________________                Total 48    Blank areas are intentional and mean the treatment did not include these items.        PT Evaluation Code: (Please list factors)  Patient History/Comorbidities: L CRPS  Examination: R trunk   Clinical Presentation: stable  Clinical Decision Making: low    Patient History/  Comorbidities Examination  (body structures and functions, activity limitations, and/or participation restrictions) Clinical Presentation Clinical Decision Making (Complexity)   No documented Comorbidities or personal factors 1-2 Elements Stable and/or uncomplicated Low   1-2 documented comorbidities or personal factor 3 Elements Evolving clinical presentation with changing characteristics Moderate   3-4 documented comorbidities or  personal factors 4 or more Unstable and unpredictable High       Katelyn  Teesander PT, DPT, OCS, CLT  12/7/2018  10:58 AM

## 2021-06-23 NOTE — TELEPHONE ENCOUNTER
RN cannot approve Refill Request    RN can NOT refill this medication Protocol failed and NO refill given.       Tiffany Luz, [facility] Triage/Med Refill 1/28/2019    Requested Prescriptions   Pending Prescriptions Disp Refills     metFORMIN (GLUCOPHAGE-XR) 500 MG 24 hr tablet [Pharmacy Med Name: METFORMIN  MG TABLET] 90 tablet 0     Sig: TAKE 1 TABLET BY MOUTH EVERY DAY WITH BREAKFAST    Metformin Refill Protocol Failed - 1/26/2019 12:36 AM       Failed - A1C in last 6 months    No results found for: HGBA1C           Failed - Microalbumin in last year     No results found for: MICROALBUR              Passed - Blood pressure in last 12 months    BP Readings from Last 1 Encounters:   11/27/18 134/86            Passed - LFT or AST or ALT in last 12 months    Albumin   Date Value Ref Range Status   10/30/2018 4.0 3.5 - 5.0 g/dL Final     Bilirubin, Total   Date Value Ref Range Status   10/30/2018 0.3 0.0 - 1.0 mg/dL Final     Alkaline Phosphatase   Date Value Ref Range Status   10/30/2018 71 45 - 120 U/L Final     AST   Date Value Ref Range Status   10/30/2018 15 0 - 40 U/L Final     ALT   Date Value Ref Range Status   10/30/2018 <9 0 - 45 U/L Final     Protein, Total   Date Value Ref Range Status   10/30/2018 7.3 6.0 - 8.0 g/dL Final               Passed - GFR or Serum Creatinine in last 6 months    GFR MDRD Non Af Amer   Date Value Ref Range Status   10/30/2018 >60 >60 mL/min/1.73m2 Final     GFR MDRD Af Amer   Date Value Ref Range Status   10/30/2018 >60 >60 mL/min/1.73m2 Final            Passed - Visit with PCP or prescribing provider visit in last 6 months or next 3 months    Last office visit with prescriber/PCP: 11/27/2018 OR same dept: 11/27/2018 Carol Dickinson MD OR same specialty: 11/27/2018 Carol Dickinson MD Last physical: 10/30/2018 Last MTM visit: Visit date not found         Next appt within 3 mo: Visit date not found  Next physical within 3 mo: Visit date not found  Prescriber OR  PCP: Carol Dickinson MD  Last diagnosis associated with med order: 1. Abnormal weight gain  - metFORMIN (GLUCOPHAGE-XR) 500 MG 24 hr tablet [Pharmacy Med Name: METFORMIN  MG TABLET]; TAKE 1 TABLET BY MOUTH EVERY DAY WITH BREAKFAST  Dispense: 90 tablet; Refill: 0     If protocol passes may refill for 12 months if within 3 months of last provider visit (or a total of 15 months).

## 2021-06-23 NOTE — TELEPHONE ENCOUNTER
meformin is being used off label to baldemar weight gain while using gabapentin and prednisone. She is due for a 90 day follow up to ensure no side effects and that continued use is warranted, since this is being used off label I cannot refill until I see her.

## 2021-06-23 NOTE — TELEPHONE ENCOUNTER
Pt states she is not going to take this anymore now that she is done with the prednisone.   Please deny and close encounter

## 2021-06-25 ENCOUNTER — AMBULATORY - HEALTHEAST (OUTPATIENT)
Dept: SCHEDULING | Facility: CLINIC | Age: 66
End: 2021-06-25
Payer: OTHER GOVERNMENT

## 2021-06-25 DIAGNOSIS — M81.0 SENILE OSTEOPOROSIS: ICD-10-CM

## 2021-06-25 NOTE — TELEPHONE ENCOUNTER
Refill Approved    Rx renewed per Medication Renewal Policy. Medication was last renewed on 5/18/21.    Franko Mckeon, [facility] Triage/Med Refill 6/14/2021     Requested Prescriptions   Pending Prescriptions Disp Refills     sertraline (ZOLOFT) 50 MG tablet [Pharmacy Med Name: SERTRALINE HCL 50 MG TABLET] 30 tablet 0     Sig: TAKE 1 TABLET BY MOUTH EVERY DAY       [facility] Refill Protocol  Passed - 6/13/2021 10:31 AM        Passed - PCP or prescribing provider visit in last year     Last office visit with prescriber/PCP: 5/18/2021 Carol Dickinson MD OR same dept: 5/18/2021 Carol Dickinson MD OR same specialty: 5/18/2021 Carol Dickinson MD  Last physical: 11/26/2019 Last MTM visit: Visit date not found   Next visit within 3 mo: Visit date not found  Next physical within 3 mo: Visit date not found  Prescriber OR PCP: Carol Dickinson MD  Last diagnosis associated with med order: 1. Anxiety state  - sertraline (ZOLOFT) 50 MG tablet [Pharmacy Med Name: SERTRALINE HCL 50 MG TABLET]; TAKE 1 TABLET BY MOUTH EVERY DAY  Dispense: 30 tablet; Refill: 0  - sertraline (ZOLOFT) 100 MG tablet [Pharmacy Med Name: SERTRALINE  MG TABLET]; TAKE 1 TABLET BY MOUTH EVERY DAY  Dispense: 30 tablet; Refill: 0    If protocol passes may refill for 12 months if within 3 months of last provider visit (or a total of 15 months).                sertraline (ZOLOFT) 100 MG tablet [Pharmacy Med Name: SERTRALINE  MG TABLET] 30 tablet 0     Sig: TAKE 1 TABLET BY MOUTH EVERY DAY       [facility] Refill Protocol  Passed - 6/13/2021 10:31 AM        Passed - PCP or prescribing provider visit in last year     Last office visit with prescriber/PCP: 5/18/2021 Carol Dickinson MD OR same dept: 5/18/2021 Carol Dickinson MD OR same specialty: 5/18/2021 Carol Dickinson MD  Last physical: 11/26/2019 Last MTM visit: Visit date not found   Next visit within 3 mo: Visit date not found  Next physical within 3 mo: Visit date not  found  Prescriber OR PCP: Carol Dickinson MD  Last diagnosis associated with med order: 1. Anxiety state  - sertraline (ZOLOFT) 50 MG tablet [Pharmacy Med Name: SERTRALINE HCL 50 MG TABLET]; TAKE 1 TABLET BY MOUTH EVERY DAY  Dispense: 30 tablet; Refill: 0  - sertraline (ZOLOFT) 100 MG tablet [Pharmacy Med Name: SERTRALINE  MG TABLET]; TAKE 1 TABLET BY MOUTH EVERY DAY  Dispense: 30 tablet; Refill: 0    If protocol passes may refill for 12 months if within 3 months of last provider visit (or a total of 15 months).

## 2021-06-25 NOTE — PROGRESS NOTES
"Progress Notes by Carol Dickinson MD at 6/20/2017  9:00 AM     Author: Carol Dickinson MD Service: -- Author Type: Physician    Filed: 7/20/2017 11:59 PM Encounter Date: 6/20/2017 Status: Addendum    : Carol Dickinson MD (Physician)    Related Notes: Original Note by Carol Dickinson MD (Physician) filed at 6/20/2017  9:59 AM       Assessment:      Healthy female exam.    Shingles was addressed above and beyond the annual exam today.     Plan:      Problem List Items Addressed This Visit     Hyperchloremia - Primary     Check cmp today.   Addendum: see mychart message;   \"Your chloride levels went up from 108-112.  I have included an article on chloride rich foods today to see if you can modify her diet and improve this.  I suspect the other abnormalities will also self-correct if your chloride level returns to normal.  I would like to recheck your chloride level in 1-2 weeks.\"         Relevant Orders    Basic Metabolic Panel (Completed)    H/O colonoscopy     She reports colonoscopy done Aug 2016 and states that she needs to get a colonoscopy every 5 years.         Preventative health care     Pap: several years ago, results were normal.   Mammo: done yesterday, result pending  Colonoscopy: q 5 yr. Done aug 2016   Std testing desired:  offered  Osteoporosis prevention discussed.  vitamin d levels ordered. Recommend daily calcium and vitamin d intake to keep good bone health. Recommend weight bearing exercise, no tobacco, and limit alcohol  dexa recommended as none on file.   Recommend sunscreen, exercise, & healthy diet.  Offered tsh, glucose, hgb, lipid  I have had an Advance Directives discussion with the patient.   Body mass index is 26.79 kg/(m^2).   mychart offered.            BMI 26.0-26.9,adult     Stable. Discussed. Recommend strength training.         Menopause    Relevant Orders    DXA Bone Density Scan (Completed)    S/P hysterectomy due to prolapse (cervix removed)     Due to " prolapse, benign          Lipid screening     Increased physical activity is known to improve hdl cholesterol.          Osteopenia     Patient Profile:  62 y.o. female, postmenopausal, is here for the first bone density test.   History of fractures - Yes;  [place]. Family history of osteoporosis - Yes;  mother and grandparent.  Family history of hip fracture: None. Smoking history - No. Osteoporosis treatment past -  Yes;  HRT. Osteoporosis treatment current - No.  Chronic medical problems - Hysterectomy. High risk medications -  Steroids;  Yes, in the Past.        Assessment:     1. The spine bone density L1-L4 with T-score -1.5.  2. Femoral bone densities show left femoral neck T- score -1.6 and right total hip T-score -1.0.  3. Trabecular bone score indicates moderate trabecular bone architecture.        62 y.o. female with LOW BONE DENSITY (OSTEOPENIA) and the ten year probability of major osteoporotic fracture 14.4 % and hip fracture risk 1.4 %.  This is considered a moderate risk profile.           Recommendations:  Appropriate calcium, vitamin D supplements, along with balance and weight bearing exercise is recommended with follow up bone density scan in 2 years.              Other Visit Diagnoses     Screening, anemia, deficiency, iron        Relevant Orders    HM1(CBC and Differential) (Completed)    HM1 (CBC with Diff) (Completed)    Screening, lipid        Relevant Orders    Lipid [place] (Completed)    Screening for thyroid disorder        Relevant Orders    Thyroid [place] (Completed)    Screening for endocrine, metabolic and immunity disorder        Relevant Orders    Vitamin D, Total (25-Hydroxy) (Completed)    Screening for malignant neoplasm of cervix        Shingles               I have had an Advance Directives discussion with the patient.     Shingles  Suspected based on paresthesias in a dermatomal region of her right lower back. No rash present yet. Will tx with antivirals.     Subjective:       Awilda Pandya is a 62 y.o. female who presents for an annual exam. The patient is sexually active. The patient participates in regular exercise: no. The patient reports that there is not domestic violence in her life.     She is having some skin discomfort on her right lower back and S drip were begun pulling a dermatome.  She says this is been present for 1-2 days and feels very sensitive when she touches it lightly.  There is nothing on the skin and no rash or other sign of anything wrong.    Healthy Habits:   Regular Exercise: No  Sunscreen Use: Yes  Healthy Diet: Yes and No  Dental Visits Regularly: Yes  Seat Belt: Yes  Sexually active: Yes  Self Breast Exam Monthly:No  Hemoccults: No  Flex Sig: No  Colonoscopy: Yes last August. Has to repeat every 5 years.   Lipid Profile: Yes  Glucose Screen: Yes  Prevention of Osteoporosis: Yes  Last Dexa: Yes  Guns at Home:  Yes  Guns Safety Locks:  Yes and No        There is no immunization history on file for this patient.  Immunization status: up to date and documented.    No exam data present    Gynecologic History  No LMP recorded (lmp unknown). Patient has had a hysterectomy.  Contraception: status post hysterectomy  Last Pap: Several years ago. Results were: always had been normal.   Last mammogram: Yesterday.       OB History    Para Term  AB Living   3 3 3   3   SAB TAB Ectopic Multiple Live Births             # Outcome Date GA Lbr Akbar/2nd Weight Sex Delivery Anes PTL Lv   3 Term            2 Term            1 Term                   Current Outpatient Prescriptions   Medication Sig Dispense Refill   ? cetirizine (ZYRTEC) 10 MG tablet Take 10 mg by mouth daily.     ? multivitamin (MULTIPLE VITAMIN ESSENTIAL) per tablet Take 1 tablet by mouth daily.     ? OMEGA-3/DHA/EPA/FISH OIL (FISH OIL-OMEGA-3 FATTY ACIDS) 300-1,000 mg capsule Take 1 g by mouth 2 (two) times a day.       No current facility-administered medications for this visit.      Past Medical  "History:   Diagnosis Date   ? Basal cell carcinoma    ? Uterovaginal prolapse      Past Surgical History:   Procedure Laterality Date   ? COLPORRHAPHY N/A 1/19/2016    Procedure: UTEROSACRAL LIGAMENT SUSPENSION, CYSTOCELE, CYSTOSCOPY;  Surgeon: Susu Baker MD;  Location: [facility];  Service:    ? HYSTERECTOMY  01/2016   ? MOUTH SURGERY     ? WI VAGINAL HYSTERECTOMY,UTERUS 250 GMS/< N/A 1/19/2016    Procedure: VAGINAL HYSTERECTOMY;  Surgeon: Susu Baker MD;  Location: [facility];  Service: Gynecology   ? TUBAL LIGATION       Gluten  Family History   Problem Relation Age of Onset   ? Breast cancer Cousin 60     Social History     Social History   ? Marital status:      Spouse name: N/A   ? Number of children: N/A   ? Years of education: N/A     Occupational History   ? Not on file.     Social History Main Topics   ? Smoking status: Never Smoker   ? Smokeless tobacco: Never Used   ? Alcohol use Yes      Comment: rarely   ? Drug use: No   ? Sexual activity: Yes     Birth control/ protection: Surgical     Other Topics Concern   ? Not on file     Social History Narrative       Review of Systems   Review of Systems   Constitutional: Negative.    HENT: Negative.    Eyes: Negative.    Respiratory: Negative.    Cardiovascular: Negative.    Gastrointestinal: Negative.    Endocrine: Negative.    Genitourinary: Negative.    Musculoskeletal: Negative.    Skin: Negative.    Neurological: Negative.    Hematological: Negative.    Psychiatric/Behavioral: Negative.              Objective:         Vitals:    06/20/17 0853   BP: 138/86   Pulse: 84   Resp: 16   Weight: 161 lb (73 kg)   Height: 5' 5\" (1.651 m)     Body mass index is 26.79 kg/(m^2).    Physical   Physical Exam   Constitutional: She appears well-developed and well-nourished.   HENT:   Right Ear: External ear normal.   Left Ear: External ear normal.   Nose: Nose normal.   Mouth/Throat: Oropharynx is clear and moist.   Eyes: Conjunctivae and " EOM are normal. Pupils are equal, round, and reactive to light. Right eye exhibits no discharge. Left eye exhibits no discharge.   Neck: No thyromegaly present.   Cardiovascular: Normal rate, regular rhythm and normal heart sounds.    No murmur heard.  Pulmonary/Chest: Effort normal and breath sounds normal.   Breast exam declined today as this was done by Dr. Baker in the last week or so.   Abdominal: Soft. Bowel sounds are normal. She exhibits no distension and no mass. There is no tenderness. There is no rebound and no guarding.   Genitourinary:   Genitourinary Comments: Declined as this was done by Dr. Baker within the last week or so.   Musculoskeletal: Normal range of motion.   No joint swelling or deformity.   Lymphadenopathy:     She has no cervical adenopathy.   Neurological: She is alert. She has normal reflexes.   Skin: Skin is warm and dry. No rash noted.        Sensitive to light touch in area indicated on diagram. No rash seen.   Psychiatric: She has a normal mood and affect.

## 2021-06-26 NOTE — PROGRESS NOTES
ASSESSMENT AND PLAN:     Problem List Items Addressed This Visit        High    BMI 29.0-29.9,adult     Regaining weight. Weight reduction discussed.             Unprioritized    Anxiety state - Primary     At our last visit in May we discontinued effexor and restarted zoloft.  She is here for follow up today. She is hoping to have zoloft increased today although she does like it and feels it is working she wants to see if she likes a higher dose better.  Increase zoloft 150mg to 200 mg po q day.   Doing well but wans to see if she likes the 200 mg dose better.   Follow up in 1 month         Relevant Medications    sertraline (ZOLOFT) 100 MG tablet    Eating disorder     zoloft seems to help her bingeing.          Relevant Medications    sertraline (ZOLOFT) 100 MG tablet           Chief Complaint   Patient presents with     Med check        HPI  Awilda Pandya is a 66 y.o. female comes in for follow up of zoloft.     She notes she is feeling better.  Sleeping is easier.  She still has anxiety related to granddaughter who has cancer.     Social History     Tobacco Use   Smoking Status Never Smoker   Smokeless Tobacco Never Used      Current Outpatient Medications on File Prior to Visit   Medication Sig Dispense Refill     alendronate (FOSAMAX) 70 MG tablet TAKE 1 TABLET BY MOUTH EVERY 7 DAYS IN THE AM WITH FULL GLASS OF WATER ON EMPTY STOMACH 12 tablet 3     cetirizine (ZYRTEC) 10 MG tablet Take 10 mg by mouth daily.       estradiol (ESTRACE) 0.01 % (0.1 mg/gram) vaginal cream INSERT 2 GRAM BY VAGINAL ROUTE TWICE WEEKLY  0     hydrOXYzine pamoate (VISTARIL) 50 MG capsule TAKE 1 CAPSULE (50 MG TOTAL) BY MOUTH 4 (FOUR) TIMES A DAY AS NEEDED FOR ANXIETY. 90 capsule 10     metroNIDAZOLE (METROCREAM) 0.75 % cream APPLY TO FACE TWICE A DAY       multivitamin (MULTIPLE VITAMIN ESSENTIAL) per tablet Take 1 tablet by mouth 2 (two) times a day.              pramipexole (MIRAPEX) 0.25 MG tablet TAKE 1 TABLET BY MOUTH EVERYDAY AT  BEDTIME 90 tablet 1     traZODone (DESYREL) 50 MG tablet TAKE 1 TABLET BY MOUTH EVERYDAY AT BEDTIME 90 tablet 3     [DISCONTINUED] ketoconazole (NIZORAL) 2 % cream APPLY TO AFFECTED AREA IN GROIN AND UNDER BREAST TWICE DAILY AS NEEDED       [DISCONTINUED] sertraline (ZOLOFT) 100 MG tablet TAKE 1 TABLET BY MOUTH EVERY DAY 90 tablet 3     [DISCONTINUED] sertraline (ZOLOFT) 50 MG tablet TAKE 1 TABLET BY MOUTH EVERY DAY 90 tablet 3     No current facility-administered medications on file prior to visit.       Allergies   Allergen Reactions     Gluten      Wheat Flour          OBJECTIVE: /62 (Patient Site: Left Arm, Patient Position: Sitting, Cuff Size: Adult Regular)   Pulse 78   Wt 159 lb (72.1 kg)   LMP  (LMP Unknown)   SpO2 95%   BMI 29.08 kg/m     Physical Exam  Constitutional:       General: She is not in acute distress.     Appearance: She is well-developed.   HENT:      Head: Normocephalic and atraumatic.   Eyes:      Conjunctiva/sclera: Conjunctivae normal.   Neck:      Musculoskeletal: Neck supple.   Cardiovascular:      Rate and Rhythm: Normal rate and regular rhythm.   Pulmonary:      Effort: Pulmonary effort is normal.   Musculoskeletal: Normal range of motion.   Skin:     General: Skin is warm and dry.   Neurological:      Mental Status: She is alert and oriented to person, place, and time.            This note was created using Dragon dictation.  Please excuse any grammatical errors.

## 2021-06-27 ENCOUNTER — HEALTH MAINTENANCE LETTER (OUTPATIENT)
Age: 66
End: 2021-06-27

## 2021-06-28 NOTE — PROGRESS NOTES
Progress Notes by Carol Dickinson MD at 11/26/2019  2:20 PM     Author: Carol Dickinson MD Service: -- Author Type: Physician    Filed: 11/26/2019  8:22 PM Encounter Date: 11/26/2019 Status: Signed    : Carol Dickinson MD (Physician)       FEMALE PREVENTATIVE EXAM  New anxiety rx and pain in belly addressed above and beyond usual scope of annual exam today.   Assessment and Plan:       Problem List Items Addressed This Visit        High    Hepatic steatosis - Primary     Recheck lfts in setting of weight loss.          Relevant Orders    Comprehensive Metabolic Panel (Completed)    RESOLVED: BMI 25.0-25.9,adult     Normalized! Will resolve.             Unprioritized    H/O colonoscopy     Plan repeat 8/2021         Dyslipidemia     Discussed statin decision aid. Statin would lower her cvd risk from 4% to 2%, but she declined at this time, understanding that she could lower her risk with this medication.     Will recheck lipids and repeat calculation if she desires.          Relevant Orders    Lipid Cascade (Completed)    Allergic rhinitis, unspecified seasonality, unspecified trigger     Uses otc zyrtec daily         Restless legs syndrome     mirapex prn works, refilled. No side effectgs. Normal heart rate, no cns depression, no falls, no behavior changes, gets skin exam annually by derm.          Relevant Medications    pramipexole (MIRAPEX) 0.25 MG tablet    Senile osteoporosis     On fosamax through Dr. Mckeon.          History of basal cell carcinoma     Sees derm Dr. Kaminski - at [facility] annually.          Anxiety     NEW PROBLEM TODAY  Discussed counseling and medications. She wants to try medications first and will consider counseling prn.  Recommend 1 month trial of zoloft 50 mg po q day (start w 25 mg po q day for the first 8d then increase to full dose).     Follow up in 1 month.          Relevant Medications    sertraline (ZOLOFT) 50 MG tablet    Diverticulosis of large  intestine     Patients says she saw GI in the remote past and GI once long ago suspected diverticulitis clinically based on diverticulosis on colonoscopy.      Today she says she thinks she might have a flare of diverticulitis.  She has been doing clear liquids and her sx got better but then yesterday she ate and the pain in the lower abdomen returned. No fever and the pain is bilateral in the lower abdomen.     Ct of abdomen recommended. I did offer to order stat but she declined stat saying she didn't plan to go right away as she thought maybe her sx would spontaneously resolve with clear liquids. Since she is no acutely ill now, I cannot disagree, but she is asked to seek medical attention immediately if her sx worsen         RESOLVED: Left wrist pain     Not an active problem today           Other Visit Diagnoses     Screening for metabolic disorder        Screening, anemia, deficiency, iron        Relevant Orders    HM1(CBC and Differential) (Completed)    HM1 (CBC with Diff) (Completed)    Screening, lipid        Abdominal pain, left lower quadrant        Relevant Orders    CT Abdomen Pelvis Without Oral Without IV Contrast    Encounter for hepatitis C screening test for low risk patient        Relevant Orders    Hepatitis C Antibody (Anti-HCV)            Next follow up:  No follow-ups on file.    Immunization Review  Adult Imm Review: Missing doses of zoster vax - wants to check w insurance  Social History     Tobacco Use   Smoking Status Never Smoker   Smokeless Tobacco Never Used      I discussed the following with the patient:   Adult Healthy Living: Importance of regular exercise  Healthy nutrition    I have had an Advance Directives discussion with the patient.    Subjective:   Chief Complaint: Awilda Pandya is an 64 y.o. female here for a preventative health visit.     HPI: Tells me that she has been feeling anxious more than she thinks is appropriate lately.  She says she feels like she is just on edge.   She is wondering what could be done about this.  In addition    In addition she says she is been having some lower abdominal pain.  She had similar pain in the past and saw a gastroenterologist who suspect that she had diverticulitis.  This was because they had seen diverticulosis on colonoscopy in the past.  She says that in the past clear liquids have helped her symptoms to resolve on their own.  She tried this over the past few days and it really helped.  In fact her symptoms went away.  Yesterday she decided to eat because she felt so good but then her symptoms came back.    Healthy Habits  Are you taking a daily aspirin? No  Do you typically exercising at least 40 min, 3-4 times per week?  NO  Do you usually eat at least 4 servings of fruit and vegetables a day, include whole grains and fiber and avoid regularly eating high fat foods? NO  Have you had an eye exam in the past two years? Yes  Do you see a dentist twice per year? Yes  Do you have any concerns regarding sleep? No    Safety Screen  If you own firearms, are they secured in a locked gun cabinet or with trigger locks? Yes  Do you feel you are safe where you are living?: Yes (11/26/2019  2:23 PM)  Do you feel you are safe in your relationship(s)?: Yes (11/26/2019  2:23 PM)      Review of Systems:  Please see above.  The rest of the review of systems are negative for all systems.     Pap History:   Yes - updated in Problem List and Health Maintenance accordingly  Cancer Screening       Status Date      MAMMOGRAM Next Due 11/7/2021      Done 11/7/2019 MAMMO SCREENING BILATERAL     Patient has more history with this topic...    COLONOSCOPY Next Due 8/22/2026      Done 8/22/2016 COLONOSCOPY EXTERNAL RESULT    PAP SMEAR This plan is no longer active.           Patient Care Team:  Carol Dickinson MD as PCP - General (Family Medicine)  Susu Baker MD as Physician (Obstetrics and Gynecology)  Carol Dickinson MD as Assigned PCP  Mauro Mckeon MD  "as Physician (Internal Medicine)  Leann Kaminski MD (Dermatology)        History     Reviewed By Date/Time Sections Reviewed    Carol Dickinson MD 11/26/2019  2:54 PM Medical            Objective:   Vital Signs:   Visit Vitals  /78 (Patient Site: Left Arm, Patient Position: Sitting, Cuff Size: Adult Regular)   Pulse 76   Ht 5' 4.75\" (1.645 m)   Wt 147 lb 14.4 oz (67.1 kg)   LMP  (LMP Unknown)   BMI 24.80 kg/m           PHYSICAL EXAM  Physical Exam  Constitutional:       General: She is not in acute distress.     Appearance: Normal appearance. She is well-developed and normal weight.   HENT:      Head: Normocephalic and atraumatic.      Right Ear: Tympanic membrane, ear canal and external ear normal.      Left Ear: Tympanic membrane, ear canal and external ear normal.      Nose: Nose normal.      Mouth/Throat:      Mouth: Mucous membranes are moist.      Pharynx: Oropharynx is clear.   Eyes:      Conjunctiva/sclera: Conjunctivae normal.   Neck:      Musculoskeletal: Neck supple.   Cardiovascular:      Rate and Rhythm: Normal rate and regular rhythm.      Heart sounds: Normal heart sounds.   Pulmonary:      Effort: Pulmonary effort is normal.      Breath sounds: Normal breath sounds.   Abdominal:      General: Bowel sounds are normal. There is no distension.      Palpations: Abdomen is soft. There is no mass.      Tenderness: There is abdominal tenderness (lower abdominal tenderness). There is no right CVA tenderness, left CVA tenderness, guarding or rebound.   Musculoskeletal: Normal range of motion.   Skin:     General: Skin is warm and dry.   Neurological:      General: No focal deficit present.      Mental Status: She is alert and oriented to person, place, and time.   Psychiatric:         Mood and Affect: Mood is anxious. Mood is not depressed or elated. Affect is not labile, blunt, flat, angry, tearful or inappropriate.         Speech: Speech normal.         Behavior: Behavior normal. Behavior is " cooperative.         Thought Content: Thought content normal.         Cognition and Memory: Cognition and memory normal.         Judgment: Judgment normal.        CAROL-7 Screening Results:  Feeling nervous, anxious or on edge: 2 (2019  3:00 PM)  Not being able to stop or control worry: 0 (2019  3:00 PM)  Worrying too much about different things: 1 (2019  3:00 PM)  Trouble relaxin (2019  3:00 PM)  Being so restless that is is hard to sit still: 2 (2019  3:00 PM)  Becoming easily annnoyed or irritable: 3 (2019  3:00 PM)  Feeling afraid as if something awful might happen: 1 (2019  3:00 PM)  CAROL-7 Total: 11 (2019  3:00 PM)  How difficult did these problems make it for you to do your work, take care of things at home or get along with other people? : Somewhat difficult (2019  3:00 PM)  How difficult did these problems make it for you to do your work, take care of things at home or get along with other people? : Somewhat difficult (2019  3:00 PM)       The 10-year ASCVD risk score (Booker ANISA Jr., et al., 2013) is: 4.4%    Values used to calculate the score:      Age: 64 years      Sex: Female      Is Non- : No      Diabetic: No      Tobacco smoker: No      Systolic Blood Pressure: 138 mmHg      Is BP treated: No      HDL Cholesterol: 62 mg/dL      Total Cholesterol: 203 mg/dL         Medication List          Accurate as of 2019  8:21 PM. If you have any questions, ask your nurse or doctor.            START taking these medications    sertraline 50 MG tablet  Also known as:  ZOLOFT  INSTRUCTIONS:  Take 1 tablet (50 mg total) by mouth daily.  Started by:  Carol Dickinson MD           CONTINUE taking these medications    alendronate 70 MG tablet  Also known as:  FOSAMAX  INSTRUCTIONS:  Take 1 tablet (70 mg total) by mouth every 7 days. Take in the morning on an empty stomach with a full glass of water 30 minutes before food         cetirizine 10 MG tablet  Also known as:  ZyrTEC  INSTRUCTIONS:  Take 10 mg by mouth daily.        estradiol 0.01 % (0.1 mg/gram) vaginal cream  Also known as:  ESTRACE  INSTRUCTIONS:  INSERT 2 GRAM BY VAGINAL ROUTE TWICE WEEKLY        fish oil-omega-3 fatty acids 300-1,000 mg capsule  INSTRUCTIONS:  Take 1 g by mouth 2 (two) times a day.        MULTIPLE VITAMIN ESSENTIAL per tablet  INSTRUCTIONS:  Take 1 tablet by mouth 2 (two) times a day.   Generic drug:  multivitamin        pramipexole 0.25 MG tablet  Also known as:  MIRAPEX  INSTRUCTIONS:  Take 1 tablet (0.25 mg total) by mouth at bedtime.              Where to Get Your Medications      These medications were sent to [place] [id number] [place] [facility] - [place], [place] - [address]  [address] [facility] [id number]    Phone:  [phone]     pramipexole 0.25 MG tablet    sertraline 50 MG tablet         Additional Screenings Completed Today:

## 2021-07-03 NOTE — ADDENDUM NOTE
Addendum Note by Natalie Dickinson MD at 6/20/2017  5:19 PM     Author: Natalie Dickinson MD Service: -- Author Type: Physician    Filed: 6/20/2017  5:19 PM Encounter Date: 6/20/2017 Status: Signed    : Natalie Dickinson MD (Physician)    Addended by: NATALIE DICKINSON on: 6/20/2017 05:19 PM        Modules accepted: Orders

## 2021-07-08 ASSESSMENT — ANXIETY QUESTIONNAIRES: GAD7 TOTAL SCORE: 9

## 2021-08-04 ENCOUNTER — OFFICE VISIT (OUTPATIENT)
Dept: FAMILY MEDICINE | Facility: CLINIC | Age: 66
End: 2021-08-04
Payer: COMMERCIAL

## 2021-08-04 VITALS
HEIGHT: 65 IN | RESPIRATION RATE: 14 BRPM | BODY MASS INDEX: 26.66 KG/M2 | DIASTOLIC BLOOD PRESSURE: 82 MMHG | SYSTOLIC BLOOD PRESSURE: 136 MMHG | WEIGHT: 160 LBS | HEART RATE: 76 BPM

## 2021-08-04 DIAGNOSIS — K76.0 HEPATIC STEATOSIS: ICD-10-CM

## 2021-08-04 DIAGNOSIS — Z00.00 ENCOUNTER FOR PREVENTIVE CARE: ICD-10-CM

## 2021-08-04 DIAGNOSIS — S62.102D CLOSED FRACTURE OF LEFT WRIST WITH ROUTINE HEALING, SUBSEQUENT ENCOUNTER: ICD-10-CM

## 2021-08-04 DIAGNOSIS — F41.1 ANXIETY STATE: ICD-10-CM

## 2021-08-04 DIAGNOSIS — E04.2 MULTIPLE THYROID NODULES: ICD-10-CM

## 2021-08-04 DIAGNOSIS — F50.819 BINGE EATING DISORDER: ICD-10-CM

## 2021-08-04 DIAGNOSIS — G47.00 INSOMNIA, UNSPECIFIED TYPE: ICD-10-CM

## 2021-08-04 DIAGNOSIS — Z51.81 ENCOUNTER FOR THERAPEUTIC DRUG MONITORING: ICD-10-CM

## 2021-08-04 DIAGNOSIS — E78.5 DYSLIPIDEMIA: Primary | ICD-10-CM

## 2021-08-04 DIAGNOSIS — G25.81 RESTLESS LEGS SYNDROME: ICD-10-CM

## 2021-08-04 DIAGNOSIS — Z98.890 H/O COLONOSCOPY: ICD-10-CM

## 2021-08-04 DIAGNOSIS — Z90.710 S/P VAGINAL HYSTERECTOMY: ICD-10-CM

## 2021-08-04 DIAGNOSIS — M81.0 SENILE OSTEOPOROSIS: ICD-10-CM

## 2021-08-04 DIAGNOSIS — J30.9 ALLERGIC RHINITIS, UNSPECIFIED SEASONALITY, UNSPECIFIED TRIGGER: ICD-10-CM

## 2021-08-04 DIAGNOSIS — Z85.828 HISTORY OF BASAL CELL CARCINOMA: ICD-10-CM

## 2021-08-04 DIAGNOSIS — K57.30 DIVERTICULOSIS OF LARGE INTESTINE WITHOUT HEMORRHAGE: ICD-10-CM

## 2021-08-04 DIAGNOSIS — M20.001 FINGER DEFORMITY, RIGHT: ICD-10-CM

## 2021-08-04 PROBLEM — K76.9 CHRONIC NONALCOHOLIC LIVER DISEASE: Status: RESOLVED | Noted: 2021-08-04 | Resolved: 2021-08-04

## 2021-08-04 LAB
ALBUMIN SERPL-MCNC: 4.2 G/DL (ref 3.5–5)
ALP SERPL-CCNC: 76 U/L (ref 45–120)
ALT SERPL W P-5'-P-CCNC: 21 U/L (ref 0–45)
ANION GAP SERPL CALCULATED.3IONS-SCNC: 11 MMOL/L (ref 5–18)
AST SERPL W P-5'-P-CCNC: 38 U/L (ref 0–40)
BILIRUB SERPL-MCNC: 0.4 MG/DL (ref 0–1)
BUN SERPL-MCNC: 8 MG/DL (ref 8–22)
CALCIUM SERPL-MCNC: 9.9 MG/DL (ref 8.5–10.5)
CHLORIDE BLD-SCNC: 106 MMOL/L (ref 98–107)
CHOLEST SERPL-MCNC: 269 MG/DL
CO2 SERPL-SCNC: 25 MMOL/L (ref 22–31)
CREAT SERPL-MCNC: 0.81 MG/DL (ref 0.6–1.1)
FASTING STATUS PATIENT QL REPORTED: YES
GFR SERPL CREATININE-BSD FRML MDRD: 77 ML/MIN/1.73M2
GLUCOSE BLD-MCNC: 121 MG/DL (ref 70–125)
HDLC SERPL-MCNC: 88 MG/DL
LDLC SERPL CALC-MCNC: 165 MG/DL
POTASSIUM BLD-SCNC: 4.5 MMOL/L (ref 3.5–5)
PROT SERPL-MCNC: 7.7 G/DL (ref 6–8)
SODIUM SERPL-SCNC: 142 MMOL/L (ref 136–145)
TRIGL SERPL-MCNC: 81 MG/DL

## 2021-08-04 PROCEDURE — 36415 COLL VENOUS BLD VENIPUNCTURE: CPT | Performed by: FAMILY MEDICINE

## 2021-08-04 PROCEDURE — 96127 BRIEF EMOTIONAL/BEHAV ASSMT: CPT | Performed by: FAMILY MEDICINE

## 2021-08-04 PROCEDURE — 99214 OFFICE O/P EST MOD 30 MIN: CPT | Mod: 25 | Performed by: FAMILY MEDICINE

## 2021-08-04 PROCEDURE — 80061 LIPID PANEL: CPT | Performed by: FAMILY MEDICINE

## 2021-08-04 PROCEDURE — 99396 PREV VISIT EST AGE 40-64: CPT | Performed by: FAMILY MEDICINE

## 2021-08-04 PROCEDURE — 80053 COMPREHEN METABOLIC PANEL: CPT | Performed by: FAMILY MEDICINE

## 2021-08-04 RX ORDER — CETIRIZINE HYDROCHLORIDE 10 MG/1
10 TABLET ORAL DAILY
Qty: 90 TABLET | Refills: 3 | Status: SHIPPED | OUTPATIENT
Start: 2021-08-04 | End: 2021-08-04 | Stop reason: ALTCHOICE

## 2021-08-04 RX ORDER — PRAMIPEXOLE DIHYDROCHLORIDE 0.25 MG/1
0.25 TABLET ORAL AT BEDTIME
Qty: 90 TABLET | Refills: 3 | Status: SHIPPED | OUTPATIENT
Start: 2021-08-04 | End: 2022-10-06

## 2021-08-04 ASSESSMENT — ENCOUNTER SYMPTOMS
CONSTIPATION: 0
SHORTNESS OF BREATH: 0
HEMATURIA: 0
ABDOMINAL PAIN: 0
PARESTHESIAS: 0
DIZZINESS: 0
FEVER: 0
DIARRHEA: 0
MYALGIAS: 0
HEARTBURN: 0
JOINT SWELLING: 0
CHILLS: 0
PALPITATIONS: 0
FREQUENCY: 0
ARTHRALGIAS: 0
SORE THROAT: 0
BREAST MASS: 0
WEAKNESS: 0
COUGH: 0
HEMATOCHEZIA: 0
NAUSEA: 0
HEADACHES: 0
DYSURIA: 0
EYE PAIN: 0
NERVOUS/ANXIOUS: 0

## 2021-08-04 ASSESSMENT — MIFFLIN-ST. JEOR: SCORE: 1276.64

## 2021-08-04 NOTE — ASSESSMENT & PLAN NOTE
Covid vaccine - done.   Flu shot - recommended in the fall.   Pap: done by Dr. Baker.   Mammo: done by Dr. Baker.   Colonoscopy: due and she will call Dr. Dunn.    Std testing desired: declined,  offered  Osteoporosis prevention discussed.  vitamin d levels ordered. Recommend daily calcium and vitamin d intake to keep good bone health. Recommend weight bearing exercise, no tobacco, and limit alcohol  dexa - managed by Dr. Mckeon and planned for the fall.   Recommend sunscreen, exercise, & healthy diet.  Offered cbc, cmp, lipids and asked what other testing she  desires today  I have had an Advance Directives discussion with the patient.   Body mass index is 26.63 kg/m .   mychart active.

## 2021-08-04 NOTE — ASSESSMENT & PLAN NOTE
Refill of trazodone not needed but can be refilled when she needs. Continue trazodone 50 mg po q hs prn.

## 2021-08-04 NOTE — PROGRESS NOTES
15 mins total clinic time was spent with this patient outside and  beyond the prevent with review of the medical record and with documentation.  This time was spent on the day of service.       SUBJECTIVE:   CC: Awilda Pandya is an 66 year old woman who presents for preventive health visit.       Patient has been advised of split billing requirements and indicates understanding: Yes  Healthy Habits:     Getting at least 3 servings of Calcium per day:  Yes    Bi-annual eye exam:  Yes    Dental care twice a year:  Yes    Sleep apnea or symptoms of sleep apnea:  None    Diet:  Carbohydrate counting and Gluten-free/reduced    Frequency of exercise:  None    Taking medications regularly:  Yes    Medication side effects:  None    PHQ-2 Total Score: 1    Additional concerns today:  No        Today's PHQ-2 Score:   PHQ-2 ( 1999 [employer]) 8/4/2021   Q1: Little interest or pleasure in doing things 0   Q2: Feeling down, depressed or hopeless 1   PHQ-2 Score 1   Q1: Little interest or pleasure in doing things Not at all   Q2: Feeling down, depressed or hopeless Several days   PHQ-2 Score 1       Abuse: Current or Past (Physical, Sexual or Emotional) - NO  Do you feel safe in your environment? NO        Social History     Tobacco Use     Smoking status: Never Smoker     Smokeless tobacco: Never Used   Substance Use Topics     Alcohol use: Yes     Comment: Alcoholic Drinks/day: rarely         Alcohol Use 8/4/2021   Prescreen: >3 drinks/day or >7 drinks/week? No   Prescreen: >3 drinks/day or >7 drinks/week? -       Reviewed orders with patient.  Reviewed health maintenance and updated orders accordingly - Yes  Lab work is in process    Breast Cancer Screening:  Any new diagnosis of family breast, ovarian, or bowel cancer? No    FHS-7: No flowsheet data found.    done by gyn  Pertinent mammograms are reviewed under the imaging tab.    History of abnormal Pap smear: YES - updated in Problem List and Health Maintenance accordingly      Reviewed and updated as needed this visit by clinical staff  Tobacco  Allergies  Meds  Problems  Med Hx  Surg Hx  Fam Hx          Reviewed and updated as needed this visit by Provider   Allergies  Meds  Problems  Med Hx  Surg Hx  Fam Hx         Past Medical History:   Diagnosis Date     Abnormal weight gain 10/30/2018     Basal cell carcinoma      BMI 25.0-25.9,adult 6/20/2017     Chronic nonalcoholic liver disease 8/4/2021     Chronic right-sided thoracic back pain 11/27/2018     Fracture of left wrist with routine healing 10/23/2019     Left wrist pain 10/30/2018     Osteoporosis 8/4/2021     S/P hysterectomy 6/20/2017    Sees Dr. johansen     Uterovaginal prolapse       Past Surgical History:   Procedure Laterality Date     COLPORRHAPHY N/A 1/19/2016    Procedure: UTEROSACRAL LIGAMENT SUSPENSION, CYSTOCELE, CYSTOSCOPY;  Surgeon: Susu Johansen MD;  Location: [facility] [place];  Service:      HYSTERECTOMY TOTAL ABDOMINAL  01/2016     MOUTH SURGERY       LA VAGINAL HYSTERECTOMY,UTERUS 250 GMS/< N/A 1/19/2016    Procedure: VAGINAL HYSTERECTOMY;  Surgeon: Susu Johansen MD;  Location: [facility] [place];  Service: Gynecology     TUBAL LIGATION         Review of Systems   Constitutional: Negative for chills and fever.   HENT: Negative for congestion, ear pain, hearing loss and sore throat.    Eyes: Negative for pain and visual disturbance.   Respiratory: Negative for cough and shortness of breath.    Cardiovascular: Negative for chest pain, palpitations and peripheral edema.   Gastrointestinal: Negative for abdominal pain, constipation, diarrhea, heartburn, hematochezia and nausea.   Breasts:  Negative for tenderness, breast mass and discharge.   Genitourinary: Negative for dysuria, frequency, genital sores, hematuria, pelvic pain, urgency, vaginal bleeding and vaginal discharge.   Musculoskeletal: Negative for arthralgias, joint swelling and myalgias.   Skin: Negative for rash.   Neurological: Negative  "for dizziness, weakness, headaches and paresthesias.   Psychiatric/Behavioral: Negative for mood changes. The patient is not nervous/anxious.           OBJECTIVE:   /82   Pulse 76   Resp 14   Ht 1.651 m (5' 5\")   Wt 72.6 kg (160 lb)   LMP  (LMP Unknown)   Breastfeeding No   BMI 26.63 kg/m    Physical Exam  Constitutional:       Appearance: Normal appearance.   HENT:      Head: Normocephalic and atraumatic.   Cardiovascular:      Rate and Rhythm: Normal rate and regular rhythm.      Heart sounds: Normal heart sounds.   Pulmonary:      Effort: Pulmonary effort is normal.      Breath sounds: Normal breath sounds.   Abdominal:      General: Bowel sounds are normal.      Palpations: Abdomen is soft.   Musculoskeletal:         General: Normal range of motion.      Cervical back: Normal range of motion and neck supple.   Skin:     General: Skin is warm and dry.   Neurological:      General: No focal deficit present.      Mental Status: She is alert and oriented to person, place, and time.           Diagnostic Test Results:  Labs reviewed in Epic    ASSESSMENT/PLAN:     Problem List Items Addressed This Visit        Respiratory    Allergic rhinitis, unspecified     Has been using hydroxizine which works for both allergies and anxiety            Digestive    Hepatic steatosis     Note normal liver function tests from 2019.  Will recheck labs.          Relevant Orders    Comprehensive metabolic panel (BMP + Alb, Alk Phos, ALT, AST, Total. Bili, TP)    Diverticulosis of large intestine     Not current problem, controlled with diet.             Endocrine    Dyslipidemia - Primary     Will recheck lipids. Discussed statins she is not ready for this, will recheck lipids now.          Relevant Orders    Lipid Profile    Multiple thyroid nodules     Had a biopsy 2010 and and it was determined to be a benign colloid nodule. It does not appear to enlarge, so we just monitor clinically.             Musculoskeletal and " Integumentary    Restless legs syndrome     Needs refill of mirapex. No side effects noted.          Relevant Medications    pramipexole (MIRAPEX) 0.25 MG tablet    Senile osteoporosis     Sees Dr. Mckeon. dexa scheduled for fall. Continue fosamax through Dr. Mckeon.         History of basal cell carcinoma     Sees dermatology annually for skin check         Finger deformity, right     She was offered rheumatologist and she declined.          RESOLVED: Fracture of left wrist with routine healing     Resolved.             Behavioral    Eating disorder     Still uses zoloft 100mg po q day to control appetite            Other    H/O colonoscopy     She is due for follow up and she will check with GI doc, Dr. Dunn.          Encounter for preventive care     Covid vaccine - done.   Flu shot - recommended in the fall.   Pap: done by Dr. Baker.   Mammo: done by Dr. Baker.   Colonoscopy: due and she will call Dr. Dunn.    Std testing desired: declined,  offered  Osteoporosis prevention discussed.  vitamin d levels ordered. Recommend daily calcium and vitamin d intake to keep good bone health. Recommend weight bearing exercise, no tobacco, and limit alcohol  dexa - managed by Dr. Mckeon and planned for the fall.   Recommend sunscreen, exercise, & healthy diet.  Offered cbc, cmp, lipids and asked what other testing she  desires today  I have had an Advance Directives discussion with the patient.   Body mass index is 26.63 kg/m .   mychart active.          BMI 26.0-26.9,adult     Discussed weight bearing exercise.          S/P vaginal hysterectomy     Managed by gynecology.         Anxiety state     Takes zoloft 100mg po q day and hydroxyzine prn.  Works well.          Insomnia     Refill of trazodone not needed but can be refilled when she needs. Continue trazodone 50 mg po q hs prn.           Other Visit Diagnoses     Encounter for therapeutic drug monitoring        Relevant Orders    CBC with platelets        "    Patient has been advised of split billing requirements and indicates understanding: Yes  COUNSELING:  Reviewed preventive health counseling, as reflected in patient instructions    Estimated body mass index is 26.63 kg/m  as calculated from the following:    Height as of this encounter: 1.651 m (5' 5\").    Weight as of this encounter: 72.6 kg (160 lb).    Weight management plan: Discussed healthy diet and exercise guidelines    She reports that she has never smoked. She has never used smokeless tobacco.      Carol Dickinson MD  [facility]  "

## 2021-08-04 NOTE — ASSESSMENT & PLAN NOTE
Had a biopsy 2010 and and it was determined to be a benign colloid nodule. It does not appear to enlarge, so we just monitor clinically.

## 2021-08-28 DIAGNOSIS — F41.1 ANXIETY STATE: ICD-10-CM

## 2021-08-28 RX ORDER — HYDROXYZINE PAMOATE 50 MG/1
CAPSULE ORAL
Qty: 90 CAPSULE | Refills: 10 | Status: SHIPPED | OUTPATIENT
Start: 2021-08-28

## 2021-09-07 DIAGNOSIS — G47.00 INSOMNIA, UNSPECIFIED TYPE: ICD-10-CM

## 2021-09-07 RX ORDER — TRAZODONE HYDROCHLORIDE 50 MG/1
TABLET, FILM COATED ORAL
Qty: 90 TABLET | Refills: 3 | Status: SHIPPED | OUTPATIENT
Start: 2021-09-07 | End: 2022-11-23

## 2021-09-07 NOTE — TELEPHONE ENCOUNTER
"Routing refill request to provider for review/approval because:  Early refill request    Last Written Prescription Date:  10/9/20  Last Fill Quantity: 90,  # refills: 3   Last office visit provider:  8/4/21     Requested Prescriptions   Pending Prescriptions Disp Refills     traZODone (DESYREL) 50 MG tablet [Pharmacy Med Name: TRAZODONE 50 MG TABLET] 90 tablet 3     Sig: TAKE 1 TABLET BY MOUTH EVERYDAY AT BEDTIME       Serotonin Modulators Passed - 9/7/2021 12:34 AM        Passed - Recent (12 mo) or future (30 days) visit within the authorizing provider's specialty     Patient has had an office visit with the authorizing provider or a provider within the authorizing providers department within the previous 12 mos or has a future within next 30 days. See \"Patient Info\" tab in inbasket, or \"Choose Columns\" in Meds & Orders section of the refill encounter.              Passed - Medication is active on med list        Passed - Patient is age 18 or older        Passed - No active pregnancy on record        Passed - No positive pregnancy test in past 12 months             Franko Mckeon RN 09/07/21 9:06 AM  "

## 2021-09-15 ENCOUNTER — ANCILLARY PROCEDURE (OUTPATIENT)
Dept: BONE DENSITY | Facility: CLINIC | Age: 66
End: 2021-09-15
Attending: INTERNAL MEDICINE
Payer: COMMERCIAL

## 2021-09-15 DIAGNOSIS — M81.0 SENILE OSTEOPOROSIS: ICD-10-CM

## 2021-09-15 PROCEDURE — 77080 DXA BONE DENSITY AXIAL: CPT | Mod: 26 | Performed by: INTERNAL MEDICINE

## 2021-10-16 DIAGNOSIS — M81.0 SENILE OSTEOPOROSIS: ICD-10-CM

## 2021-10-17 ENCOUNTER — HEALTH MAINTENANCE LETTER (OUTPATIENT)
Age: 66
End: 2021-10-17

## 2021-10-17 RX ORDER — ALENDRONATE SODIUM 70 MG/1
TABLET ORAL
Qty: 12 TABLET | Refills: 3 | OUTPATIENT
Start: 2021-10-17

## 2021-10-17 NOTE — TELEPHONE ENCOUNTER
"Routing refill request to provider for review/approval because:  Early refill requested.    Last Written Prescription Date:  12/10/20  Last Fill Quantity: 12,  # refills: 3   Last office visit provider:  8/4/21     Requested Prescriptions   Pending Prescriptions Disp Refills     alendronate (FOSAMAX) 70 MG tablet [Pharmacy Med Name: ALENDRONATE SODIUM 70 MG TAB] 12 tablet 3     Sig: TAKE 1 TABLET BY MOUTH EVERY 7 DAYS IN THE AM WITH FULL GLASS OF WATER ON EMPTY STOMACH       Bisphosphonates Passed - 10/16/2021  3:37 AM        Passed - Recent (12 mo) or future (30 days) visit within the authorizing provider's specialty     Patient has had an office visit with the authorizing provider or a provider within the authorizing providers department within the previous 12 mos or has a future within next 30 days. See \"Patient Info\" tab in inbasket, or \"Choose Columns\" in Meds & Orders section of the refill encounter.              Passed - Dexa on file within past 2 years     Please review last Dexa result.           Passed - Medication is active on med list        Passed - Patient is age 18 or older        Passed - Normal serum creatinine on file within past 12 months     Recent Labs   Lab Test 08/04/21  0921   CR 0.81       Ok to refill medication if creatinine is low               Franko Mckeon RN 10/17/21 10:09 AM  "

## 2021-10-19 RX ORDER — ALENDRONATE SODIUM 70 MG/1
TABLET ORAL
Qty: 12 TABLET | Refills: 3 | Status: SHIPPED | OUTPATIENT
Start: 2021-10-19 | End: 2022-10-28

## 2022-01-11 ENCOUNTER — E-VISIT (OUTPATIENT)
Dept: FAMILY MEDICINE | Facility: CLINIC | Age: 67
End: 2022-01-11
Payer: COMMERCIAL

## 2022-01-11 DIAGNOSIS — F41.1 ANXIETY STATE: Primary | ICD-10-CM

## 2022-01-11 PROCEDURE — 99422 OL DIG E/M SVC 11-20 MIN: CPT | Performed by: FAMILY MEDICINE

## 2022-01-11 RX ORDER — LORAZEPAM 0.5 MG/1
0.5 TABLET ORAL EVERY 6 HOURS PRN
Qty: 4 TABLET | Refills: 0 | Status: SHIPPED | OUTPATIENT
Start: 2022-01-11 | End: 2022-11-23

## 2022-01-11 ASSESSMENT — ANXIETY QUESTIONNAIRES
GAD7 TOTAL SCORE: 17
4. TROUBLE RELAXING: NEARLY EVERY DAY
GAD7 TOTAL SCORE: 17
3. WORRYING TOO MUCH ABOUT DIFFERENT THINGS: SEVERAL DAYS
GAD7 TOTAL SCORE: 17
8. IF YOU CHECKED OFF ANY PROBLEMS, HOW DIFFICULT HAVE THESE MADE IT FOR YOU TO DO YOUR WORK, TAKE CARE OF THINGS AT HOME, OR GET ALONG WITH OTHER PEOPLE?: VERY DIFFICULT
1. FEELING NERVOUS, ANXIOUS, OR ON EDGE: NEARLY EVERY DAY
6. BECOMING EASILY ANNOYED OR IRRITABLE: MORE THAN HALF THE DAYS
7. FEELING AFRAID AS IF SOMETHING AWFUL MIGHT HAPPEN: NEARLY EVERY DAY
5. BEING SO RESTLESS THAT IT IS HARD TO SIT STILL: MORE THAN HALF THE DAYS
7. FEELING AFRAID AS IF SOMETHING AWFUL MIGHT HAPPEN: NEARLY EVERY DAY
2. NOT BEING ABLE TO STOP OR CONTROL WORRYING: NEARLY EVERY DAY

## 2022-01-11 NOTE — ASSESSMENT & PLAN NOTE
Small rx of ativan given and recommend a visit to discuss. Please use sparingly, ativan can be addictive.   Also do not use if using hydroxyzine as it can be too strong and sedating and may cause life threatening reaction..

## 2022-01-12 ASSESSMENT — ANXIETY QUESTIONNAIRES: GAD7 TOTAL SCORE: 17

## 2022-01-18 VITALS
SYSTOLIC BLOOD PRESSURE: 126 MMHG | HEART RATE: 72 BPM | HEIGHT: 62 IN | BODY MASS INDEX: 25.86 KG/M2 | WEIGHT: 140.5 LBS | DIASTOLIC BLOOD PRESSURE: 72 MMHG

## 2022-01-18 VITALS
HEART RATE: 82 BPM | BODY MASS INDEX: 25.79 KG/M2 | WEIGHT: 141 LBS | SYSTOLIC BLOOD PRESSURE: 138 MMHG | OXYGEN SATURATION: 98 % | DIASTOLIC BLOOD PRESSURE: 78 MMHG

## 2022-01-18 VITALS
WEIGHT: 143 LBS | RESPIRATION RATE: 20 BRPM | HEART RATE: 92 BPM | SYSTOLIC BLOOD PRESSURE: 162 MMHG | HEIGHT: 65 IN | DIASTOLIC BLOOD PRESSURE: 60 MMHG | BODY MASS INDEX: 23.82 KG/M2

## 2022-01-18 VITALS
HEART RATE: 90 BPM | SYSTOLIC BLOOD PRESSURE: 122 MMHG | DIASTOLIC BLOOD PRESSURE: 60 MMHG | OXYGEN SATURATION: 99 % | BODY MASS INDEX: 29.26 KG/M2 | WEIGHT: 159 LBS | HEIGHT: 62 IN

## 2022-01-18 VITALS — DIASTOLIC BLOOD PRESSURE: 82 MMHG | SYSTOLIC BLOOD PRESSURE: 136 MMHG | RESPIRATION RATE: 16 BRPM | HEART RATE: 84 BPM

## 2022-01-18 VITALS
BODY MASS INDEX: 26.16 KG/M2 | OXYGEN SATURATION: 97 % | DIASTOLIC BLOOD PRESSURE: 78 MMHG | SYSTOLIC BLOOD PRESSURE: 144 MMHG | HEART RATE: 65 BPM | WEIGHT: 143 LBS

## 2022-01-18 VITALS
HEART RATE: 80 BPM | DIASTOLIC BLOOD PRESSURE: 78 MMHG | OXYGEN SATURATION: 98 % | WEIGHT: 153.7 LBS | DIASTOLIC BLOOD PRESSURE: 82 MMHG | SYSTOLIC BLOOD PRESSURE: 133 MMHG | BODY MASS INDEX: 28.11 KG/M2 | SYSTOLIC BLOOD PRESSURE: 136 MMHG | RESPIRATION RATE: 16 BRPM | HEART RATE: 86 BPM

## 2022-01-18 VITALS
BODY MASS INDEX: 29.08 KG/M2 | WEIGHT: 159 LBS | HEART RATE: 78 BPM | SYSTOLIC BLOOD PRESSURE: 120 MMHG | DIASTOLIC BLOOD PRESSURE: 62 MMHG | OXYGEN SATURATION: 95 %

## 2022-01-18 VITALS — SYSTOLIC BLOOD PRESSURE: 128 MMHG | DIASTOLIC BLOOD PRESSURE: 82 MMHG | HEART RATE: 84 BPM | RESPIRATION RATE: 16 BRPM

## 2022-01-18 ASSESSMENT — PATIENT HEALTH QUESTIONNAIRE - PHQ9
SUM OF ALL RESPONSES TO PHQ QUESTIONS 1-9: 4
SUM OF ALL RESPONSES TO PHQ QUESTIONS 1-9: 6
SUM OF ALL RESPONSES TO PHQ QUESTIONS 1-9: 4
SUM OF ALL RESPONSES TO PHQ QUESTIONS 1-9: 6
SUM OF ALL RESPONSES TO PHQ QUESTIONS 1-9: 5
SUM OF ALL RESPONSES TO PHQ QUESTIONS 1-9: 9
SUM OF ALL RESPONSES TO PHQ QUESTIONS 1-9: 7

## 2022-02-18 ENCOUNTER — OFFICE VISIT (OUTPATIENT)
Dept: FAMILY MEDICINE | Facility: CLINIC | Age: 67
End: 2022-02-18
Payer: COMMERCIAL

## 2022-02-18 VITALS
WEIGHT: 162.3 LBS | DIASTOLIC BLOOD PRESSURE: 78 MMHG | HEIGHT: 65 IN | TEMPERATURE: 98 F | RESPIRATION RATE: 14 BRPM | BODY MASS INDEX: 27.04 KG/M2 | HEART RATE: 93 BPM | OXYGEN SATURATION: 96 % | SYSTOLIC BLOOD PRESSURE: 130 MMHG

## 2022-02-18 DIAGNOSIS — E66.3 OVERWEIGHT WITH BODY MASS INDEX (BMI) OF 27 TO 27.9 IN ADULT: ICD-10-CM

## 2022-02-18 DIAGNOSIS — M20.001 FINGER DEFORMITY, RIGHT: ICD-10-CM

## 2022-02-18 DIAGNOSIS — F41.1 ANXIETY STATE: Primary | ICD-10-CM

## 2022-02-18 DIAGNOSIS — M25.511 TRIGGER POINT OF SHOULDER REGION, RIGHT: ICD-10-CM

## 2022-02-18 DIAGNOSIS — H93.13 TINNITUS, BILATERAL: ICD-10-CM

## 2022-02-18 PROCEDURE — 99214 OFFICE O/P EST MOD 30 MIN: CPT | Performed by: FAMILY MEDICINE

## 2022-02-18 RX ORDER — BUSPIRONE HYDROCHLORIDE 5 MG/1
5 TABLET ORAL 3 TIMES DAILY
Qty: 90 TABLET | Refills: 0 | Status: SHIPPED | OUTPATIENT
Start: 2022-02-18 | End: 2022-03-16

## 2022-02-18 RX ORDER — SERTRALINE HYDROCHLORIDE 100 MG/1
100 TABLET, FILM COATED ORAL
COMMUNITY
Start: 2021-12-01 | End: 2022-04-21

## 2022-02-18 ASSESSMENT — ANXIETY QUESTIONNAIRES
2. NOT BEING ABLE TO STOP OR CONTROL WORRYING: NEARLY EVERY DAY
7. FEELING AFRAID AS IF SOMETHING AWFUL MIGHT HAPPEN: MORE THAN HALF THE DAYS
GAD7 TOTAL SCORE: 15
7. FEELING AFRAID AS IF SOMETHING AWFUL MIGHT HAPPEN: MORE THAN HALF THE DAYS
GAD7 TOTAL SCORE: 15
3. WORRYING TOO MUCH ABOUT DIFFERENT THINGS: SEVERAL DAYS
5. BEING SO RESTLESS THAT IT IS HARD TO SIT STILL: MORE THAN HALF THE DAYS
4. TROUBLE RELAXING: MORE THAN HALF THE DAYS
1. FEELING NERVOUS, ANXIOUS, OR ON EDGE: NEARLY EVERY DAY
6. BECOMING EASILY ANNOYED OR IRRITABLE: MORE THAN HALF THE DAYS
GAD7 TOTAL SCORE: 15

## 2022-02-18 NOTE — PROGRESS NOTES
Answers for HPI/ROS submitted by the patient on 2/18/2022  CAROL 7 TOTAL SCORE: 15  Depression/Anxiety: Anxiety  Anxiety since last: : worse  Other associated symotome: : Yes  Significant life event: : grief or loss  Anxious:: Yes  Current substance use:: No  Your back pain is: new  What do you think is the original cause of your back pain?: not sure  When did you first notice your back pain? : in the last week  How would you describe your back pain? : burning, sharp  How often do you feel your back pain? : constantly  Where is your back pain located? : right upper back  Where does your back pain spread? : right shoulder  Since you noticed your back pain, how has it changed? : always present, but gets better and worse  Does your back pain interfere with your job?: Not applicable  On a scale of 1-10 (10 being the worst), how strong is your back pain?: 7  What makes your back pain worse? : certain positions  Acupuncture:: not tried  Acetaminophen: helpful  Activity or Exercise: not helpful  Chiropractor: not tried  Cold: not tried  Heat: helpful  Massage: not helpful  Muscle relaxants : not helpful  NSAIDS (Ibuprofen, Naproxen) : helpful  Opioids: not tried  Physical Therapy: not tried  Rest: not helpful  Steroid Injection: not tried  Stretching : not helpful  Surgery: not tried  TENS Unit: not tried  Topical pain relievers : not tried  Do you see any other healthcare providers for your back pain? : None  How many servings of fruits and vegetables do you eat daily?: 2-3  On average, how many sweetened beverages do you drink each day (Examples: soda, juice, sweet tea, etc.  Do NOT count diet or artificially sweetened beverages)?: 0  How many minutes a day do you exercise enough to make your heart beat faster?: 9 or less  How many days a week do you exercise enough to make your heart beat faster?: 3 or less  How many days per week do you miss taking your medication?: 0             Assessment & Plan   Problem List Items  Addressed This Visit        Nervous and Auditory    Tinnitus, bilateral     Chronic tinnitus  Audiology and ent consults ordered.         Relevant Orders    Otolaryngology Referral    Adult Audiology Referral    Trigger point of shoulder region, right     Right upper back pain,  trigger point noted on exam.  Offered trigger point injection which she will schedule if needed.   Physical therapy also recommended. Order placed.          Relevant Orders    Physical Therapy Referral       Musculoskeletal and Integumentary    Finger deformity, right     Right finger index finger ulnar deviation starting.   Rheum consult ordered.          Relevant Orders    Adult Rheumatology  Referral       Other    Overweight with body mass index (BMI) of 27 to 27.9 in adult     Just lost her 18 year old granddaughter to cancer, now trying to focus back on healing and health         Anxiety state - Primary     UNCONTROLLED ANXIETY  Patient with uncontrolled anxiety and strong family history of lots of anxiety.   granddaughter 18 yr old passed away and her anxiety is worsening. Caring for parents, and her  who had a brain inury.     On zoloft 200mg po q day  Trazodone prn (sparingly for sleep) and wants to try adding buspar.   Also uses hydroxyzine 50mg 1-2 per day and very rarely uses ativan but never on the same day as the hydroxyzine.   Wants to try adding buspar because ssri plus buspar helped her other family members as anxiety runs in family.     Add buspar 5 mg po tid.   Collaborative mental health consult ordered I feel the amount of serotonin and sedating medication is very high and I told her I am uncomfortable but she wants to try it and will discontinue if getting sedated.  I recommend we consult psychiatry to see if there are better medication combinations/ safer alternatives. She is agreeable.            Relevant Medications    sertraline (ZOLOFT) 100 MG tablet    busPIRone (BUSPAR) 5 MG tablet    Other  "Relevant Orders    Adult Mental Health  Referral                    BMI:   Estimated body mass index is 27.01 kg/m  as calculated from the following:    Height as of this encounter: 1.651 m (5' 5\").    Weight as of this encounter: 73.6 kg (162 lb 4.8 oz).   Weight management plan: Discussed healthy diet and exercise guidelines    Patient Instructions   Return in about 4 weeks (around 3/18/2022) for follow up buspar start.       Return in about 4 weeks (around 3/18/2022) for follow up buspar start.    Carol Dickinson MD  [facility]        Shelly Kaiser is a 66 year old who presents for the following health issues    Uncontrolled anxiety, deformed finger, tinnitus, back pain.         Objective    /78 (BP Location: Left arm, Patient Position: Sitting, Cuff Size: Adult Regular)   Pulse 93   Temp 98  F (36.7  C) (Oral)   Resp 14   Ht 1.651 m (5' 5\")   Wt 73.6 kg (162 lb 4.8 oz)   LMP  (LMP Unknown)   SpO2 96%   BMI 27.01 kg/m    Body mass index is 27.01 kg/m .  Physical Exam  Constitutional:       Appearance: Normal appearance.   HENT:      Head: Normocephalic and atraumatic.   Cardiovascular:      Rate and Rhythm: Normal rate and regular rhythm.   Pulmonary:      Effort: Pulmonary effort is normal.   Musculoskeletal:         General: Normal range of motion.        Arms:       Cervical back: Normal range of motion and neck supple.      Comments: Trigger point noted over lower trap at the lower border of the scapula medially.   Neurological:      General: No focal deficit present.      Mental Status: She is alert and oriented to person, place, and time.                "

## 2022-02-19 PROBLEM — M81.0 OSTEOPOROSIS: Status: ACTIVE | Noted: 2021-08-04

## 2022-02-19 PROBLEM — M25.511 TRIGGER POINT OF SHOULDER REGION, RIGHT: Status: ACTIVE | Noted: 2022-02-19

## 2022-02-19 PROBLEM — E66.3 OVERWEIGHT WITH BODY MASS INDEX (BMI) OF 27 TO 27.9 IN ADULT: Status: ACTIVE | Noted: 2017-06-20

## 2022-02-19 PROBLEM — H93.13 TINNITUS, BILATERAL: Status: ACTIVE | Noted: 2022-02-19

## 2022-02-19 PROBLEM — K76.9 CHRONIC NONALCOHOLIC LIVER DISEASE: Status: ACTIVE | Noted: 2021-08-04

## 2022-02-19 ASSESSMENT — ANXIETY QUESTIONNAIRES: GAD7 TOTAL SCORE: 15

## 2022-02-19 NOTE — ASSESSMENT & PLAN NOTE
Right upper back pain,  trigger point noted on exam.  Offered trigger point injection which she will schedule if needed.   Physical therapy also recommended. Order placed.

## 2022-02-19 NOTE — ASSESSMENT & PLAN NOTE
UNCONTROLLED ANXIETY  Patient with uncontrolled anxiety and strong family history of lots of anxiety.   granddaughter 18 yr old passed away and her anxiety is worsening. Caring for parents, and her  who had a brain inury.     On zoloft 200mg po q day  Trazodone prn (sparingly for sleep) and wants to try adding buspar.   Also uses hydroxyzine 50mg 1-2 per day and very rarely uses ativan but never on the same day as the hydroxyzine.   Wants to try adding buspar because ssri plus buspar helped her other family members as anxiety runs in family.     Add buspar 5 mg po tid.   Collaborative mental health consult ordered I feel the amount of serotonin and sedating medication is very high and I told her I am uncomfortable but she wants to try it and will discontinue if getting sedated.  I recommend we consult psychiatry to see if there are better medication combinations/ safer alternatives. She is agreeable.

## 2022-02-21 ENCOUNTER — ANCILLARY PROCEDURE (OUTPATIENT)
Dept: MAMMOGRAPHY | Facility: CLINIC | Age: 67
End: 2022-02-21
Attending: FAMILY MEDICINE
Payer: COMMERCIAL

## 2022-02-21 DIAGNOSIS — Z12.31 VISIT FOR SCREENING MAMMOGRAM: ICD-10-CM

## 2022-02-21 PROCEDURE — 77067 SCR MAMMO BI INCL CAD: CPT

## 2022-03-15 DIAGNOSIS — F41.1 ANXIETY STATE: ICD-10-CM

## 2022-03-15 NOTE — TELEPHONE ENCOUNTER
"Routing refill request to provider for review/approval because:  Acute dosing, provider please review for continued use    Last Written Prescription Date:  2/18/22  Last Fill Quantity: 90,  # refills: 0   Last office visit provider:  2/18/22     Requested Prescriptions   Pending Prescriptions Disp Refills     busPIRone (BUSPAR) 5 MG tablet [Pharmacy Med Name: BUSPIRONE HCL 5 MG TABLET] 90 tablet 0     Sig: TAKE 1 TABLET BY MOUTH 3 TIMES DAILY.       Atypical Antidepressants Protocol Passed - 3/15/2022  7:31 AM        Passed - Recent (12 mo) or future (30 days) visit within the authorizing provider's specialty     Patient has had an office visit with the authorizing provider or a provider within the authorizing providers department within the previous 12 mos or has a future within next 30 days. See \"Patient Info\" tab in inbasket, or \"Choose Columns\" in Meds & Orders section of the refill encounter.              Passed - Medication active on med list        Passed - Patient is age 18 or older        Passed - No active pregnancy on record        Passed - No positive pregnancy test in past 12 mos             Bonnie Porter RN 03/15/22 12:57 PM  "

## 2022-03-16 ENCOUNTER — TRANSFERRED RECORDS (OUTPATIENT)
Dept: HEALTH INFORMATION MANAGEMENT | Facility: CLINIC | Age: 67
End: 2022-03-16
Payer: OTHER GOVERNMENT

## 2022-03-16 RX ORDER — BUSPIRONE HYDROCHLORIDE 5 MG/1
TABLET ORAL
Qty: 90 TABLET | Refills: 0 | Status: SHIPPED | OUTPATIENT
Start: 2022-03-16 | End: 2022-03-18

## 2022-03-18 ENCOUNTER — OFFICE VISIT (OUTPATIENT)
Dept: FAMILY MEDICINE | Facility: CLINIC | Age: 67
End: 2022-03-18
Payer: COMMERCIAL

## 2022-03-18 VITALS
SYSTOLIC BLOOD PRESSURE: 126 MMHG | RESPIRATION RATE: 14 BRPM | BODY MASS INDEX: 27.84 KG/M2 | DIASTOLIC BLOOD PRESSURE: 78 MMHG | HEIGHT: 65 IN | OXYGEN SATURATION: 96 % | HEART RATE: 68 BPM | WEIGHT: 167.1 LBS

## 2022-03-18 DIAGNOSIS — F41.1 ANXIETY STATE: ICD-10-CM

## 2022-03-18 DIAGNOSIS — M25.511 TRIGGER POINT OF SHOULDER REGION, RIGHT: ICD-10-CM

## 2022-03-18 PROCEDURE — 99213 OFFICE O/P EST LOW 20 MIN: CPT | Performed by: FAMILY MEDICINE

## 2022-03-18 RX ORDER — BUSPIRONE HYDROCHLORIDE 5 MG/1
5 TABLET ORAL 3 TIMES DAILY
Qty: 90 TABLET | Refills: 0 | Status: SHIPPED | OUTPATIENT
Start: 2022-03-18 | End: 2022-04-21

## 2022-03-18 ASSESSMENT — ANXIETY QUESTIONNAIRES
7. FEELING AFRAID AS IF SOMETHING AWFUL MIGHT HAPPEN: NOT AT ALL
4. TROUBLE RELAXING: SEVERAL DAYS
7. FEELING AFRAID AS IF SOMETHING AWFUL MIGHT HAPPEN: NOT AT ALL
GAD7 TOTAL SCORE: 5
GAD7 TOTAL SCORE: 5
3. WORRYING TOO MUCH ABOUT DIFFERENT THINGS: SEVERAL DAYS
5. BEING SO RESTLESS THAT IT IS HARD TO SIT STILL: NOT AT ALL
6. BECOMING EASILY ANNOYED OR IRRITABLE: SEVERAL DAYS
2. NOT BEING ABLE TO STOP OR CONTROL WORRYING: SEVERAL DAYS
1. FEELING NERVOUS, ANXIOUS, OR ON EDGE: SEVERAL DAYS

## 2022-03-18 NOTE — ASSESSMENT & PLAN NOTE
Continue current plan until appt with psychiatry 3/24/22.   buspar refilled for now.   Only takes trazodone once or twice a week.   Continues zoloft 200mg po q day

## 2022-03-18 NOTE — PROGRESS NOTES
Assessment & Plan   Problem List Items Addressed This Visit        Nervous and Auditory    Trigger point of shoulder region, right     Has physical therapy appt next week.             Other    Anxiety state     Continue current plan until appt with psychiatry 3/24/22.   buspar refilled for now.   Only takes trazodone once or twice a week.   Continues zoloft 200mg po q day         Relevant Medications    busPIRone (BUSPAR) 5 MG tablet             Return in about 2 months (around 5/18/2022) for annual physical.    Carol Dickinson MD  [facility]    Shelly Kaiser is a 67 year old who presents for the following health issues - mental health.     Only takes trazodone once or twice a week.   Continues zoloft 200mg po q day  Has used buspar 5mg tid and she doesn't feel that does anything.   She says she had tried effexor in the past and didn't like because it didn't work and maybe even made her 'more reactive'.     appt with psychiatry 3/24/22.     Husbands health is ailing.   It is the two month anniversary of her granddaughters death.   She is not sleeping well.   History of Present Illness       Mental Health Follow-up:  Patient presents to follow-up on Anxiety.    Patient's anxiety since last visit has been:  Better  The patient is having other symptoms associated with anxiety.  Any significant life events: grief or loss  Patient is feeling anxious or having panic attacks.  Patient has no concerns about alcohol or drug use.         Today's CAROL-7 Score: 5    She eats 2-3 servings of fruits and vegetables daily.She consumes 0 sweetened beverage(s) daily.She exercises with enough effort to increase her heart rate 20 to 29 minutes per day.  She exercises with enough effort to increase her heart rate 4 days per week.   She is taking medications regularly.           Objective    /78 (BP Location: Left arm, Patient Position: Sitting, Cuff Size: Adult Regular)   Pulse 68   Resp 14   Ht  "1.651 m (5' 5\")   Wt 75.8 kg (167 lb 1.6 oz)   LMP  (LMP Unknown)   SpO2 96%   BMI 27.81 kg/m    Body mass index is 27.81 kg/m .  Physical Exam  Constitutional:       Appearance: Normal appearance.   HENT:      Head: Normocephalic and atraumatic.   Cardiovascular:      Rate and Rhythm: Normal rate and regular rhythm.   Pulmonary:      Effort: Pulmonary effort is normal.   Musculoskeletal:         General: Normal range of motion.      Cervical back: Normal range of motion and neck supple.   Neurological:      General: No focal deficit present.      Mental Status: She is alert and oriented to person, place, and time.        "

## 2022-03-19 ASSESSMENT — ANXIETY QUESTIONNAIRES: GAD7 TOTAL SCORE: 5

## 2022-03-24 ENCOUNTER — HOSPITAL ENCOUNTER (OUTPATIENT)
Dept: PHYSICAL THERAPY | Facility: REHABILITATION | Age: 67
Discharge: HOME | End: 2022-03-24
Attending: FAMILY MEDICINE
Payer: COMMERCIAL

## 2022-03-24 DIAGNOSIS — M25.511 TRIGGER POINT OF SHOULDER REGION, RIGHT: ICD-10-CM

## 2022-03-24 DIAGNOSIS — R29.898 DECREASED RANGE OF MOTION OF NECK: ICD-10-CM

## 2022-03-24 DIAGNOSIS — M62.81 MUSCLE WEAKNESS (GENERALIZED): Primary | ICD-10-CM

## 2022-03-24 PROCEDURE — 97161 PT EVAL LOW COMPLEX 20 MIN: CPT | Mod: GP | Performed by: PHYSICAL THERAPIST

## 2022-03-24 PROCEDURE — 97110 THERAPEUTIC EXERCISES: CPT | Mod: GP | Performed by: PHYSICAL THERAPIST

## 2022-03-24 NOTE — PROGRESS NOTES
[facility]    OUTPATIENT PHYSICAL THERAPY ORTHOPEDIC EVALUATION  PLAN OF TREATMENT FOR OUTPATIENT REHABILITATION  (COMPLETE FOR INITIAL CLAIMS ONLY)  Patient's Last Name, First Name, M.I.  YOB: 1955  Awilda Pandya    Provider s Name:  [facility]   Medical Record No.  [id number]   Start of Care Date:  03/24/22   Onset Date:  01/28/22   Type:     _X__PT   ___OT   ___SLP Medical Diagnosis:  Trigger point of shoulder region, right M25.511      PT Diagnosis:  Acute R upper/mid back pain with decreased neck ROM and muscle weakness   Visits from SOC:  1      _________________________________________________________________________________  Plan of Treatment/Functional Goals:  ADL retraining, joint mobilization, manual therapy, neuromuscular re-education, ROM, strengthening, stretching           Goals     Goal Description: Pt will be able to lay on R side without increase in shoulder pain for improved quality of sleep in 90 days.  Target Date: 06/22/22       Goal Description: Pt will be able to sit with back against chair without increase in R shoulder blade pain for improved QOL in 90 days.  Target Date: 06/22/22      Therapy Frequency:  1 time/week (1x/every 2-3 weeks as needed up to 12 visits)  Predicted Duration of Therapy Intervention:  90 days    Katelyn Kessler, PT                 I CERTIFY THE NEED FOR THESE SERVICES FURNISHED UNDER        THIS PLAN OF TREATMENT AND WHILE UNDER MY CARE     (Physician co-signature of this document indicates review and certification of the therapy plan).                       Certification Date From:  03/24/22   Certification Date To:  06/22/22    Referring Provider:  Dr. Carol Dickinson    Initial Assessment        See Epic Evaluation Start of Care Date: 03/24/22 03/24/22 1100   General Information   Type of Visit Initial  OP Ortho PT Evaluation   Start of Care Date 22   Referring Physician Dr. Carol Dickinson   Patient/Family Goals Statement learn exercises for my shoulder area   Orders Evaluate and Treat   Date of Order 22   Certification Required? Yes   Medical Diagnosis Trigger point of shoulder region, right M25.511    Surgical/Medical history reviewed Yes   Precautions/Limitations no known precautions/limitations   General Information Comments Pt reports h/o L wrist fracture with surgery and h/o CRPS that finally improved after PT and cervical injections. Pt also has a  with PTSD and traumatic brain injury 2018, a granddaughter who just passed away this past year at 18 due to cancer, and pt also helps take care of her parents that are still living independently and have her brother living with them. Pt reports she has some family h/o anxiety and pt has been taking some meds for that as well.    Body Part(s)   Body Part(s) Shoulder   Presentation and Etiology   Pertinent history of current problem (include personal factors and/or comorbidities that impact the POC) Pt reports R shoulder pain started to bother about a week after her granddaughter's  service she had this sharp, shooting pain in the back of her shoulder blade. Pt has gotten a massage since the order for PT was initiated and feels like it only 10% left of irritation to the area. Pt reports if she takes a sleeping pill she can get increased shoulder pain if she has laid on her R side for a long time. Pt also feels like sitting against the back of a chair for too long also can increase pain. Pt reports she has also gained some weight so she did purchase new bras which feel like that helps a little too.    Impairments A. Pain;D. Decreased ROM;E. Decreased flexibility;F. Decreased strength and endurance   Functional Limitations perform activities of daily living   Symptom Location R shoulder blade   How/Where did it occur From insidious onset    Onset date of current episode/exacerbation 01/28/22   Chronicity New   Pain rating (0-10 point scale) Other   Pain rating comment 0-8/10   Pain quality A. Sharp   Frequency of pain/symptoms C. With activity   Pain/symptoms exacerbated by M. Other  (pressure)   Pain/symptoms eased by K. Other  (movement)   Progression of symptoms since onset: Improved   Prior Level of Function   Prior Level of Function-ADLs No pain with ADLs prior to pain episode   Fall Risk Screen   Fall screen completed by PT   Have you fallen 2 or more times in the past year? No   Have you fallen and had an injury in the past year? No   Is patient a fall risk? No   Abuse Screen (yes response referral indicated)   Feels Unsafe at Home or Work/School no   Feels Threatened by Someone no   Does Anyone Try to Keep You From Having Contact with Others or Doing Things Outside Your Home? no   Physical Signs of Abuse Present no   Functional Scales   Functional Scales Other   Other Scales  SPADI    Shoulder Objective Findings   Side (if bilateral, select both right and left) Right   Posture Forward head and rounded shoulders with increased thoracic kyphosis   Cervical Screen (ROM, quadrant) Cervical flex WFL with mild stretch into R side down to shoulder blade, ext WFL with awareness of spot on R, R rotation and L SB min limited with tightness into R shoulder blade spot, L rotation and R SB WNL without pain   Shoulder ROM Comment L shoulder ROM WFL with IR T6   Palpation Tender with increase tone R versus L at mid scap to inferior angle at rhomboids    Right Shoulder Flexion AROM WFL with mild tightness/soreness   Right Shoulder Abduction AROM WFL    Right Shoulder ER AROM WFL   Right Shoulder IR AROM T7   Right Shoulder Flexion Strength R 4/5, L 4+/5   Right Shoulder ER Strength B 5/5   Right Shoulder IR Strength B 5/5   Right Mid Trapezius Strength R 4/5 with tightness to inferior scap angle   Right Lower Trapezius Strength R 4/5 with tightness to  inferior scap angle   Planned Therapy Interventions   Planned Therapy Interventions ADL retraining;joint mobilization;manual therapy;neuromuscular re-education;ROM;strengthening;stretching   Clinical Impression   Criteria for Skilled Therapeutic Interventions Met yes, treatment indicated   PT Diagnosis Acute R upper/mid back pain with decreased neck ROM and muscle weakness   Clinical Presentation Stable/Uncomplicated   Clinical Decision Making (Complexity) Low complexity   Therapy Frequency 1 time/week  (1x/every 2-3 weeks as needed up to 12 visits)   Predicted Duration of Therapy Intervention (days/wks) 90 days   Risk & Benefits of therapy have been explained Yes   Patient, Family & other staff in agreement with plan of care Yes   Clinical Impression Comments Pt demo's signs and sx consistent with increased R upper quarter and neck tension with decreased neck ROM and decreased neck and scapular strength contributing to sensitivity with static position - especially with a load or pressure applied. Pt is great candidate for skilled PT services to decrease impairments and reach goals.    ORTHO GOALS   PT Ortho Eval Goals 1;2;3   Ortho Goal 1   Goal Description Pt will be able to lay on R side without increase in shoulder pain for improved quality of sleep in 90 days.   Target Date 06/22/22   Ortho Goal 2   Goal Description Pt will be able to sit with back against chair without increase in R shoulder blade pain for improved QOL in 90 days.   Target Date 06/22/22   Total Evaluation Time   PT Eval, Low Complexity Minutes (93764) 24   Therapy Certification   Certification date from 03/24/22   Certification date to 06/22/22   Medical Diagnosis Trigger point of shoulder region, right M25.511

## 2022-03-24 NOTE — DISCHARGE INSTRUCTIONS
Can try breathing technique to also help with stress levels or sleep  Double inhale - inhale through nose as deep as you can, hold x1 second and then inhale again as deep as you can and then exhale through mouth a big sigh      Two exercises to try        Stretch arm out in front and then slide back behind x5-10 seconds - try to take a deep breath on either end to help with stretch x3-5 reps 1-2x/day      Squeeze only as far together as feels good and NO hold x5-10 reps 1-2x/day

## 2022-03-31 ENCOUNTER — HOSPITAL ENCOUNTER (OUTPATIENT)
Dept: PHYSICAL THERAPY | Facility: REHABILITATION | Age: 67
Discharge: HOME | End: 2022-03-31
Payer: COMMERCIAL

## 2022-03-31 ENCOUNTER — OFFICE VISIT (OUTPATIENT)
Dept: AUDIOLOGY | Facility: CLINIC | Age: 67
End: 2022-03-31
Attending: FAMILY MEDICINE
Payer: COMMERCIAL

## 2022-03-31 ENCOUNTER — OFFICE VISIT (OUTPATIENT)
Dept: OTOLARYNGOLOGY | Facility: CLINIC | Age: 67
End: 2022-03-31
Attending: FAMILY MEDICINE
Payer: COMMERCIAL

## 2022-03-31 DIAGNOSIS — H90.3 SENSORINEURAL HEARING LOSS (SNHL) OF BOTH EARS: Primary | ICD-10-CM

## 2022-03-31 DIAGNOSIS — H93.13 TINNITUS, BILATERAL: ICD-10-CM

## 2022-03-31 DIAGNOSIS — M25.511 TRIGGER POINT OF SHOULDER REGION, RIGHT: Primary | ICD-10-CM

## 2022-03-31 DIAGNOSIS — R29.898 DECREASED RANGE OF MOTION OF NECK: ICD-10-CM

## 2022-03-31 DIAGNOSIS — M62.81 MUSCLE WEAKNESS (GENERALIZED): ICD-10-CM

## 2022-03-31 DIAGNOSIS — H90.3 SENSORINEURAL HEARING LOSS, BILATERAL: Primary | ICD-10-CM

## 2022-03-31 PROCEDURE — 97110 THERAPEUTIC EXERCISES: CPT | Mod: GP | Performed by: PHYSICAL THERAPIST

## 2022-03-31 PROCEDURE — 97535 SELF CARE MNGMENT TRAINING: CPT | Mod: GP | Performed by: PHYSICAL THERAPIST

## 2022-03-31 PROCEDURE — 99207 PR NO CHARGE LOS: CPT | Performed by: AUDIOLOGIST

## 2022-03-31 PROCEDURE — 92550 TYMPANOMETRY & REFLEX THRESH: CPT | Performed by: AUDIOLOGIST

## 2022-03-31 PROCEDURE — 99203 OFFICE O/P NEW LOW 30 MIN: CPT | Performed by: OTOLARYNGOLOGY

## 2022-03-31 PROCEDURE — 92557 COMPREHENSIVE HEARING TEST: CPT | Performed by: AUDIOLOGIST

## 2022-03-31 RX ORDER — QUETIAPINE FUMARATE 25 MG/1
TABLET, FILM COATED ORAL
COMMUNITY
Start: 2022-03-24 | End: 2023-05-23

## 2022-03-31 NOTE — DISCHARGE INSTRUCTIONS
Sleep Quality     - If able to get up to 30 minutes of exercise per day  - Sunlight during the day - if can be in the morning while the sun is rising - that can help stimulate circadian rhythms for falling asleep at night  - Meditation/breathing work up to 20 minutes per day  - Cool dark room  - Try to limit exposure to brighter lights - especially 11 pm to 4 am  - Magnesium in a supplement  - Cooler/cold shower - maybe at night    Exercise to help decrease fall risk and improve body strength  Sit to stand    Trying to do 10-15 reps in a row 3-5x/week    When doing your morning stretches try to incorporate these into it  Snow alma    x5 reps      Stretch 1 arm down to the side - [place] it back up and then stretch other arm down and then continue with your regular normal stretches      x10-20 reps 3x/week - START this exercise when you can do 20 reps of shoulder blade squeezes without difficulty

## 2022-03-31 NOTE — PROGRESS NOTES
AUDIOLOGY REPORT    SUMMARY: Audiology visit completed. See audiogram for results. Abuse screening not completed due to same day appt with ENT clinic, where this is addressed.      RECOMMENDATIONS: Follow-up with ENT.      Allen Hopson, [facility]  [place] Licensed Audiologist [id number]

## 2022-03-31 NOTE — PROGRESS NOTES
HPI: This patient is a 66yo F who presents for evaluation of hearing at the request of Dr. Dickinson. There is left>right, non-pulsatile tinnitus, most noticeable when it is quiet. This has been present for years. There has been a gradual loss of hearing in both ears as well. Denies otalgia, otorrhea, vertigo, and other major medical issues. Has been around moderate noise and has a presbycusis family history. Also has a personal history of TMJ issues, not treated in several years.     Past medical history, surgical history, social history, family history, medications, and allergies have been reviewed with the patient and are documented above.    Review of Systems: a 10-system review was performed. Pertinent positives are noted in the HPI and on a separate scanned document in the chart.    PHYSICAL EXAMINATION:  GEN: no acute distress, normocephalic  EYES: extraocular movements are intact, pupils are equal and round. Sclera clear.   EARS: auricles are normally formed. The external auditory canals are clear with minimal to no cerumen. Tympanic membranes are intact bilaterally with no signs of infection, effusion, retractions, or perforations.  NOSE: anterior nares are patent.   OC/OP: clear, dentition is in good repair but with a lot of restorations making assessment of the dentition for clenching/grinding limited. The tongue and palate are fully mobile and symmetric. No masses or lesions.  NECK: soft and supple. No lymphadenopathy or masses. Airway is midline. + bilateral TMJ tenderness.  NEURO: CN VII and XII symmetric. alert and oriented. No spontaneous nystagmus. Gait is normal.  PULM: breathing comfortably on room air, normal chest expansion with respiration  CARDS: no cyanosis or clubbing, normal carotid pulses    AUDIOGRAM: symmetric mild to moderate HFSNHL, type a tymps, 100% WRS    MEDICAL DECISION-MAKING: This patient is a 66yo F with HF sensorineural hearing loss and resultant tinnitus. Discussed hearing  protection and masking techniques. Medically clear for hearing aids should the patient desire them. Also has some degree of TMJ, likely making the tinnitus a bit louder.

## 2022-03-31 NOTE — LETTER
3/31/2022         RE: Awilda Pandya  [address]  [facility] [address]        Dear Colleague,    Thank you for referring your patient, Awilda Pandya, to the [facility]. Please see a copy of my visit note below.    HPI: This patient is a 66yo F who presents for evaluation of hearing at the request of Dr. Dickinson. There is left>right, non-pulsatile tinnitus, most noticeable when it is quiet. This has been present for years. There has been a gradual loss of hearing in both ears as well. Denies otalgia, otorrhea, vertigo, and other major medical issues. Has been around moderate noise and has a presbycusis family history. Also has a personal history of TMJ issues, not treated in several years.     Past medical history, surgical history, social history, family history, medications, and allergies have been reviewed with the patient and are documented above.    Review of Systems: a 10-system review was performed. Pertinent positives are noted in the HPI and on a separate scanned document in the chart.    PHYSICAL EXAMINATION:  GEN: no acute distress, normocephalic  EYES: extraocular movements are intact, pupils are equal and round. Sclera clear.   EARS: auricles are normally formed. The external auditory canals are clear with minimal to no cerumen. Tympanic membranes are intact bilaterally with no signs of infection, effusion, retractions, or perforations.  NOSE: anterior nares are patent.   OC/OP: clear, dentition is in good repair but with a lot of restorations making assessment of the dentition for clenching/grinding limited. The tongue and palate are fully mobile and symmetric. No masses or lesions.  NECK: soft and supple. No lymphadenopathy or masses. Airway is midline. + bilateral TMJ tenderness.  NEURO: CN VII and XII symmetric. alert and oriented. No spontaneous nystagmus. Gait is normal.  PULM: breathing comfortably on room air, normal chest expansion with  respiration  CARDS: no cyanosis or clubbing, normal carotid pulses    AUDIOGRAM: symmetric mild to moderate HFSNHL, type a tymps, 100% WRS    MEDICAL DECISION-MAKING: This patient is a 66yo F with HF sensorineural hearing loss and resultant tinnitus. Discussed hearing protection and masking techniques. Medically clear for hearing aids should the patient desire them. Also has some degree of TMJ, likely making the tinnitus a bit louder.         Again, thank you for allowing me to participate in the care of your patient.        Sincerely,        Annabelle Kong MD

## 2022-04-04 NOTE — ADDENDUM NOTE
Encounter addended by: Katelyn Kessler, PT on: 4/4/2022 2:30 PM   Actions taken: Flowsheet accepted, Charge Capture section accepted, Episode resolved

## 2022-04-14 DIAGNOSIS — F41.1 GENERALIZED ANXIETY DISORDER: ICD-10-CM

## 2022-04-15 DIAGNOSIS — F41.1 ANXIETY STATE: ICD-10-CM

## 2022-04-17 NOTE — TELEPHONE ENCOUNTER
"Last Written Prescription Date:  3/18/22  Last Fill Quantity: 90,  # refills: 0   Last office visit provider:  3/18/22    Routing refill request to provider for review/approval because:  Last OV note says: \"Has used buspar 5mg tid and she doesn't feel that does anything. \"        Requested Prescriptions   Pending Prescriptions Disp Refills     busPIRone (BUSPAR) 5 MG tablet [Pharmacy Med Name: BUSPIRONE HCL 5 MG TABLET] 90 tablet 0     Sig: TAKE 1 TABLET BY MOUTH THREE TIMES A DAY       Atypical Antidepressants Protocol Passed - 4/15/2022  8:31 AM        Passed - Recent (12 mo) or future (30 days) visit within the authorizing provider's specialty     Patient has had an office visit with the authorizing provider or a provider within the authorizing providers department within the previous 12 mos or has a future within next 30 days. See \"Patient Info\" tab in inbasket, or \"Choose Columns\" in Meds & Orders section of the refill encounter.              Passed - Medication active on med list        Passed - Patient is age 18 or older        Passed - No active pregnancy on record        Passed - No positive pregnancy test in past 12 mos             Nan Fitch RN 04/17/22 3:52 PM  "

## 2022-04-17 NOTE — TELEPHONE ENCOUNTER
"Outpatient Medication Detail     Disp Refills Start End XUAN   sertraline (ZOLOFT) 100 MG tablet 180 tablet 3 6/15/2021 2021 --   Sig - Route: Take 2 tablets (200 mg total) by mouth daily. - Oral   Sent to pharmacy as: sertraline 100 mg tablet (Zoloft)   E-Prescribing Status: Receipt confirmed by pharmacy (6/15/2021  8:19 AM CDT)       sertraline (ZOLOFT) 100 MG tablet [[id number]]    Electronically signed by: Carol Dickinson MD on 06/15/21 0819 Status:    Ordering user: Carol Dickinson MD 06/15/21 0819 Authorized by: Carol Dickinosn MD   Frequency: DAILY 06/15/21 - 90  days   Diagnoses  Anxiety state [F41.1]     Routing refill request to provider for review/approval because:   script    Last office visit provider:  3/18/22     Requested Prescriptions   Pending Prescriptions Disp Refills     sertraline (ZOLOFT) 100 MG tablet [Pharmacy Med Name: SERTRALINE  MG TABLET] 90 tablet 3     Sig: TAKE 1 TABLET BY MOUTH EVERY DAY       SSRIs Protocol Passed - 2022  8:35 AM        Passed - Recent (12 mo) or future (30 days) visit within the authorizing provider's specialty     Patient has had an office visit with the authorizing provider or a provider within the authorizing providers department within the previous 12 mos or has a future within next 30 days. See \"Patient Info\" tab in inbasket, or \"Choose Columns\" in Meds & Orders section of the refill encounter.              Passed - Medication is active on med list        Passed - Patient is age 18 or older        Passed - No active pregnancy on record        Passed - No positive pregnancy test in last 12 months             Franko Mckeon RN 22 8:35 AM  "

## 2022-04-21 RX ORDER — SERTRALINE HYDROCHLORIDE 100 MG/1
200 TABLET, FILM COATED ORAL DAILY
Qty: 180 TABLET | Refills: 0 | Status: SHIPPED | OUTPATIENT
Start: 2022-04-21 | End: 2022-08-30

## 2022-04-21 RX ORDER — BUSPIRONE HYDROCHLORIDE 5 MG/1
TABLET ORAL
Qty: 90 TABLET | Refills: 0 | Status: SHIPPED | OUTPATIENT
Start: 2022-04-21 | End: 2023-05-23

## 2022-06-01 DIAGNOSIS — F41.1 GENERALIZED ANXIETY DISORDER: ICD-10-CM

## 2022-06-03 NOTE — TELEPHONE ENCOUNTER
"Outpatient Medication Detail     Disp Refills Start End XUAN   sertraline (ZOLOFT) 100 MG tablet 180 tablet 3 6/15/2021 2021 --   Sig - Route: Take 2 tablets (200 mg total) by mouth daily. - Oral   Sent to pharmacy as: sertraline 100 mg tablet (Zoloft)   E-Prescribing Status: Receipt confirmed by pharmacy (6/15/2021  8:19 AM CDT)       sertraline (ZOLOFT) 100 MG tablet [[id number]]    Electronically signed by: Carol Dickinson MD on 06/15/21 0819 Status:    Ordering user: Carol Dickinson MD 06/15/21 0819 Authorized by: Carol Dickinson MD   Frequency: DAILY 06/15/21 - 90  days   Diagnoses  Anxiety state [F41.1]     Routing refill request to provider for review/approval because:  System reports patient not taking medication.   script    Last office visit provider:  3/18/22     Requested Prescriptions   Pending Prescriptions Disp Refills     sertraline (ZOLOFT) 50 MG tablet [Pharmacy Med Name: SERTRALINE HCL 50 MG TABLET] 90 tablet 3     Sig: TAKE 1 TABLET BY MOUTH EVERY DAY       SSRIs Protocol Passed - 6/3/2022  7:19 AM        Passed - Recent (12 mo) or future (30 days) visit within the authorizing provider's specialty     Patient has had an office visit with the authorizing provider or a provider within the authorizing providers department within the previous 12 mos or has a future within next 30 days. See \"Patient Info\" tab in inbasket, or \"Choose Columns\" in Meds & Orders section of the refill encounter.              Passed - Medication is active on med list        Passed - Patient is age 18 or older        Passed - No active pregnancy on record        Passed - No positive pregnancy test in last 12 months             Franko Mckeon RN 22 7:20 AM  "

## 2022-10-01 ENCOUNTER — HEALTH MAINTENANCE LETTER (OUTPATIENT)
Age: 67
End: 2022-10-01

## 2022-10-06 ENCOUNTER — TELEPHONE (OUTPATIENT)
Dept: FAMILY MEDICINE | Facility: CLINIC | Age: 67
End: 2022-10-06

## 2022-10-06 DIAGNOSIS — G25.81 RESTLESS LEGS SYNDROME: ICD-10-CM

## 2022-10-06 NOTE — TELEPHONE ENCOUNTER
"Routing refill request to provider for review/approval because:  Labs not current:  Multiple  Patient needs to be seen because it has been more than 6 months since last office visit.    Last Written Prescription Date:  8/4/21  Last Fill Quantity: 90,  # refills: 3   Last office visit provider:  3/18/22     Requested Prescriptions   Pending Prescriptions Disp Refills     pramipexole (MIRAPEX) 0.25 MG tablet [Pharmacy Med Name: PRAMIPEXOLE 0.25 MG TABLET] 90 tablet 3     Sig: TAKE 1 TABLET (0.25 MG) BY MOUTH AT BEDTIME       Antiparkinson's Agents Protocol Failed - 10/6/2022 10:51 AM        Failed - CBC on record in past 12 months     Recent Labs   Lab Test 11/26/19  1508   WBC 7.2   RBC 4.21   HGB 12.8   HCT 37.4                    Failed - ALT on record in past 12 months         Recent Labs   Lab Test 08/04/21  0921   ALT 21             Failed - Serum Creatinine on file in past 12 months     Recent Labs   Lab Test 08/04/21  0921   CR 0.81       Ok to refill medication if creatinine is low          Failed - Recent (6 mo) or future (30 days) visit within the authorizing provider's specialty     Patient had office visit in the last 6 months or has a visit in the next 30 days with authorizing provider or within the authorizing provider's specialty.  See \"Patient Info\" tab in inbasket, or \"Choose Columns\" in Meds & Orders section of the refill encounter.            Passed - Blood pressure under 140/90 in past 12 months     BP Readings from Last 3 Encounters:   03/18/22 126/78   02/18/22 130/78   08/04/21 136/82                 Passed - Medication is active on med list        Passed - Patient is age 18 or older        Passed - No active pregnancy on record        Passed - No positive pregnancy test in the past 12 months             Franko Mckeon RN 10/06/22 10:52 AM  "

## 2022-10-06 NOTE — TELEPHONE ENCOUNTER
Left message to call back for: Patient  LM x1  Information to relay to patient: see below     Please help patient schedule Annual Wellness with Dr. Dickinson upon return call, transfer back to provider pool for bridge after scheduling.

## 2022-10-06 NOTE — TELEPHONE ENCOUNTER
See refill encounter from today 10/06. And help schedule.      .  Left message to call back for: Patient     Information to relay to patient: Annual Wellness, medication check and other care gaps are overdue.    Please help patient schedule Annual Wellness with Dr. Dickinson upon return call.

## 2022-10-11 RX ORDER — PRAMIPEXOLE DIHYDROCHLORIDE 0.25 MG/1
0.25 TABLET ORAL AT BEDTIME
Qty: 90 TABLET | Refills: 0 | Status: SHIPPED | OUTPATIENT
Start: 2022-10-11 | End: 2023-01-09

## 2022-10-11 NOTE — TELEPHONE ENCOUNTER
"Routing refill request to provider for review/approval because:  Labs not current:  Multiple    Last Written Prescription Date:  8/4/21  Last Fill Quantity: 90,  # refills: 3   Last office visit provider:  3/18/22     Requested Prescriptions   Pending Prescriptions Disp Refills     pramipexole (MIRAPEX) 0.25 MG tablet [Pharmacy Med Name: PRAMIPEXOLE 0.25 MG TABLET] 90 tablet 0     Sig: Take 1 tablet (0.25 mg) by mouth At Bedtime       Antiparkinson's Agents Protocol Failed - 10/11/2022 10:20 AM        Failed - CBC on record in past 12 months     Recent Labs   Lab Test 11/26/19  1508   WBC 7.2   RBC 4.21   HGB 12.8   HCT 37.4                    Failed - ALT on record in past 12 months         Recent Labs   Lab Test 08/04/21  0921   ALT 21             Failed - Serum Creatinine on file in past 12 months     Recent Labs   Lab Test 08/04/21  0921   CR 0.81       Ok to refill medication if creatinine is low          Failed - Recent (6 mo) or future (30 days) visit within the authorizing provider's specialty     Patient had office visit in the last 6 months or has a visit in the next 30 days with authorizing provider or within the authorizing provider's specialty.  See \"Patient Info\" tab in inbasket, or \"Choose Columns\" in Meds & Orders section of the refill encounter.            Passed - Blood pressure under 140/90 in past 12 months     BP Readings from Last 3 Encounters:   03/18/22 126/78   02/18/22 130/78   08/04/21 136/82                 Passed - Medication is active on med list        Passed - Patient is age 18 or older        Passed - No active pregnancy on record        Passed - No positive pregnancy test in the past 12 months             Franko Mckeon RN 10/11/22 10:20 AM  "

## 2022-10-28 ENCOUNTER — TELEPHONE (OUTPATIENT)
Dept: NURSING | Facility: CLINIC | Age: 67
End: 2022-10-28

## 2022-10-28 DIAGNOSIS — M81.0 SENILE OSTEOPOROSIS: ICD-10-CM

## 2022-10-28 RX ORDER — ALENDRONATE SODIUM 70 MG/1
TABLET ORAL
Qty: 12 TABLET | Refills: 3 | Status: SHIPPED | OUTPATIENT
Start: 2022-10-28 | End: 2022-11-23

## 2022-10-28 NOTE — TELEPHONE ENCOUNTER
"Routing refill request to provider for review/approval because:  Labs not current:  Cr    Last Written Prescription Date:  10/19/2021  Last Fill Quantity: 12,  # refills: 3   Last office visit provider:  03/18/22     Requested Prescriptions   Pending Prescriptions Disp Refills     alendronate (FOSAMAX) 70 MG tablet 12 tablet 3     Sig: [ALENDRONATE (FOSAMAX) 70 MG TABLET] TAKE 1 TABLET BY MOUTH EVERY 7 DAYS IN THE AM WITH FULL GLASS OF WATER ON EMPTY STOMACH       Bisphosphonates Failed - 10/28/2022  4:13 PM        Failed - Normal serum creatinine on file within past 12 months     Recent Labs   Lab Test 08/04/21  0921   CR 0.81       Ok to refill medication if creatinine is low          Passed - Recent (12 mo) or future (30 days) visit within the authorizing provider's specialty     Patient has had an office visit with the authorizing provider or a provider within the authorizing providers department within the previous 12 mos or has a future within next 30 days. See \"Patient Info\" tab in inbasket, or \"Choose Columns\" in Meds & Orders section of the refill encounter.              Passed - Dexa on file within past 2 years     Please review last Dexa result.           Passed - Medication is active on med list        Passed - Patient is age 18 or older             Almas Goldstein RN 10/28/22 4:14 PM  "

## 2022-11-23 ENCOUNTER — OFFICE VISIT (OUTPATIENT)
Dept: INTERNAL MEDICINE | Facility: CLINIC | Age: 67
End: 2022-11-23
Payer: COMMERCIAL

## 2022-11-23 VITALS
HEART RATE: 74 BPM | OXYGEN SATURATION: 97 % | SYSTOLIC BLOOD PRESSURE: 138 MMHG | RESPIRATION RATE: 16 BRPM | TEMPERATURE: 97.9 F | HEIGHT: 65 IN | WEIGHT: 174.4 LBS | BODY MASS INDEX: 29.06 KG/M2 | DIASTOLIC BLOOD PRESSURE: 88 MMHG

## 2022-11-23 DIAGNOSIS — F41.1 ANXIETY STATE: ICD-10-CM

## 2022-11-23 DIAGNOSIS — M81.0 SENILE OSTEOPOROSIS: Primary | ICD-10-CM

## 2022-11-23 PROCEDURE — 99213 OFFICE O/P EST LOW 20 MIN: CPT | Performed by: INTERNAL MEDICINE

## 2022-11-23 RX ORDER — BUPROPION HYDROCHLORIDE 150 MG/1
150 TABLET, EXTENDED RELEASE ORAL 2 TIMES DAILY
COMMUNITY

## 2022-11-23 RX ORDER — ALENDRONATE SODIUM 70 MG/1
TABLET ORAL
Qty: 12 TABLET | Refills: 3 | Status: SHIPPED | OUTPATIENT
Start: 2022-11-23 | End: 2023-12-19

## 2022-11-23 NOTE — PATIENT INSTRUCTIONS
Continue Fosamax for one more year.  Schedule DXA scan in [place] in October 2023 and visit [facility] after that.

## 2022-11-24 NOTE — PROGRESS NOTES
"  Assessment & Plan     Senile osteoporosis  DXA in 2019 with T-score -2.5 on L1-L2. Fosamax started 10/2019 and she is tolerating it well.  History of fractures: left wrist fracture in 2018, right fibula fracture 10 years ago      FH: osteoporosis - mother, M-GM, M-Aunts. Her mother had 5 compression vertebral fractures and pelvic fracture.   Kidney stones and lupus - daughter and GD.    DXA 9/2021:  1. The spine bone density L1-L4 with T-score -1.2.  2. Femoral bone densities show left femoral neck T- score -1.6 and right femoral neck T-score -1.0.  3. Trabecular bone score indicates moderate trabecular bone architecture.        66 year old female with LOW BONE DENSITY (OSTEOPENIA) and MODERATE fracture risk, adjusted for the TBS, with major osteoporotic fracture risk 15.6% and hip fracture risk 1.8%. She is currently receiving treatment with bisphosphonates.    We will continue Fosamax for one more year and repeat DXA in 9/2023. Possible \"drug holiday\" next year. Supplements and labs reviewed.    - DX Hip/Pelvis/Spine; Future  - alendronate (FOSAMAX) 70 MG tablet; [ALENDRONATE (FOSAMAX) 70 MG TABLET] TAKE 1 TABLET BY MOUTH EVERY 7 DAYS IN THE AM WITH FULL GLASS OF WATER ON EMPTY STOMACH    Anxiety state  A lot of stress in her family with loss of her granddaughter, taking care of her , and elderly parents. Has anxiety and trouble sleeping. Sertraline recently stopped and now on Buspar and bupropione, and Seroquel for sleep. Seeing the therapist. Self care and meditation discussed.                 BMI:   Estimated body mass index is 29.47 kg/m  as calculated from the following:    Height as of this encounter: 1.638 m (5' 4.5\").    Weight as of this encounter: 79.1 kg (174 lb 6.4 oz).           Return in about 1 year (around 11/23/2023) for Follow up, osteoporosis.    Mauro Mckeon MD  [facility]    Shelly Kaiser is a 67 year old, presenting for the following health " "issues:  Osteoporosis (Osteo F/U)      HPI           Review of Systems         Objective    /88   Pulse 74   Temp 97.9  F (36.6  C) (Oral)   Resp 16   Ht 1.638 m (5' 4.5\")   Wt 79.1 kg (174 lb 6.4 oz)   LMP  (LMP Unknown)   SpO2 97%   BMI 29.47 kg/m    Body mass index is 29.47 kg/m .  Physical Exam                       "

## 2022-12-02 ENCOUNTER — IMMUNIZATION (OUTPATIENT)
Dept: FAMILY MEDICINE | Facility: CLINIC | Age: 67
End: 2022-12-02
Payer: COMMERCIAL

## 2022-12-02 PROCEDURE — 90662 IIV NO PRSV INCREASED AG IM: CPT

## 2022-12-02 PROCEDURE — G0008 ADMIN INFLUENZA VIRUS VAC: HCPCS

## 2023-01-07 DIAGNOSIS — G25.81 RESTLESS LEGS SYNDROME: ICD-10-CM

## 2023-01-09 RX ORDER — PRAMIPEXOLE DIHYDROCHLORIDE 0.25 MG/1
0.25 TABLET ORAL AT BEDTIME
Qty: 90 TABLET | Refills: 0 | Status: SHIPPED | OUTPATIENT
Start: 2023-01-09 | End: 2023-04-12

## 2023-01-09 NOTE — TELEPHONE ENCOUNTER
"Routing refill request to provider for review/approval because:  Labs not current:  Multiple    Last Written Prescription Date:  10/11/22  Last Fill Quantity: 90,  # refills: 0   Last office visit provider:  11/23/22     Requested Prescriptions   Pending Prescriptions Disp Refills     pramipexole (MIRAPEX) 0.25 MG tablet [Pharmacy Med Name: PRAMIPEXOLE 0.25 MG TABLET] 90 tablet 0     Sig: TAKE 1 TABLET BY MOUTH AT BEDTIME       Antiparkinson's Agents Protocol Failed - 1/9/2023  2:50 PM        Failed - CBC on record in past 12 months     Recent Labs   Lab Test 11/26/19  1508   WBC 7.2   RBC 4.21   HGB 12.8   HCT 37.4                    Failed - ALT on record in past 12 months         Recent Labs   Lab Test 08/04/21  0921   ALT 21             Failed - Serum Creatinine on file in past 12 months     Recent Labs   Lab Test 08/04/21  0921   CR 0.81       Ok to refill medication if creatinine is low          Passed - Blood pressure under 140/90 in past 12 months     BP Readings from Last 3 Encounters:   11/23/22 138/88   03/18/22 126/78   02/18/22 130/78                 Passed - Medication is active on med list        Passed - Patient is age 18 or older        Passed - No active pregnancy on record        Passed - No positive pregnancy test in the past 12 months        Passed - Recent (6 mo) or future (30 days) visit within the authorizing provider's specialty     Patient had office visit in the last 6 months or has a visit in the next 30 days with authorizing provider or within the authorizing provider's specialty.  See \"Patient Info\" tab in inbasket, or \"Choose Columns\" in Meds & Orders section of the refill encounter.                 Franko Mckeon RN 01/09/23 2:51 PM  "

## 2023-03-01 ENCOUNTER — ANCILLARY PROCEDURE (OUTPATIENT)
Dept: MAMMOGRAPHY | Facility: CLINIC | Age: 68
End: 2023-03-01
Attending: FAMILY MEDICINE
Payer: COMMERCIAL

## 2023-03-01 DIAGNOSIS — Z12.31 VISIT FOR SCREENING MAMMOGRAM: ICD-10-CM

## 2023-03-01 PROCEDURE — 77067 SCR MAMMO BI INCL CAD: CPT

## 2023-04-11 DIAGNOSIS — G25.81 RESTLESS LEGS SYNDROME: ICD-10-CM

## 2023-04-11 NOTE — TELEPHONE ENCOUNTER
"Routing refill request to provider for review/approval because:  Labs not current:  CBC, ALT, Cr    Last Written Prescription Date: 1/9/23  Last Fill Quantity: 90, # refills: 0  Last office visit provider: Has appointment coming up on 5/23/23    Requested Prescriptions   Pending Prescriptions Disp Refills     pramipexole (MIRAPEX) 0.25 MG tablet [Pharmacy Med Name: PRAMIPEXOLE 0.25 MG TABLET] 90 tablet 0     Sig: TAKE 1 TABLET BY MOUTH AT BEDTIME       Antiparkinson's Agents Protocol Failed - 4/11/2023  7:31 AM        Failed - CBC on record in past 12 months     Recent Labs   Lab Test 11/26/19  1508   WBC 7.2   RBC 4.21   HGB 12.8   HCT 37.4                    Failed - ALT on record in past 12 months         Recent Labs   Lab Test 08/04/21  0921   ALT 21             Failed - Serum Creatinine on file in past 12 months     Recent Labs   Lab Test 08/04/21  0921   CR 0.81       Ok to refill medication if creatinine is low          Failed - Recent (6 mo) or future (30 days) visit within the authorizing provider's specialty     Patient had office visit in the last 6 months or has a visit in the next 30 days with authorizing provider or within the authorizing provider's specialty.  See \"Patient Info\" tab in inbasket, or \"Choose Columns\" in Meds & Orders section of the refill encounter.            Passed - Blood pressure under 140/90 in past 12 months     BP Readings from Last 3 Encounters:   11/23/22 138/88   03/18/22 126/78   02/18/22 130/78                 Passed - Medication is active on med list        Passed - Patient is age 18 or older        Passed - No active pregnancy on record        Passed - No positive pregnancy test in the past 12 months             Tierney Barkley, RN 04/11/23 1:57 PM  "

## 2023-04-12 RX ORDER — PRAMIPEXOLE DIHYDROCHLORIDE 0.25 MG/1
TABLET ORAL
Qty: 90 TABLET | Refills: 0 | Status: SHIPPED | OUTPATIENT
Start: 2023-04-12 | End: 2023-05-23

## 2023-05-23 ENCOUNTER — OFFICE VISIT (OUTPATIENT)
Dept: FAMILY MEDICINE | Facility: CLINIC | Age: 68
End: 2023-05-23
Payer: COMMERCIAL

## 2023-05-23 VITALS
SYSTOLIC BLOOD PRESSURE: 147 MMHG | HEIGHT: 64 IN | BODY MASS INDEX: 28.17 KG/M2 | OXYGEN SATURATION: 98 % | DIASTOLIC BLOOD PRESSURE: 84 MMHG | HEART RATE: 70 BPM | WEIGHT: 165 LBS

## 2023-05-23 DIAGNOSIS — G47.00 INSOMNIA, UNSPECIFIED TYPE: ICD-10-CM

## 2023-05-23 DIAGNOSIS — H93.13 TINNITUS, BILATERAL: ICD-10-CM

## 2023-05-23 DIAGNOSIS — Z23 NEED FOR SHINGLES VACCINE: ICD-10-CM

## 2023-05-23 DIAGNOSIS — C44.91 BASAL CELL CARCINOMA (BCC), UNSPECIFIED SITE: ICD-10-CM

## 2023-05-23 DIAGNOSIS — K57.30 DIVERTICULOSIS OF LARGE INTESTINE WITHOUT HEMORRHAGE: ICD-10-CM

## 2023-05-23 DIAGNOSIS — K76.0 HEPATIC STEATOSIS: ICD-10-CM

## 2023-05-23 DIAGNOSIS — Z00.00 ENCOUNTER FOR MEDICARE ANNUAL WELLNESS EXAM: Primary | ICD-10-CM

## 2023-05-23 DIAGNOSIS — E78.5 DYSLIPIDEMIA: ICD-10-CM

## 2023-05-23 DIAGNOSIS — J30.9 ALLERGIC RHINITIS, UNSPECIFIED SEASONALITY, UNSPECIFIED TRIGGER: ICD-10-CM

## 2023-05-23 DIAGNOSIS — R03.0 ELEVATED BLOOD PRESSURE READING WITHOUT DIAGNOSIS OF HYPERTENSION: ICD-10-CM

## 2023-05-23 DIAGNOSIS — F50.819 BINGE EATING DISORDER: ICD-10-CM

## 2023-05-23 DIAGNOSIS — M25.511 TRIGGER POINT OF SHOULDER REGION, RIGHT: ICD-10-CM

## 2023-05-23 DIAGNOSIS — E66.3 OVERWEIGHT WITH BODY MASS INDEX (BMI) OF 28 TO 28.9 IN ADULT: ICD-10-CM

## 2023-05-23 DIAGNOSIS — Z85.828 HISTORY OF BASAL CELL CARCINOMA: ICD-10-CM

## 2023-05-23 DIAGNOSIS — Z90.710 S/P VAGINAL HYSTERECTOMY: ICD-10-CM

## 2023-05-23 DIAGNOSIS — Z98.890 H/O COLONOSCOPY: ICD-10-CM

## 2023-05-23 DIAGNOSIS — M20.001 FINGER DEFORMITY, RIGHT: ICD-10-CM

## 2023-05-23 DIAGNOSIS — F41.1 ANXIETY STATE: ICD-10-CM

## 2023-05-23 DIAGNOSIS — M81.0 SENILE OSTEOPOROSIS: ICD-10-CM

## 2023-05-23 DIAGNOSIS — Z23 IMMUNIZATION DUE: ICD-10-CM

## 2023-05-23 DIAGNOSIS — G25.81 RESTLESS LEGS SYNDROME: ICD-10-CM

## 2023-05-23 DIAGNOSIS — Z00.00 ENCOUNTER FOR PREVENTIVE CARE: ICD-10-CM

## 2023-05-23 DIAGNOSIS — E04.2 MULTIPLE THYROID NODULES: ICD-10-CM

## 2023-05-23 DIAGNOSIS — Z13.0 SCREENING, ANEMIA, DEFICIENCY, IRON: ICD-10-CM

## 2023-05-23 LAB
ALBUMIN SERPL BCG-MCNC: 4.5 G/DL (ref 3.5–5.2)
ALP SERPL-CCNC: 66 U/L (ref 35–104)
ALT SERPL W P-5'-P-CCNC: 9 U/L (ref 10–35)
ANION GAP SERPL CALCULATED.3IONS-SCNC: 13 MMOL/L (ref 7–15)
AST SERPL W P-5'-P-CCNC: 27 U/L (ref 10–35)
BILIRUB SERPL-MCNC: 0.4 MG/DL
BUN SERPL-MCNC: 9 MG/DL (ref 8–23)
CALCIUM SERPL-MCNC: 9.7 MG/DL (ref 8.8–10.2)
CHLORIDE SERPL-SCNC: 105 MMOL/L (ref 98–107)
CHOLEST SERPL-MCNC: 255 MG/DL
CREAT SERPL-MCNC: 0.86 MG/DL (ref 0.51–0.95)
DEPRECATED HCO3 PLAS-SCNC: 22 MMOL/L (ref 22–29)
ERYTHROCYTE [DISTWIDTH] IN BLOOD BY AUTOMATED COUNT: 12.9 % (ref 10–15)
GFR SERPL CREATININE-BSD FRML MDRD: 74 ML/MIN/1.73M2
GLUCOSE SERPL-MCNC: 90 MG/DL (ref 70–99)
HCT VFR BLD AUTO: 39.1 % (ref 35–47)
HDLC SERPL-MCNC: 74 MG/DL
HGB BLD-MCNC: 13.3 G/DL (ref 11.7–15.7)
LDLC SERPL CALC-MCNC: 169 MG/DL
MCH RBC QN AUTO: 29.5 PG (ref 26.5–33)
MCHC RBC AUTO-ENTMCNC: 34 G/DL (ref 31.5–36.5)
MCV RBC AUTO: 87 FL (ref 78–100)
NONHDLC SERPL-MCNC: 181 MG/DL
PLATELET # BLD AUTO: 280 10E3/UL (ref 150–450)
POTASSIUM SERPL-SCNC: 3.8 MMOL/L (ref 3.4–5.3)
PROT SERPL-MCNC: 7.6 G/DL (ref 6.4–8.3)
RBC # BLD AUTO: 4.51 10E6/UL (ref 3.8–5.2)
SODIUM SERPL-SCNC: 140 MMOL/L (ref 136–145)
TRIGL SERPL-MCNC: 62 MG/DL
TSH SERPL DL<=0.005 MIU/L-ACNC: 1.37 UIU/ML (ref 0.3–4.2)
WBC # BLD AUTO: 7.3 10E3/UL (ref 4–11)

## 2023-05-23 PROCEDURE — 84443 ASSAY THYROID STIM HORMONE: CPT | Performed by: FAMILY MEDICINE

## 2023-05-23 PROCEDURE — 99214 OFFICE O/P EST MOD 30 MIN: CPT | Mod: 25 | Performed by: FAMILY MEDICINE

## 2023-05-23 PROCEDURE — G0439 PPPS, SUBSEQ VISIT: HCPCS | Performed by: FAMILY MEDICINE

## 2023-05-23 PROCEDURE — 80061 LIPID PANEL: CPT | Performed by: FAMILY MEDICINE

## 2023-05-23 PROCEDURE — 80053 COMPREHEN METABOLIC PANEL: CPT | Performed by: FAMILY MEDICINE

## 2023-05-23 PROCEDURE — 85027 COMPLETE CBC AUTOMATED: CPT | Performed by: FAMILY MEDICINE

## 2023-05-23 PROCEDURE — 36415 COLL VENOUS BLD VENIPUNCTURE: CPT | Performed by: FAMILY MEDICINE

## 2023-05-23 RX ORDER — SERTRALINE HYDROCHLORIDE 100 MG/1
1 TABLET, FILM COATED ORAL DAILY
COMMUNITY

## 2023-05-23 RX ORDER — FOLIC ACID 1 MG/1
1 TABLET ORAL DAILY
COMMUNITY

## 2023-05-23 RX ORDER — CETIRIZINE HYDROCHLORIDE 10 MG/1
10 TABLET ORAL DAILY
COMMUNITY
End: 2023-12-08

## 2023-05-23 RX ORDER — CHLORAL HYDRATE 500 MG
2 CAPSULE ORAL DAILY
COMMUNITY
End: 2024-06-05

## 2023-05-23 RX ORDER — ASPIRIN 81 MG/1
81 TABLET, CHEWABLE ORAL DAILY
COMMUNITY

## 2023-05-23 RX ORDER — CHOLECALCIFEROL (VITAMIN D3) 50 MCG
TABLET ORAL DAILY
COMMUNITY

## 2023-05-23 RX ORDER — TRAZODONE HYDROCHLORIDE 50 MG/1
1 TABLET, FILM COATED ORAL AT BEDTIME
COMMUNITY

## 2023-05-23 RX ORDER — PRAMIPEXOLE DIHYDROCHLORIDE 0.25 MG/1
0.25 TABLET ORAL AT BEDTIME
Qty: 90 TABLET | Refills: 1 | Status: SHIPPED | OUTPATIENT
Start: 2023-05-23

## 2023-05-23 ASSESSMENT — ENCOUNTER SYMPTOMS
PARESTHESIAS: 0
COUGH: 0
EYE PAIN: 0
DIZZINESS: 0
SHORTNESS OF BREATH: 0
CHILLS: 0
MYALGIAS: 0
CONSTIPATION: 1
NERVOUS/ANXIOUS: 1
NAUSEA: 0
ARTHRALGIAS: 0
BREAST MASS: 0
JOINT SWELLING: 0
PALPITATIONS: 0
HEARTBURN: 1
DYSURIA: 0
DIARRHEA: 0
HEMATURIA: 0
FREQUENCY: 0
SORE THROAT: 0
HEADACHES: 0
ABDOMINAL PAIN: 0
HEMATOCHEZIA: 0
WEAKNESS: 0
FEVER: 0

## 2023-05-23 ASSESSMENT — ANXIETY QUESTIONNAIRES
GAD7 TOTAL SCORE: 3
6. BECOMING EASILY ANNOYED OR IRRITABLE: SEVERAL DAYS
GAD7 TOTAL SCORE: 3
3. WORRYING TOO MUCH ABOUT DIFFERENT THINGS: NOT AT ALL
8. IF YOU CHECKED OFF ANY PROBLEMS, HOW DIFFICULT HAVE THESE MADE IT FOR YOU TO DO YOUR WORK, TAKE CARE OF THINGS AT HOME, OR GET ALONG WITH OTHER PEOPLE?: NOT DIFFICULT AT ALL
2. NOT BEING ABLE TO STOP OR CONTROL WORRYING: NOT AT ALL
IF YOU CHECKED OFF ANY PROBLEMS ON THIS QUESTIONNAIRE, HOW DIFFICULT HAVE THESE PROBLEMS MADE IT FOR YOU TO DO YOUR WORK, TAKE CARE OF THINGS AT HOME, OR GET ALONG WITH OTHER PEOPLE: NOT DIFFICULT AT ALL
5. BEING SO RESTLESS THAT IT IS HARD TO SIT STILL: NOT AT ALL
7. FEELING AFRAID AS IF SOMETHING AWFUL MIGHT HAPPEN: NOT AT ALL
GAD7 TOTAL SCORE: 3
7. FEELING AFRAID AS IF SOMETHING AWFUL MIGHT HAPPEN: NOT AT ALL
4. TROUBLE RELAXING: SEVERAL DAYS
1. FEELING NERVOUS, ANXIOUS, OR ON EDGE: SEVERAL DAYS

## 2023-05-23 ASSESSMENT — ACTIVITIES OF DAILY LIVING (ADL): CURRENT_FUNCTION: NO ASSISTANCE NEEDED

## 2023-05-23 ASSESSMENT — PATIENT HEALTH QUESTIONNAIRE - PHQ9
SUM OF ALL RESPONSES TO PHQ QUESTIONS 1-9: 4
SUM OF ALL RESPONSES TO PHQ QUESTIONS 1-9: 4

## 2023-05-23 NOTE — ASSESSMENT & PLAN NOTE
Benign thyroid nodules- Had a biopsy 2010 and and it was determined to be a benign colloid nodule. It does not appear to enlarge, so we just monitor clinically. will recheck  tsh

## 2023-05-23 NOTE — ASSESSMENT & PLAN NOTE
Bilateral tinnitus - Chronic tinnitus  Audiology and ent consults ordered in the past  3/31/22 ent racheal -  hearing loss causing tinnitus w tmj exacerbating, needs hearing aids.

## 2023-05-23 NOTE — ASSESSMENT & PLAN NOTE
Basal cell skin - managed by Dr. Kaminski. [facility].  She will do regular skin checks there.

## 2023-05-23 NOTE — ASSESSMENT & PLAN NOTE
Right finger index finger ulnar deviation starting.   Rheum consult ordered in the past.   She declines rheum, wants to watch and wait. It is not worsening.

## 2023-05-23 NOTE — ASSESSMENT & PLAN NOTE
Contraception - menopause  Recommended vaccines zoster and Pneumovax  Pap: no indication. over age 65.  Mammo:  3/2/23 nl mammo  Colonoscopy: Normal colonoscopy 2016.  Repeat in 10 years  Std testing desired:  offered  Osteoporosis prevention discussed.  vitamin d levels ordered. Recommend daily calcium and vitamin d intake to keep good bone health. Recommend weight bearing exercise, no tobacco, and limit alcohol  dexa  -patient has osteoporosis, managed by Dr. Mckeon currently not.  Will defer to Dr. Mckeon.   Recommend sunscreen, exercise, & healthy diet.  Offered cbc, cmp, lipids and asked what other testing she  desires today  I have had an Advance Directives discussion with the patient.   Body mass index is 28.1 kg/m .   mychart active.

## 2023-05-23 NOTE — ASSESSMENT & PLAN NOTE
Overweight with current BMI of 28.10.  Weight reduction is strongly recommended.  She says she has dealt with moms death recently and it was stressful. She gained and lost 10 lbs now that her mom passed away a few weeks ago.

## 2023-05-23 NOTE — ASSESSMENT & PLAN NOTE
Allergic rhinitis- the patient has been using hydroxyzine which works for both her allergic rhinitis and her allergies.

## 2023-05-23 NOTE — ASSESSMENT & PLAN NOTE
Trigger points of right shoulder region- has seen physical therapy in the past and massage helped and it has resolved.

## 2023-05-23 NOTE — PROGRESS NOTES
"mirapex addressed above and beyond usual scope of annual exam.     SUBJECTIVE:   Awilda is a 68 year old who presents for Preventive Visit.      5/23/2023     2:13 PM   Additional Questions   Roomed by as   Accompanied by self         5/23/2023     2:13 PM   Patient Reported Additional Medications   Patient reports taking the following new medications no   Patient has been advised of split billing requirements and indicates understanding: Yes  Are you in the first 12 months of your Medicare coverage?  No    Healthy Habits:     In general, how would you rate your overall health?  Good    Frequency of exercise:  None    Do you usually eat at least 4 servings of fruit and vegetables a day, include whole grains    & fiber and avoid regularly eating high fat or \"junk\" foods?  No    Taking medications regularly:  Yes    Medication side effects:  None    Ability to successfully perform activities of daily living:  No assistance needed    Home Safety:  No safety concerns identified    Hearing Impairment:  No hearing concerns    In the past 6 months, have you been bothered by leaking of urine?  No    In general, how would you rate your overall mental or emotional health?  Fair      PHQ-2 Total Score: 2    Additional concerns today:  Yes    Have you ever done Advance Care Planning? (For example, a Health Directive, POLST, or a discussion with a medical provider or your loved ones about your wishes): No, advance care planning information given to patient to review.  Patient declined advance care planning discussion at this time.       Fall risk  Fallen 2 or more times in the past year?: No  Any fall with injury in the past year?: No    Cognitive Screening   1) Repeat 3 items (Leader, Season, Table)    2) Clock draw: NORMAL  3) 3 item recall: Recalls 3 objects  Results: 3 items recalled: COGNITIVE IMPAIRMENT LESS LIKELY    Mini-CogTM Copyright HENRY Do. Licensed by the author for use in [facility]; reprinted with " permission (hernán@[facility]). All rights reserved.        Reviewed and updated as needed this visit by clinical staff   Tobacco  Allergies  Meds  Problems  Med Hx  Surg Hx  Fam Hx          Reviewed and updated as needed this visit by Provider   Tobacco  Allergies  Meds  Problems  Med Hx  Surg Hx  Fam Hx         Social History     Tobacco Use     Smoking status: Never     Smokeless tobacco: Never   Vaping Use     Vaping status: Never Used   Substance Use Topics     Alcohol use: Yes     Comment: Alcoholic Drinks/day: rarely             5/23/2023     2:10 PM   Alcohol Use   Prescreen: >3 drinks/day or >7 drinks/week? No     Do you have a current opioid prescription? No  Do you use any other controlled substances or medications that are not prescribed by a provider? None        Current providers sharing in care for this patient include:   Patient Care Team:  Carol Dickinson MD as PCP - General (Family Practice)  Carol Dickinson MD as Assigned PCP  Susu Baker MD as MD (OB/Gyn)  Annabelle Kong MD as MD (Otolaryngology)  Annabelle Kong MD as Assigned Surgical Provider  Josy Healy NP as Nurse Practitioner    The following health maintenance items are reviewed in Epic and correct as of today:  Health Maintenance   Topic Date Due     ZOSTER IMMUNIZATION (1 of 2) Never done     Pneumococcal Vaccine: 65+ Years (2 - PPSV23 if available, else PCV20) 03/03/2022     COVID-19 Vaccine (6 - Pfizer series) 08/04/2023     MEDICARE ANNUAL WELLNESS VISIT  05/23/2024     ANNUAL REVIEW OF HM ORDERS  05/23/2024     FALL RISK ASSESSMENT  05/23/2024     MAMMO SCREENING  03/01/2025     LIPID  08/04/2026     COLORECTAL CANCER SCREENING  08/22/2026     ADVANCE CARE PLANNING  05/23/2028     DTAP/TDAP/TD IMMUNIZATION (3 - Td or Tdap) 10/31/2028     DEXA  09/15/2036     HEPATITIS C SCREENING  Completed     PHQ-2 (once per calendar year)  Completed     INFLUENZA VACCINE  Completed     IPV  IMMUNIZATION  Aged Out     MENINGITIS IMMUNIZATION  Aged Out       FHS-7:       2/21/2022     1:26 PM 3/1/2023     1:37 PM 5/23/2023     2:12 PM   Breast CA Risk Assessment (FHS-7)   Did any of your first-degree relatives have breast or ovarian cancer? No No No   Did any of your relatives have bilateral breast cancer? No No No   Did any man in your family have breast cancer? No No    Did any woman in your family have breast and ovarian cancer? Yes No    Did any woman in your family have breast cancer before age 50 y? No No    Do you have 2 or more relatives with breast and/or ovarian cancer? No No    Do you have 2 or more relatives with breast and/or bowel cancer? No No        Mammogram Screening: Recommended mammography every 1-2 years with patient discussion and risk factor consideration  Pertinent mammograms are reviewed under the imaging tab.    Review of Systems   Constitutional: Negative for chills and fever.   HENT: Negative for congestion, ear pain, hearing loss and sore throat.    Eyes: Negative for pain and visual disturbance.   Respiratory: Negative for cough and shortness of breath.    Cardiovascular: Negative for chest pain, palpitations and peripheral edema.   Gastrointestinal: Positive for constipation and heartburn. Negative for abdominal pain, diarrhea, hematochezia and nausea.   Breasts:  Negative for tenderness, breast mass and discharge.   Genitourinary: Negative for dysuria, frequency, genital sores, hematuria, pelvic pain, urgency, vaginal bleeding and vaginal discharge.   Musculoskeletal: Negative for arthralgias, joint swelling and myalgias.   Skin: Negative for rash.   Neurological: Negative for dizziness, weakness, headaches and paresthesias.   Psychiatric/Behavioral: Negative for mood changes. The patient is nervous/anxious.      Constipation and heart burn - she thinks this is related to unhealthy eating and over eating, feels this could be improved with diet modification. otc acid  "reducers help prn.she feels this is controlled with diet.     OBJECTIVE:   BP (!) 147/84 (BP Location: Left arm, Patient Position: Left side, Cuff Size: Adult Large)   Pulse 70   Ht 1.632 m (5' 4.25\")   Wt 74.8 kg (165 lb)   LMP  (LMP Unknown)   SpO2 98%   BMI 28.10 kg/m   Estimated body mass index is 28.1 kg/m  as calculated from the following:    Height as of this encounter: 1.632 m (5' 4.25\").    Weight as of this encounter: 74.8 kg (165 lb).  Physical Exam  Constitutional:       Appearance: Normal appearance.   HENT:      Head: Normocephalic and atraumatic.   Cardiovascular:      Rate and Rhythm: Normal rate and regular rhythm.   Pulmonary:      Effort: Pulmonary effort is normal.   Musculoskeletal:         General: Normal range of motion.      Cervical back: Normal range of motion and neck supple.   Neurological:      General: No focal deficit present.      Mental Status: She is alert and oriented to person, place, and time.           Diagnostic Test Results:  Labs reviewed in Epic    ASSESSMENT / PLAN:     Problem List Items Addressed This Visit        Nervous and Auditory    Tinnitus, bilateral     Bilateral tinnitus - Chronic tinnitus  Audiology and ent consults ordered in the past  3/31/22 ent racheal -  hearing loss causing tinnitus w tmj exacerbating, needs hearing aids.         RESOLVED: Trigger point of shoulder region, right     Trigger points of right shoulder region- has seen physical therapy in the past and massage helped and it has resolved.          Relevant Medications    aspirin (ASA) 81 MG chewable tablet       Respiratory    Allergic rhinitis, unspecified     Allergic rhinitis- the patient has been using hydroxyzine which works for both her allergic rhinitis and her allergies.         Relevant Medications    cetirizine (ZYRTEC) 10 MG tablet       Digestive    Hepatic steatosis     Hepatic steatosis, will continue to monitor lfts.          Relevant Orders    Comprehensive metabolic panel " (BMP + Alb, Alk Phos, ALT, AST, Total. Bili, TP)    Diverticulosis of large intestine     Diverticulosis - Not current problem, controlled with diet.             Endocrine    Dyslipidemia     Will recheck lipids. Discussed statins she is not ready for this, will recheck lipids now.          Relevant Orders    Lipid Profile    Multiple thyroid nodules     Benign thyroid nodules- Had a biopsy 2010 and and it was determined to be a benign colloid nodule. It does not appear to enlarge, so we just monitor clinically. will recheck  tsh          Relevant Orders    TSH with free T4 reflex       Musculoskeletal and Integumentary    Restless legs syndrome     Restless leg syndrome Needs refill of mirapex. No side effects noted.          Relevant Medications    aspirin (ASA) 81 MG chewable tablet    pramipexole (MIRAPEX) 0.25 MG tablet    Senile osteoporosis     Osteoporosis Sees Dr. Mckeon. Plans follow up late summer/ fall         Relevant Medications    vitamin D3 (CHOLECALCIFEROL) 50 mcg (2000 units) tablet    aspirin (ASA) 81 MG chewable tablet    History of basal cell carcinoma     Basal cell skin - managed by Dr. Kaminski. [facility].  She will do regular skin checks there.          Relevant Medications    cetirizine (ZYRTEC) 10 MG tablet    Finger deformity, right     Right finger index finger ulnar deviation starting.   Rheum consult ordered in the past.   She declines rheum, wants to watch and wait. It is not worsening.         Basal cell carcinoma of skin     Basal cell carcinoma of the skin-dermatology referral is placed for skin checks         Relevant Medications    cetirizine (ZYRTEC) 10 MG tablet       Behavioral    Eating disorder     Eating disorder-Still uses zoloft 100mg po q day to control appetite -prescribed by psych nurse practitioner at [facility].            Other    Encounter for preventive care     Contraception - menopause  Recommended vaccines zoster and Pneumovax  Pap: no indication.  over age 65.  Mammo:  3/2/23 nl mammo  Colonoscopy: Normal colonoscopy 2016.  Repeat in 10 years  Std testing desired:  offered  Osteoporosis prevention discussed.  vitamin d levels ordered. Recommend daily calcium and vitamin d intake to keep good bone health. Recommend weight bearing exercise, no tobacco, and limit alcohol  dexa  -patient has osteoporosis, managed by Dr. Mckeon currently not.  Will defer to Dr. Mckeon.   Recommend sunscreen, exercise, & healthy diet.  Offered cbc, cmp, lipids and asked what other testing she  desires today  I have had an Advance Directives discussion with the patient.   Body mass index is 28.1 kg/m .   mychart active.         Overweight with body mass index (BMI) of 28 to 28.9 in adult     Overweight with current BMI of 28.10.  Weight reduction is strongly recommended.  She says she has dealt with moms death recently and it was stressful. She gained and lost 10 lbs now that her mom passed away a few weeks ago.         S/P vaginal hysterectomy     Status post vaginal hysterectomy-managed by Dr. Khan         Anxiety state     Anxiety-managed by psychiatry         Relevant Medications    sertraline (ZOLOFT) 100 MG tablet    traZODone (DESYREL) 50 MG tablet    Insomnia     Insomnia, trazodone 50 mg orally at night helps.  Currently managed by psychiatry.         Elevated blood pressure reading without diagnosis of hypertension     Elevated bp today.   HTN  Patient is asked to monitor BP at home or work, several times per month and return with written values at next office visit. Goal bp is 120/80. If staying higher than 130/85 on three occasions you should bring the values into clinic so that we can evaluate and treat if needed.    Recommend stop alcohol, caffiene, ibuprofen, carbonated drinks, sudafed & decrease salt intake. If you smoke, it is recommended that you quit. Keep weight in normal range.     Drinks 12 - 18 oz wine per week  Drinks 6-7 bottles of diet coke per day.  " (she wants to decrease this)    Follow up in 1 month to reevaluate.          RESOLVED: H/O colonoscopy     History of colonoscopy in 2016, 10-year follow-up was recommended        Other Visit Diagnoses     Encounter for Medicare annual wellness exam    -  Primary    Relevant Orders    PRIMARY CARE FOLLOW-UP SCHEDULING    Need for shingles vaccine        Immunization due        Screening, anemia, deficiency, iron        Relevant Orders    CBC with platelets          Patient has been advised of split billing requirements and indicates understanding: Yes      COUNSELING:  Reviewed preventive health counseling, as reflected in patient instructions       Regular exercise       Healthy diet/nutrition       Osteoporosis prevention/bone health       Safe sex practices/STD prevention       Colon cancer screening       Advanced Planning       BMI:   Estimated body mass index is 28.1 kg/m  as calculated from the following:    Height as of this encounter: 1.632 m (5' 4.25\").    Weight as of this encounter: 74.8 kg (165 lb).   Weight management plan: Discussed healthy diet and exercise guidelines      She reports that she has never smoked. She has never used smokeless tobacco.      Appropriate preventive services were discussed with this patient, including applicable screening as appropriate for cardiovascular disease, diabetes, osteopenia/osteoporosis, and glaucoma.  As appropriate for age/gender, discussed screening for colorectal cancer, prostate cancer, breast cancer, and cervical cancer. Checklist reviewing preventive services available has been given to the patient.    Reviewed patients plan of care and provided an AVS. The Basic Care Plan (routine screening as documented in Health Maintenance) for Awilda meets the Care Plan requirement. This Care Plan has been established and reviewed with the Patient.    Carol Dickinson MD  [facility]    Identified Health Risks:    I have reviewed Opioid Use " Disorder and Substance Use Disorder risk factors and made any needed referrals.     Answers for HPI/ROS submitted by the patient on 5/23/2023  PHQ9 TOTAL SCORE: 4  CAROL 7 TOTAL SCORE: 3

## 2023-05-23 NOTE — ASSESSMENT & PLAN NOTE
Elevated bp today.   HTN  Patient is asked to monitor BP at home or work, several times per month and return with written values at next office visit. Goal bp is 120/80. If staying higher than 130/85 on three occasions you should bring the values into clinic so that we can evaluate and treat if needed.    Recommend stop alcohol, caffiene, ibuprofen, carbonated drinks, sudafed & decrease salt intake. If you smoke, it is recommended that you quit. Keep weight in normal range.     Drinks 12 - 18 oz wine per week  Drinks 6-7 bottles of diet coke per day.  (she wants to decrease this)    Follow up in 1 month to reevaluate.

## 2023-05-23 NOTE — ASSESSMENT & PLAN NOTE
Eating disorder-Still uses zoloft 100mg po q day to control appetite -prescribed by psych nurse practitioner at [facility].

## 2023-05-23 NOTE — PATIENT INSTRUCTIONS
HTN  Patient is asked to monitor BP at home or work, several times per month and return with written values at next office visit. Goal bp is 120/80. If staying higher than 130/85 on three occasions you should bring the values into clinic so that we can evaluate and treat if needed.    Recommend stop alcohol, caffiene, ibuprofen, carbonated drinks, sudafed & decrease salt intake. If you smoke, it is recommended that you quit. Keep weight in normal range.       Patient Education   Personalized Prevention Plan  You are due for the preventive services outlined below.  Your care team is available to assist you in scheduling these services.  If you have already completed any of these items, please share that information with your care team to update in your medical record.  Health Maintenance Due   Topic Date Due    ANNUAL REVIEW OF HM ORDERS  Never done    Zoster (Shingles) Vaccine (1 of 2) Never done    Pneumococcal Vaccine (2 - PPSV23 if available, else PCV20) 03/03/2022    Annual Wellness Visit  08/04/2022

## 2023-06-23 ENCOUNTER — OFFICE VISIT (OUTPATIENT)
Dept: FAMILY MEDICINE | Facility: CLINIC | Age: 68
End: 2023-06-23
Payer: COMMERCIAL

## 2023-06-23 VITALS
BODY MASS INDEX: 28.92 KG/M2 | OXYGEN SATURATION: 98 % | HEART RATE: 78 BPM | SYSTOLIC BLOOD PRESSURE: 113 MMHG | DIASTOLIC BLOOD PRESSURE: 72 MMHG | TEMPERATURE: 98.1 F | WEIGHT: 169.4 LBS | RESPIRATION RATE: 16 BRPM | HEIGHT: 64 IN

## 2023-06-23 DIAGNOSIS — R03.0 ELEVATED BLOOD PRESSURE READING WITHOUT DIAGNOSIS OF HYPERTENSION: ICD-10-CM

## 2023-06-23 DIAGNOSIS — M20.001 FINGER DEFORMITY, RIGHT: Primary | ICD-10-CM

## 2023-06-23 PROCEDURE — 99213 OFFICE O/P EST LOW 20 MIN: CPT | Performed by: FAMILY MEDICINE

## 2023-06-23 RX ORDER — QUETIAPINE FUMARATE 25 MG/1
TABLET, FILM COATED ORAL
COMMUNITY
End: 2023-12-08

## 2023-06-23 NOTE — ASSESSMENT & PLAN NOTE
ulnar deviation noted in right index finger, wants to consider meds for prevention of worsening, mom had really bad ulnar deviation.

## 2023-06-23 NOTE — PROGRESS NOTES
"  Assessment & Plan   Problem List Items Addressed This Visit        Musculoskeletal and Integumentary    Finger deformity, right - Primary     ulnar deviation noted in right index finger, wants to consider meds for prevention of worsening, mom had really bad ulnar deviation.         Relevant Orders    Adult Rheumatology  Referral       Other    RESOLVED: Elevated blood pressure reading without diagnosis of hypertension     Blood pressure without hypertension-resolved with decreasing intake of diet Coke.  She was drinking 6 diet Cokes a day and has dropped it to 4 diet Cokes a day which she is intending to decrease further as she is able.  Now her blood pressure is 113/72 and she is happy about this.  We will resolve this problem but continue to check her blood pressures when she comes in.           Carol Dickinson MD  [facility]    Shelly Kaiser is a 68 year old, presenting for the following health issues:  Hypertension        6/23/2023     1:06 PM   Additional Questions   Roomed by ac   Accompanied by self     History of Present Illness       Reason for visit:  Follow up for blood pressure    She eats 2-3 servings of fruits and vegetables daily.She consumes 0 sweetened beverage(s) daily.She exercises with enough effort to increase her heart rate 9 or less minutes per day.  She exercises with enough effort to increase her heart rate 3 or less days per week.   She is taking medications regularly.           Objective    /72 (BP Location: Left arm, Patient Position: Sitting, Cuff Size: Adult Large)   Pulse 78   Temp 98.1  F (36.7  C) (Oral)   Resp 16   Ht 1.632 m (5' 4.25\")   Wt 76.8 kg (169 lb 6.4 oz)   LMP  (LMP Unknown)   SpO2 98%   BMI 28.85 kg/m    Body mass index is 28.85 kg/m .  Physical Exam  Constitutional:       Appearance: Normal appearance.   HENT:      Head: Normocephalic and atraumatic.   Cardiovascular:      Rate and Rhythm: Normal rate and regular " rhythm.   Pulmonary:      Effort: Pulmonary effort is normal.   Musculoskeletal:         General: Normal range of motion.      Cervical back: Normal range of motion and neck supple.   Neurological:      General: No focal deficit present.      Mental Status: She is alert and oriented to person, place, and time.

## 2023-10-14 ENCOUNTER — IMMUNIZATION (OUTPATIENT)
Dept: FAMILY MEDICINE | Facility: CLINIC | Age: 68
End: 2023-10-14
Payer: COMMERCIAL

## 2023-10-14 PROCEDURE — 90662 IIV NO PRSV INCREASED AG IM: CPT

## 2023-10-14 PROCEDURE — G0008 ADMIN INFLUENZA VIRUS VAC: HCPCS

## 2023-11-17 ENCOUNTER — HOSPITAL ENCOUNTER (OUTPATIENT)
Dept: BONE DENSITY | Facility: HOSPITAL | Age: 68
Discharge: HOME | End: 2023-11-17
Attending: INTERNAL MEDICINE | Admitting: INTERNAL MEDICINE
Payer: COMMERCIAL

## 2023-11-17 DIAGNOSIS — M81.0 SENILE OSTEOPOROSIS: ICD-10-CM

## 2023-11-17 PROCEDURE — 77080 DXA BONE DENSITY AXIAL: CPT

## 2023-12-08 ENCOUNTER — OFFICE VISIT (OUTPATIENT)
Dept: INTERNAL MEDICINE | Facility: CLINIC | Age: 68
End: 2023-12-08
Payer: COMMERCIAL

## 2023-12-08 VITALS
DIASTOLIC BLOOD PRESSURE: 80 MMHG | WEIGHT: 161.8 LBS | BODY MASS INDEX: 26.96 KG/M2 | HEART RATE: 74 BPM | OXYGEN SATURATION: 95 % | HEIGHT: 65 IN | SYSTOLIC BLOOD PRESSURE: 130 MMHG | RESPIRATION RATE: 16 BRPM

## 2023-12-08 DIAGNOSIS — M81.0 SENILE OSTEOPOROSIS: Primary | ICD-10-CM

## 2023-12-08 PROCEDURE — 99213 OFFICE O/P EST LOW 20 MIN: CPT | Performed by: INTERNAL MEDICINE

## 2023-12-08 NOTE — PATIENT INSTRUCTIONS
We will stop Fosamax now.    Appropriate calcium, vitamin D supplements, along with balance and weight bearing exercise recommended with followup bone density scan in 2 years.   We will repeat DXA scan in 2 years.

## 2023-12-08 NOTE — PROGRESS NOTES
"  Assessment & Plan     Senile osteoporosis  DXA in 2019 with T-score -2.5 on L1-L2.   Fosamax started 10/2019 and she is tolerating it well.  History of fractures: left wrist fracture in 2018, right fibula fracture 10 years ago      FH: osteoporosis - mother, M-GM, M-Aunts. Her mother had 5 compression vertebral fractures and pelvic fracture.   Kidney stones and lupus - daughter and GD.    DXA 11/17/2023:  DXA RESULTS  -Lumbar Spine: L1-L4: BMD: 1.033 g/cm2. T-score: -1.2. Z-score: 0.4.  -RIGHT Hip Total: BMD: 0.931 g/cm2. T-score: -0.6. Z-score: 0.7.  -RIGHT Hip Femoral neck: BMD: 0.909 g/cm2. T-score: -0.9. Z-score: 0.6.  -LEFT Hip Total: BMD: 0.859 g/cm2. T-score: -1.2. Z-score: 0.1.  -LEFT Hip Femoral neck: BMD: 0.842 g/cm2. T-score: -1.4. Z-score: 0.1.    INTERVAL CHANGE  -There has been a 0.3% increase in lumbar spine BMD.  -There has been a 0.2% decrease in left hip BMD.    Patient Instructions   We will stop Fosamax now.    Appropriate calcium, vitamin D supplements, along with balance and weight bearing exercise recommended with followup bone density scan in 2 years.   We will repeat DXA scan in 2 years.              BMI:   Estimated body mass index is 27.34 kg/m  as calculated from the following:    Height as of this encounter: 1.638 m (5' 4.5\").    Weight as of this encounter: 73.4 kg (161 lb 12.8 oz).           Mauro Mckeon MD  [facility]    Shelly Kaiser is a 68 year old, presenting for the following health issues:  Osteoporosis (Osteo F/U)      12/8/2023     3:02 PM   Additional Questions   Roomed by Juliana KELSEY               Review of Systems         Objective    /80   Pulse 74   Resp 16   Ht 1.638 m (5' 4.5\")   Wt 73.4 kg (161 lb 12.8 oz)   LMP  (LMP Unknown)   SpO2 95%   BMI 27.34 kg/m    Body mass index is 27.34 kg/m .  Physical Exam                         "

## 2023-12-15 NOTE — ASSESSMENT & PLAN NOTE
Called and spoke with  and informed him that a 90 day supply cannot be given because after the first of the year [facility] will no longer be prescribing controlled medications.  verbalized understanding. They will reach out to a psychiatrist and get an appointment scheduled. Information given for [facility].   She says her restless leg syndrome is under control now.  She notes that when she walks at least 10,000 steps a day and does some walking in the evening hours it is really helpful.  Also she intermittently uses Mirapex for restless legs which is also helpful.  She would like a refill of the Mirapex.  We did check her CBC which was normal and her ferritin which was 77.  At this point I will not consult the sleep specialist since she feels things are managed but in the future we could always consider that.

## 2023-12-19 DIAGNOSIS — M81.0 SENILE OSTEOPOROSIS: ICD-10-CM

## 2023-12-19 RX ORDER — ALENDRONATE SODIUM 70 MG/1
TABLET ORAL
Qty: 12 TABLET | Refills: 1 | Status: SHIPPED | OUTPATIENT
Start: 2023-12-19 | End: 2024-06-05

## 2023-12-20 ENCOUNTER — TRANSFERRED RECORDS (OUTPATIENT)
Dept: HEALTH INFORMATION MANAGEMENT | Facility: CLINIC | Age: 68
End: 2023-12-20
Payer: COMMERCIAL

## 2024-01-22 NOTE — PROGRESS NOTES
Rheumatology Clinic Visit  [facility]  RIAZ Valiente     Awilda Pandya MRN# [id number]   YOB: 1955 Age: 68 year old   Date of Visit: 1/26/2024  Primary care provider: Carol Dickinson          Assessment and Plan:     1.  Finger joint deformity, right    Patient presents today for an initial evaluation.  She states she started to notice changes to the DIP of her right pointer finger as well as the right middle finger.  She is wondering if there may be some changes on the left as well.  She has occasional pain noted primarily as a burning sensation that lasts for a short period of time.  The pain is not consistent.  She does not have any skin rashes.  Physical examination today does show Heberden node to the right pointer finger.  Some extension to the DIP of the middle finger and ring finger on the right as well as the middle DIP on the left.  Previous laboratory evaluations were reviewed, results below.    Reviewed with the patient that her symptoms are suggestive of osteoarthritis.  Will get hand x-rays today as well as check some antibodies and her inflammatory markers.  Also recommend that she try Voltaren gel which is over-the-counter when she does have increased pain in that joint.  Discussed with the patient that there is no medication that will slow down the progression of osteoarthritis.  I would recommend that she consider hand therapy.  I will contact patient with the results of her evaluation once it is complete    Plan:     Schedule follow-up with Jenna Oleary PA-C as needed  Imaging: xray hands  Labs: CCP antibody, Rheumatoid Factor, CRP, Sed Rate  Medication recommendations:   Look into Voltaren Gel-this is over the counter    RIAZ Valiente  Rheumatology         History of Present Illness:   Awilda Pandya presents for evaluation of joint pain/deformity.      She states that she wants to slow down changes to her finger. She doesn't have pain constantly. She may have  some burning for a few minutes and then it resolves. She is able to do cross stitch. It is the right DIP. She thinks there is some turning to the right middle finger. She states that her mother had changes to her fingers as well. No skin rashes. No unexplained fevers or weight loss. No fatigue. No shortness of breath.     Daughter with lupus. Personal history of being diagnosed with Erythema nodosum in her 30's. Cousin with scleroderma.          Review of Systems:     Constitutional: negative  Skin: negative  Eyes: negative  Ears/Nose/Throat: negative  Respiratory: No shortness of breath, dyspnea on exertion, cough, or hemoptysis  Cardiovascular: negative  Gastrointestinal: negative  Genitourinary: negative  Musculoskeletal: as above  Neurologic: negative  Psychiatric: negative  Hematologic/Lymphatic/Immunologic: negative  Endocrine: negative         Active Problem List:     Patient Active Problem List    Diagnosis Date Noted    Tinnitus, bilateral 02/19/2022     Priority: Medium    Basal cell carcinoma of skin 08/04/2021     Priority: Medium    Eating disorder 01/11/2021     Priority: Medium    Finger deformity, right 01/11/2021     Priority: Medium    Insomnia 09/21/2020     Priority: Medium    History of basal cell carcinoma 11/26/2019     Priority: Medium    Diverticulosis of large intestine 11/26/2019     Priority: Medium    Senile osteoporosis 10/23/2019     Priority: Medium    Restless legs syndrome 04/04/2019     Priority: Medium    Hepatic steatosis 10/30/2018     Priority: Medium    Encounter for preventive care 06/20/2017     Priority: Medium    Overweight with body mass index (BMI) of 28 to 28.9 in adult 06/20/2017     Priority: Medium    Dyslipidemia 06/20/2017     Priority: Medium    S/P vaginal hysterectomy 01/19/2016     Priority: Medium     Seelaura johansen  Overview:   Vaginal hysterectomy and placement of bladder sling on 1-19-16 for   treatment of cystocele with uterine prolapse       Formatting of  this note might be different from the original.  Formatting of this note might be different from the original.  Vaginal hysterectomy and placement of bladder sling on 1-19-16 for treatment of cystocele with uterine prolapse  Formatting of this note might be different from the original.  Sees Dr. johansen  Overview:   Vaginal hysterectomy and placement of bladder sling on 1-19-16 for   treatment of cystocele with uterine prolapse     Last Assessment & Plan:   Formatting of this note might be different from the original.  Managed by gynecology.      Anxiety state 05/03/2011     Priority: Medium    Multiple thyroid nodules 03/16/2010     Priority: Medium     Formatting of this note might be different from the original.  Thyroid US on 3-16-10: Bilateral thyroid nodules.  The dominant nodule within the lower pole left thyroid lobe would be amenable to an ultrasound-guided fine needle aspiration biopsy for tissue diagnosis. Successful ultrasound-guided fine-needle aspiration of thyroid nodule on 3-26-10. Benign colloid nodule.    Formatting of this note might be different from the original.  Formatting of this note might be different from the original.  Thyroid US on 3-16-10: Bilateral thyroid nodules.  The dominant nodule within the lower pole left thyroid lobe would be amenable to an ultrasound-guided fine needle aspiration biopsy for tissue diagnosis. Successful ultrasound-guided fine-needle aspiration of thyroid nodule on 3-26-10. Benign colloid nodule.  Formatting of this note might be different from the original.  Formatting of this note might be different from the original.  Thyroid US on 3-16-10: Bilateral thyroid nodules.  The dominant nodule within the lower pole left thyroid lobe would be amenable to an ultrasound-guided fine needle aspiration biopsy for tissue diagnosis. Successful ultrasound-guided fine-needle aspiration of thyroid nodule on 3-26-10. Benign colloid nodule.    Last Assessment & Plan:   Formatting  of this note might be different from the original.  Had a biopsy 2010 and and it was determined to be a benign colloid nodule. It does not appear to enlarge, so we just monitor clinically.      Allergic rhinitis, unspecified 02/19/2007     Priority: Medium            Past Medical History:     Past Medical History:   Diagnosis Date    Abnormal weight gain 10/30/2018    Basal cell carcinoma     BMI 25.0-25.9,adult 6/20/2017    Chronic nonalcoholic liver disease 8/4/2021    Chronic right-sided thoracic back pain 11/27/2018    Elevated blood pressure reading without diagnosis of hypertension 5/23/2023    Fracture of left wrist with routine healing 10/23/2019    H/O colonoscopy 6/20/2017    Left wrist pain 10/30/2018    Osteoporosis 8/4/2021    S/P hysterectomy 6/20/2017    Sees Dr. johansen    Trigger point of shoulder region, right 2/19/2022    Uterovaginal prolapse      Past Surgical History:   Procedure Laterality Date    COLPORRHAPHY N/A 1/19/2016    Procedure: UTEROSACRAL LIGAMENT SUSPENSION, CYSTOCELE, CYSTOSCOPY;  Surgeon: Susu Johansen MD;  Location: [facility];  Service:     HYSTERECTOMY TOTAL ABDOMINAL  01/2016    MOUTH SURGERY      IA VAGINAL HYSTERECTOMY,UTERUS 250 GMS/< N/A 1/19/2016    Procedure: VAGINAL HYSTERECTOMY;  Surgeon: Susu Johansen MD;  Location: [facility];  Service: Gynecology    TUBAL LIGATION              Social History:     Social History     Socioeconomic History    Marital status:      Spouse name: Not on file    Number of children: Not on file    Years of education: Not on file    Highest education level: 12th grade   Occupational History    Occupation: Retired   Tobacco Use    Smoking status: Never     Passive exposure: Never    Smokeless tobacco: Never   Vaping Use    Vaping Use: Never used   Substance and Sexual Activity    Alcohol use: Yes     Comment: Alcoholic Drinks/day: rarely    Drug use: No    Sexual activity: Yes     Partners: Male     Birth  control/protection: Surgical   Other Topics Concern    Not on file   Social History Narrative    11/26/2019 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   1/15/2020  fell and had head bleed, she is stressed.   2/18/2020  better on venlafaxine from both head bleed and ptsd.    8/4/2021 granddaughter is 19 dx with terminal rhabdosarcoma and is expected to live no more than a year.      Social Determinants of Health     Financial Resource Strain: Low Risk  (12/8/2023)    Financial Resource Strain     Within the past 12 months, have you or your family members you live with been unable to get utilities (heat, electricity) when it was really needed?: No   Food Insecurity: Low Risk  (12/8/2023)    Food Insecurity     Within the past 12 months, did you worry that your food would run out before you got money to buy more?: No     Within the past 12 months, did the food you bought just not last and you didn t have money to get more?: No   Transportation Needs: Low Risk  (12/8/2023)    Transportation Needs     Within the past 12 months, has lack of transportation kept you from medical appointments, getting your medicines, non-medical meetings or appointments, work, or from getting things that you need?: No   Physical Activity: Not on file   Stress: Not on file   Social Connections: Not on file   Interpersonal Safety: Low Risk  (12/8/2023)    Interpersonal Safety     Do you feel physically and emotionally safe where you currently live?: Yes     Within the past 12 months, have you been hit, slapped, kicked or otherwise physically hurt by someone?: No     Within the past 12 months, have you been humiliated or emotionally abused in other ways by your partner or ex-partner?: No   Housing Stability: Low Risk  (12/8/2023)    Housing Stability     Do you have housing? : Yes     Are you worried about losing your housing?: No          Family History:     Family History   Problem Relation Age of Onset    Breast Cancer Cousin 60.00     Osteoporosis Mother     Hernia Mother     Mental Illness Mother     Diverticulitis Mother         FISTULA AND COLOSTOMY /RESECTION    Parkinsonism Father     Autoimmune Disease Brother         CLIPPERS    Colon Cancer Cousin     Diverticulitis Brother             Allergies:     Allergies   Allergen Reactions    Gluten [Gluten Meal] Unknown    Other Food Allergy Unknown     WHEAT FLOUR            Medications:     Current Outpatient Medications   Medication Sig Dispense Refill    aspirin (ASA) 81 MG chewable tablet Take 81 mg by mouth daily      buPROPion (WELLBUTRIN XL) 150 MG 24 hr tablet Take 150 mg by mouth daily      fish oil-omega-3 fatty acids 1000 MG capsule Take 2 g by mouth daily      folic acid (FOLVITE) 1 MG tablet Take 1 mg by mouth daily      hydrOXYzine (VISTARIL) 50 MG capsule TAKE 1 CAPSULE (50 MG TOTAL) BY MOUTH 4 (FOUR) TIMES A DAY AS NEEDED FOR ANXIETY. 90 capsule 10    multivitamin (MULTIPLE VITAMIN ESSENTIAL) per tablet [MULTIVITAMIN (MULTIPLE VITAMIN ESSENTIAL) PER TABLET] Take 1 tablet by mouth 2 (two) times a day.             pramipexole (MIRAPEX) 0.25 MG tablet Take 1 tablet (0.25 mg) by mouth At Bedtime 90 tablet 1    sertraline (ZOLOFT) 100 MG tablet Take 1 tablet by mouth daily      traZODone (DESYREL) 50 MG tablet Take 1 tablet by mouth At Bedtime      vitamin D3 (CHOLECALCIFEROL) 50 mcg (2000 units) tablet Take by mouth daily      alendronate (FOSAMAX) 70 MG tablet TAKE 1 TABLET BY MOUTH EVERY 7 DAYS IN THE AM WITH FULL GLASS OF WATER ON EMPTY STOMACH (Patient not taking: Reported on 1/26/2024) 12 tablet 1    buPROPion (WELLBUTRIN SR) 150 MG 12 hr tablet Take 150 mg by mouth 2 times daily      estradiol (ESTRACE) 0.01 % (0.1 mg/gram) vaginal cream [ESTRADIOL (ESTRACE) 0.01 % (0.1 MG/GRAM) VAGINAL CREAM] INSERT 2 GRAM BY VAGINAL ROUTE TWICE WEEKLY (Patient not taking: Reported on 1/26/2024)  0    traMADol (ULTRAM) 50 MG tablet TAKE 1 TABLET EVERY 4 TO 6 HOURS AS NEEDED FOR PAIN (Patient  not taking: Reported on 1/26/2024)              Physical Exam:   Blood pressure (!) 134/90, pulse 71, SpO2 96%, not currently breastfeeding.  Wt Readings from Last 6 Encounters:   12/08/23 73.4 kg (161 lb 12.8 oz)   06/23/23 76.8 kg (169 lb 6.4 oz)   05/23/23 74.8 kg (165 lb)   11/23/22 79.1 kg (174 lb 6.4 oz)   03/18/22 75.8 kg (167 lb 1.6 oz)   02/18/22 73.6 kg (162 lb 4.8 oz)     Constitutional: well-developed, appearing stated age; cooperative  Eyes: nl conjunctiva, sclera  ENT: nl external ears, nose, hearing, lips,   Neck: no mass or thyroid enlargement  Resp: Shortness of breath with normal conversation  Lymph: no cervical, supraclavicular or epitrochlear nodes  MS: No active synovitis or altered joint anatomy. Full joint ROM. Normal  strength. No dactylitis,  tenosynovitis, enthesopathy. +heberden nodes on right DIP  Skin: no nail pitting, alopecia, rash, nodules or lesions.   Psych: nl judgement, orientation, memory, affect.           Data:   Imaging:  No recent joint imaing    Laboratory:  1/8/2021  Creatinine 0.70, GFR greater than 60  CRP 0.2  CCP antibody less than 0.5  Rheumatoid factor less than 15  Sed rate 5

## 2024-01-26 ENCOUNTER — OFFICE VISIT (OUTPATIENT)
Dept: RHEUMATOLOGY | Facility: CLINIC | Age: 69
End: 2024-01-26
Payer: COMMERCIAL

## 2024-01-26 ENCOUNTER — ANCILLARY PROCEDURE (OUTPATIENT)
Dept: GENERAL RADIOLOGY | Facility: CLINIC | Age: 69
End: 2024-01-26
Attending: PHYSICIAN ASSISTANT
Payer: COMMERCIAL

## 2024-01-26 VITALS — DIASTOLIC BLOOD PRESSURE: 90 MMHG | HEART RATE: 71 BPM | SYSTOLIC BLOOD PRESSURE: 134 MMHG | OXYGEN SATURATION: 96 %

## 2024-01-26 DIAGNOSIS — M20.001 FINGER DEFORMITY, RIGHT: ICD-10-CM

## 2024-01-26 DIAGNOSIS — M20.001 FINGER DEFORMITY, RIGHT: Primary | ICD-10-CM

## 2024-01-26 LAB
CRP SERPL-MCNC: <3 MG/L
ERYTHROCYTE [SEDIMENTATION RATE] IN BLOOD BY WESTERGREN METHOD: 10 MM/HR (ref 0–30)
RHEUMATOID FACT SERPL-ACNC: <10 IU/ML

## 2024-01-26 PROCEDURE — 86200 CCP ANTIBODY: CPT | Performed by: PHYSICIAN ASSISTANT

## 2024-01-26 PROCEDURE — 73120 X-RAY EXAM OF HAND: CPT | Mod: TC | Performed by: RADIOLOGY

## 2024-01-26 PROCEDURE — 86140 C-REACTIVE PROTEIN: CPT | Performed by: PHYSICIAN ASSISTANT

## 2024-01-26 PROCEDURE — 99204 OFFICE O/P NEW MOD 45 MIN: CPT | Performed by: PHYSICIAN ASSISTANT

## 2024-01-26 PROCEDURE — 36415 COLL VENOUS BLD VENIPUNCTURE: CPT | Performed by: PHYSICIAN ASSISTANT

## 2024-01-26 PROCEDURE — 86431 RHEUMATOID FACTOR QUANT: CPT | Performed by: PHYSICIAN ASSISTANT

## 2024-01-26 PROCEDURE — 85652 RBC SED RATE AUTOMATED: CPT | Performed by: PHYSICIAN ASSISTANT

## 2024-01-26 RX ORDER — TRAMADOL HYDROCHLORIDE 50 MG/1
TABLET ORAL
COMMUNITY
Start: 2023-08-15 | End: 2024-06-05

## 2024-01-26 RX ORDER — BUPROPION HYDROCHLORIDE 150 MG/1
150 TABLET ORAL DAILY
COMMUNITY
Start: 2024-01-08 | End: 2024-06-05

## 2024-01-26 NOTE — PATIENT INSTRUCTIONS
After Visit Instructions:     Thank you for coming to [facility] for your care. It is my goal to partner with you to help you reach your optimal state of health.       Plan:     Schedule follow-up with Jenna Oleary PA-C as needed  Imaging: xray hands  Labs: CCP antibody, Rheumatoid Factor, CRP, Sed Rate  Medication recommendations:   Look into Voltaren Gel-this is over the counter    Jenna Oleary PA-C  [facility]  [facility]    Contact information: [facility]  Clinic Number:  [phone]  Please call or send a [employer] message with any questions about your care

## 2024-01-26 NOTE — NURSING NOTE
"Initial BP (!) 134/90 (BP Location: Right arm, Patient Position: Right side, Cuff Size: Adult Large)   Pulse 71   LMP  (LMP Unknown)   SpO2 96%  Estimated body mass index is 27.34 kg/m  as calculated from the following:    Height as of 12/8/23: 1.638 m (5' 4.5\").    Weight as of 12/8/23: 73.4 kg (161 lb 12.8 oz). .    Auzl Cadena LPN on 1/26/2024 at 11:04 AM    "

## 2024-01-28 LAB — CCP AB SER IA-ACNC: 1.4 U/ML

## 2024-04-03 ENCOUNTER — ANCILLARY PROCEDURE (OUTPATIENT)
Dept: MAMMOGRAPHY | Facility: CLINIC | Age: 69
End: 2024-04-03
Attending: FAMILY MEDICINE
Payer: COMMERCIAL

## 2024-04-03 DIAGNOSIS — Z12.31 VISIT FOR SCREENING MAMMOGRAM: ICD-10-CM

## 2024-04-03 PROCEDURE — 77063 BREAST TOMOSYNTHESIS BI: CPT

## 2024-04-04 ENCOUNTER — ANCILLARY PROCEDURE (OUTPATIENT)
Dept: MAMMOGRAPHY | Facility: CLINIC | Age: 69
End: 2024-04-04
Attending: FAMILY MEDICINE
Payer: COMMERCIAL

## 2024-04-04 DIAGNOSIS — R92.1 BREAST CALCIFICATIONS: ICD-10-CM

## 2024-04-04 PROCEDURE — 77065 DX MAMMO INCL CAD UNI: CPT | Mod: LT

## 2024-04-09 ENCOUNTER — ANCILLARY PROCEDURE (OUTPATIENT)
Dept: MAMMOGRAPHY | Facility: CLINIC | Age: 69
End: 2024-04-09
Attending: FAMILY MEDICINE
Payer: COMMERCIAL

## 2024-04-09 DIAGNOSIS — R92.1 BREAST CALCIFICATIONS: ICD-10-CM

## 2024-04-09 PROCEDURE — 88305 TISSUE EXAM BY PATHOLOGIST: CPT | Mod: TC | Performed by: FAMILY MEDICINE

## 2024-04-09 PROCEDURE — 272N000715 MA STEREOTACTIC BREAST BIOPSY VACUUM LT

## 2024-04-09 PROCEDURE — 88305 TISSUE EXAM BY PATHOLOGIST: CPT | Mod: 26 | Performed by: PATHOLOGY

## 2024-04-09 PROCEDURE — 250N000009 HC RX 250: Performed by: FAMILY MEDICINE

## 2024-04-09 RX ORDER — LIDOCAINE HYDROCHLORIDE AND EPINEPHRINE 10; 10 MG/ML; UG/ML
10 INJECTION, SOLUTION INFILTRATION; PERINEURAL ONCE
Status: COMPLETED | OUTPATIENT
Start: 2024-04-09 | End: 2024-04-09

## 2024-04-09 RX ADMIN — LIDOCAINE HYDROCHLORIDE,EPINEPHRINE BITARTRATE 10 ML: 10; .01 INJECTION, SOLUTION INFILTRATION; PERINEURAL at 13:16

## 2024-04-09 RX ADMIN — LIDOCAINE HYDROCHLORIDE 10 ML: 10 INJECTION, SOLUTION INFILTRATION; PERINEURAL at 13:16

## 2024-04-09 NOTE — DISCHARGE INSTRUCTIONS
After Your Breast Biopsy    Bleeding, bruising, and pain  Breast tenderness and some bruising is normal and may last several days. You may wear your bra overnight to support the breast.  You may use an ice pack for pain. Place it over the area for 15 to 20 minutes, several times a day.  You may take over-the-counter pain medicine:  On the day of the biopsy, we recommend Tylenol (acetaminophen) because it does not raise your risk of bleeding.  The next day, you may take an anti-inflammatory medicine (aspirin, ibuprofen, Motrin, Aleve, Advil), unless your doctor tells you not to.  Bandages and showering  Keep your bandage in place until tomorrow morning. Don't get it wet.  If you have small pieces of tape on the skin, leave them in place. They will fall off on their own, or you can remove them after 5 days.  You may shower the next morning after your biopsy.  Activity  No heavy activity (no running, no gym workouts, no lifting, no vacuuming, etc.) on the day of your biopsy.  You may go back to normal activity the next day. But limit what you do if you still have pain or discomfort.  Infection  Infection is rare. Signs of infection include:  Fever (including sweats and chills)  Redness  Pain that gets worse  Fluid draining from the biopsy site  Biopsy results  Results may take up to 5 business days.  A nurse or doctor from the Breast Center will call with your results. The system sends the results to the doctor that ordered your biopsy & MyChart automatically.     If you have not gotten your results in 5 days, please call the Breast Center.  Call the Breast Center with questions or if:   You have bleeding that lasts more than 20 minutes.  You have pain that you can't control.  You have signs of infection (fever, sweats, chills, redness, increasing pain, or drainage).  After hours, please call the doctor who ordered your biopsy.     [facility] Nurse  [phone]    For informational purposes only. Not to replace the  advice of your health care provider. Copyright   2010 [facility]. All rights reserved. Clinically reviewed by Bonnie Escobar, Director, [facility]. [employer] [id number] - REV 08/23.

## 2024-04-10 LAB
PATH REPORT.COMMENTS IMP SPEC: NORMAL
PATH REPORT.FINAL DX SPEC: NORMAL
PATH REPORT.GROSS SPEC: NORMAL
PATH REPORT.MICROSCOPIC SPEC OTHER STN: NORMAL
PATH REPORT.RELEVANT HX SPEC: NORMAL
PHOTO IMAGE: NORMAL

## 2024-04-23 ENCOUNTER — PATIENT OUTREACH (OUTPATIENT)
Dept: CARE COORDINATION | Facility: CLINIC | Age: 69
End: 2024-04-23
Payer: COMMERCIAL

## 2024-05-07 ENCOUNTER — PATIENT OUTREACH (OUTPATIENT)
Dept: CARE COORDINATION | Facility: CLINIC | Age: 69
End: 2024-05-07
Payer: COMMERCIAL

## 2024-06-05 ENCOUNTER — OFFICE VISIT (OUTPATIENT)
Dept: FAMILY MEDICINE | Facility: CLINIC | Age: 69
End: 2024-06-05
Payer: COMMERCIAL

## 2024-06-05 VITALS
HEIGHT: 65 IN | TEMPERATURE: 98.3 F | HEART RATE: 75 BPM | OXYGEN SATURATION: 96 % | RESPIRATION RATE: 14 BRPM | BODY MASS INDEX: 27.09 KG/M2 | SYSTOLIC BLOOD PRESSURE: 127 MMHG | DIASTOLIC BLOOD PRESSURE: 77 MMHG | WEIGHT: 162.6 LBS

## 2024-06-05 DIAGNOSIS — A69.20 ERYTHEMA MIGRANS (LYME DISEASE): Primary | ICD-10-CM

## 2024-06-05 PROBLEM — C44.91 BASAL CELL CARCINOMA OF SKIN: Status: ACTIVE | Noted: 2021-08-04

## 2024-06-05 LAB
ERYTHROCYTE [DISTWIDTH] IN BLOOD BY AUTOMATED COUNT: 13 % (ref 10–15)
ERYTHROCYTE [SEDIMENTATION RATE] IN BLOOD BY WESTERGREN METHOD: 9 MM/HR (ref 0–30)
HCT VFR BLD AUTO: 40.8 % (ref 35–47)
HGB BLD-MCNC: 13.6 G/DL (ref 11.7–15.7)
MCH RBC QN AUTO: 29.4 PG (ref 26.5–33)
MCHC RBC AUTO-ENTMCNC: 33.3 G/DL (ref 31.5–36.5)
MCV RBC AUTO: 88 FL (ref 78–100)
PLATELET # BLD AUTO: 289 10E3/UL (ref 150–450)
RBC # BLD AUTO: 4.63 10E6/UL (ref 3.8–5.2)
WBC # BLD AUTO: 5.5 10E3/UL (ref 4–11)

## 2024-06-05 PROCEDURE — 85652 RBC SED RATE AUTOMATED: CPT | Performed by: FAMILY MEDICINE

## 2024-06-05 PROCEDURE — 85027 COMPLETE CBC AUTOMATED: CPT | Performed by: FAMILY MEDICINE

## 2024-06-05 PROCEDURE — 36415 COLL VENOUS BLD VENIPUNCTURE: CPT | Performed by: FAMILY MEDICINE

## 2024-06-05 PROCEDURE — 86618 LYME DISEASE ANTIBODY: CPT | Performed by: FAMILY MEDICINE

## 2024-06-05 PROCEDURE — 99214 OFFICE O/P EST MOD 30 MIN: CPT | Performed by: FAMILY MEDICINE

## 2024-06-05 PROCEDURE — 80053 COMPREHEN METABOLIC PANEL: CPT | Performed by: FAMILY MEDICINE

## 2024-06-05 RX ORDER — RESPIRATORY SYNCYTIAL VIRUS VACCINE 120MCG/0.5
0.5 KIT INTRAMUSCULAR ONCE
Qty: 1 EACH | Refills: 0 | Status: CANCELLED | OUTPATIENT
Start: 2024-06-05 | End: 2024-06-05

## 2024-06-05 RX ORDER — DOXYCYCLINE 100 MG/1
100 CAPSULE ORAL 2 TIMES DAILY
Qty: 20 CAPSULE | Refills: 0 | Status: SHIPPED | OUTPATIENT
Start: 2024-06-05 | End: 2024-06-15

## 2024-06-05 NOTE — PROGRESS NOTES
"  Assessment & Plan   Problem List Items Addressed This Visit          Musculoskeletal and Integumentary    Erythema migrans (Lyme disease) - Primary     Pt with right abdominal rapidly expanding erythematous lesion that did have target appearance yesterday. She has had a deer tick bite in the past 3-4 weeks (she is not sure of exact date).     She feels fairly well, but has noted she is a bit tired (she attributed this to doing more yard work lately) and she has a little bit of left neck stiffness (attributed to sleeping weird) but with the target like lesion she is now concerned about lyme.     Will tx with doxy, cautioned about sun - she will stay out of sun   Will also get baseline labs.   Follow up prn.         Relevant Medications    doxycycline hyclate (VIBRAMYCIN) 100 MG capsule    Other Relevant Orders    Lyme Disease Total Abs Bld with Reflex to Confirm CLIA    CBC with platelets    Comprehensive metabolic panel (BMP + Alb, Alk Phos, ALT, AST, Total. Bili, TP)    ESR: Erythrocyte sedimentation rate         BMI  Estimated body mass index is 27.48 kg/m  as calculated from the following:    Height as of this encounter: 1.638 m (5' 4.5\").    Weight as of this encounter: 73.8 kg (162 lb 9.6 oz).     Shelly Kaiser is a 69 year old, presenting for the following health issues:  Symptoms (Concern of Lymes Disease: got about 6 tick bite with in the last month, notice large itchy rash on right upper quadrant last evening.  ) and Imm/Inj (Will get vaccines at annual wellness visit. )        6/5/2024     7:44 AM   Additional Questions   Roomed by David Angel MA   Accompanied by Self         6/5/2024     7:44 AM   Patient Reported Additional Medications   Patient reports taking the following new medications None     History of Present Illness       Reason for visit:  Swelling and rash from possible tick bite  Symptom onset:  1-3 days ago    She eats 0-1 servings of fruits and vegetables daily.She consumes 0 " "sweetened beverage(s) daily.She exercises with enough effort to increase her heart rate 9 or less minutes per day.  She exercises with enough effort to increase her heart rate 3 or less days per week.   She is taking medications regularly.           Objective    /77 (BP Location: Left arm, Patient Position: Sitting, Cuff Size: Adult Regular)   Pulse 75   Temp 98.3  F (36.8  C) (Oral)   Resp 14   Ht 1.638 m (5' 4.5\")   Wt 73.8 kg (162 lb 9.6 oz)   LMP  (LMP Unknown)   SpO2 96%   BMI 27.48 kg/m    Body mass index is 27.48 kg/m .  Physical Exam  Constitutional:       Appearance: Normal appearance.   HENT:      Head: Normocephalic and atraumatic.   Cardiovascular:      Rate and Rhythm: Normal rate and regular rhythm.   Pulmonary:      Effort: Pulmonary effort is normal.   Musculoskeletal:         General: Normal range of motion.      Cervical back: Normal range of motion and neck supple.   Skin:     Findings: Lesion (right abdominal lesion measuring 7 cm in diameter with central gandhi which measures 4mm.) present.          Neurological:      General: No focal deficit present.      Mental Status: She is alert and oriented to person, place, and time.              Signed Electronically by: Carol Dickinson MD    "

## 2024-06-05 NOTE — ASSESSMENT & PLAN NOTE
Pt with right abdominal rapidly expanding erythematous lesion that did have target appearance yesterday. She has had a deer tick bite in the past 3-4 weeks (she is not sure of exact date).     She feels fairly well, but has noted she is a bit tired (she attributed this to doing more yard work lately) and she has a little bit of left neck stiffness (attributed to sleeping weird) but with the target like lesion she is now concerned about lyme.     Will tx with doxy, cautioned about sun - she will stay out of sun   Will also get baseline labs.   Follow up prn.

## 2024-06-06 LAB
ALBUMIN SERPL BCG-MCNC: 4.3 G/DL (ref 3.5–5.2)
ALP SERPL-CCNC: 78 U/L (ref 40–150)
ALT SERPL W P-5'-P-CCNC: 12 U/L (ref 0–50)
ANION GAP SERPL CALCULATED.3IONS-SCNC: 15 MMOL/L (ref 7–15)
AST SERPL W P-5'-P-CCNC: 27 U/L (ref 0–45)
B BURGDOR IGG+IGM SER QL: 0.04
BILIRUB SERPL-MCNC: 0.3 MG/DL
BUN SERPL-MCNC: 11 MG/DL (ref 8–23)
CALCIUM SERPL-MCNC: 9.5 MG/DL (ref 8.8–10.2)
CHLORIDE SERPL-SCNC: 102 MMOL/L (ref 98–107)
CREAT SERPL-MCNC: 0.86 MG/DL (ref 0.51–0.95)
DEPRECATED HCO3 PLAS-SCNC: 21 MMOL/L (ref 22–29)
EGFRCR SERPLBLD CKD-EPI 2021: 74 ML/MIN/1.73M2
GLUCOSE SERPL-MCNC: 90 MG/DL (ref 70–99)
POTASSIUM SERPL-SCNC: 4.3 MMOL/L (ref 3.4–5.3)
PROT SERPL-MCNC: 7.5 G/DL (ref 6.4–8.3)
SODIUM SERPL-SCNC: 138 MMOL/L (ref 135–145)

## 2024-06-17 ENCOUNTER — LAB REQUISITION (OUTPATIENT)
Dept: LAB | Facility: CLINIC | Age: 69
End: 2024-06-17
Payer: COMMERCIAL

## 2024-06-17 DIAGNOSIS — Z13.29 ENCOUNTER FOR SCREENING FOR OTHER SUSPECTED ENDOCRINE DISORDER: ICD-10-CM

## 2024-06-17 DIAGNOSIS — Z13.1 ENCOUNTER FOR SCREENING FOR DIABETES MELLITUS: ICD-10-CM

## 2024-06-17 DIAGNOSIS — Z13.220 ENCOUNTER FOR SCREENING FOR LIPOID DISORDERS: ICD-10-CM

## 2024-06-17 LAB
CHOLEST SERPL-MCNC: 225 MG/DL
FASTING STATUS PATIENT QL REPORTED: YES
HBA1C MFR BLD: 5.9 %
HDLC SERPL-MCNC: 73 MG/DL
LDLC SERPL CALC-MCNC: 134 MG/DL
NONHDLC SERPL-MCNC: 152 MG/DL
TRIGL SERPL-MCNC: 89 MG/DL
TSH SERPL DL<=0.005 MIU/L-ACNC: 1.83 UIU/ML (ref 0.3–4.2)

## 2024-06-17 PROCEDURE — 80061 LIPID PANEL: CPT | Mod: ORL | Performed by: OBSTETRICS & GYNECOLOGY

## 2024-06-17 PROCEDURE — 84443 ASSAY THYROID STIM HORMONE: CPT | Mod: ORL | Performed by: OBSTETRICS & GYNECOLOGY

## 2024-06-17 PROCEDURE — 83036 HEMOGLOBIN GLYCOSYLATED A1C: CPT | Mod: ORL | Performed by: OBSTETRICS & GYNECOLOGY

## 2024-06-19 ENCOUNTER — TELEPHONE (OUTPATIENT)
Dept: FAMILY MEDICINE | Facility: CLINIC | Age: 69
End: 2024-06-19
Payer: COMMERCIAL

## 2024-06-19 NOTE — TELEPHONE ENCOUNTER
Patient Quality Outreach    Patient is due for the following:   Physical Annual Wellness Visit    Next Steps:   Schedule a Annual Wellness Visit    Type of outreach:    Phone, spoke to patient/parent. Patient    Next Steps:  Reach out within 90 days via Phone.    Max number of attempts reached: No. Will try again in 90 days if patient still on fail list.    Questions for provider review:    None           David Angel MA  Chart routed to self.

## 2024-06-21 DIAGNOSIS — G47.33 OSA (OBSTRUCTIVE SLEEP APNEA): Primary | ICD-10-CM

## 2024-07-21 ENCOUNTER — HEALTH MAINTENANCE LETTER (OUTPATIENT)
Age: 69
End: 2024-07-21

## 2024-11-19 ENCOUNTER — PATIENT OUTREACH (OUTPATIENT)
Dept: CARE COORDINATION | Facility: CLINIC | Age: 69
End: 2024-11-19
Payer: COMMERCIAL

## 2024-12-11 ENCOUNTER — E-CONSULT (OUTPATIENT)
Dept: MULTI SPECIALTY CLINIC | Facility: CLINIC | Age: 69
End: 2024-12-11

## 2024-12-11 ENCOUNTER — OFFICE VISIT (OUTPATIENT)
Dept: FAMILY MEDICINE | Facility: CLINIC | Age: 69
End: 2024-12-11
Payer: COMMERCIAL

## 2024-12-11 VITALS
HEIGHT: 64 IN | DIASTOLIC BLOOD PRESSURE: 88 MMHG | OXYGEN SATURATION: 98 % | TEMPERATURE: 98.5 F | RESPIRATION RATE: 16 BRPM | BODY MASS INDEX: 26.21 KG/M2 | SYSTOLIC BLOOD PRESSURE: 137 MMHG | WEIGHT: 153.56 LBS | HEART RATE: 77 BPM

## 2024-12-11 DIAGNOSIS — G47.00 INSOMNIA, UNSPECIFIED TYPE: ICD-10-CM

## 2024-12-11 DIAGNOSIS — Z00.00 HEALTH CARE MAINTENANCE: Primary | ICD-10-CM

## 2024-12-11 DIAGNOSIS — E78.5 DYSLIPIDEMIA: ICD-10-CM

## 2024-12-11 DIAGNOSIS — G25.81 RESTLESS LEGS SYNDROME: ICD-10-CM

## 2024-12-11 DIAGNOSIS — M81.0 SENILE OSTEOPOROSIS: ICD-10-CM

## 2024-12-11 DIAGNOSIS — Z23 NEED FOR SHINGLES VACCINE: ICD-10-CM

## 2024-12-11 DIAGNOSIS — C44.91 BASAL CELL CARCINOMA (BCC), UNSPECIFIED SITE: ICD-10-CM

## 2024-12-11 DIAGNOSIS — Z00.00 ENCOUNTER FOR MEDICARE ANNUAL WELLNESS EXAM: ICD-10-CM

## 2024-12-11 DIAGNOSIS — K76.0 HEPATIC STEATOSIS: ICD-10-CM

## 2024-12-11 DIAGNOSIS — F41.1 ANXIETY STATE: ICD-10-CM

## 2024-12-11 DIAGNOSIS — E04.2 MULTIPLE THYROID NODULES: ICD-10-CM

## 2024-12-11 DIAGNOSIS — R73.03 PREDIABETES: ICD-10-CM

## 2024-12-11 DIAGNOSIS — F50.819 BINGE EATING DISORDER, UNSPECIFIED SEVERITY: ICD-10-CM

## 2024-12-11 DIAGNOSIS — E66.3 OVERWEIGHT WITH BODY MASS INDEX (BMI) OF 26 TO 26.9 IN ADULT: ICD-10-CM

## 2024-12-11 PROBLEM — A69.20 ERYTHEMA MIGRANS (LYME DISEASE): Status: RESOLVED | Noted: 2024-06-05 | Resolved: 2024-12-11

## 2024-12-11 PROCEDURE — 99207 E-CONSULT TO GASTROENTEROLOGY (ADULT OUTPT PROVIDER TO SPECIALIST WRITTEN QUESTION & RESPONSE): CPT | Performed by: FAMILY MEDICINE

## 2024-12-11 PROCEDURE — G0008 ADMIN INFLUENZA VIRUS VAC: HCPCS | Performed by: FAMILY MEDICINE

## 2024-12-11 PROCEDURE — 90662 IIV NO PRSV INCREASED AG IM: CPT | Performed by: FAMILY MEDICINE

## 2024-12-11 PROCEDURE — 91320 SARSCV2 VAC 30MCG TRS-SUC IM: CPT | Performed by: FAMILY MEDICINE

## 2024-12-11 PROCEDURE — 90480 ADMN SARSCOV2 VAC 1/ONLY CMP: CPT | Performed by: FAMILY MEDICINE

## 2024-12-11 PROCEDURE — 99214 OFFICE O/P EST MOD 30 MIN: CPT | Mod: 25 | Performed by: FAMILY MEDICINE

## 2024-12-11 PROCEDURE — G0439 PPPS, SUBSEQ VISIT: HCPCS | Performed by: FAMILY MEDICINE

## 2024-12-11 RX ORDER — PRAMIPEXOLE DIHYDROCHLORIDE 0.25 MG/1
0.25 TABLET ORAL AT BEDTIME
Qty: 90 TABLET | Refills: 3 | Status: SHIPPED | OUTPATIENT
Start: 2024-12-11

## 2024-12-11 RX ORDER — METFORMIN HYDROCHLORIDE 500 MG/1
500 TABLET, EXTENDED RELEASE ORAL
Qty: 90 TABLET | Refills: 1 | Status: CANCELLED | OUTPATIENT
Start: 2024-12-11

## 2024-12-11 RX ORDER — ROSUVASTATIN CALCIUM 5 MG/1
5 TABLET, COATED ORAL DAILY
Qty: 90 TABLET | Refills: 3 | Status: SHIPPED | OUTPATIENT
Start: 2024-12-11

## 2024-12-11 SDOH — HEALTH STABILITY: PHYSICAL HEALTH: ON AVERAGE, HOW MANY MINUTES DO YOU ENGAGE IN EXERCISE AT THIS LEVEL?: 20 MIN

## 2024-12-11 SDOH — HEALTH STABILITY: PHYSICAL HEALTH

## 2024-12-11 SDOH — HEALTH STABILITY: PHYSICAL HEALTH: ON AVERAGE, HOW MANY DAYS PER WEEK DO YOU ENGAGE IN MODERATE TO STRENUOUS EXERCISE (LIKE A BRISK WALK)?: 1 DAY

## 2024-12-11 ASSESSMENT — SOCIAL DETERMINANTS OF HEALTH (SDOH): HOW OFTEN DO YOU GET TOGETHER WITH FRIENDS OR RELATIVES?: TWICE A WEEK

## 2024-12-11 NOTE — ASSESSMENT & PLAN NOTE
Mental health including anxiety, insomnia, and eating disorder managed by [facility] psychiatry nurse practitioner-she continue Zoloft, trazodone, Vistaril through them.  I will defer to them at this point.

## 2024-12-11 NOTE — ASSESSMENT & PLAN NOTE
Current BMI of 26.11 which is improving.  She is encouraged to continue continue healthy lifestyle

## 2024-12-11 NOTE — ASSESSMENT & PLAN NOTE
Continue to work on healthy lifestyle.   calculated FIB-4 score is >1.45, then you can order a FibroScan to evaluate for advanced hepatic fibrosis and rule in/out cirrhosis.  FIB-4 Calculation: 1.81 at 6/5/2024  8:27 AM  Calculated from:  SGOT/AST: 27 U/L at 6/5/2024  8:27 AM  SGPT/ALT: 12 U/L at 6/5/2024  8:27 AM  Platelets: 289 10e3/uL at 6/5/2024  8:27 AM  Age: 69 years     Fibro scan ordered. Econsult to hepatology offered.

## 2024-12-11 NOTE — ASSESSMENT & PLAN NOTE
Mental health including anxiety, insomnia, and eating disorder managed by [facility] psychiatry nurse practitioner-she continue Zoloft, trazodone, Vistaril  through them.  I will defer to them at this point.

## 2024-12-11 NOTE — PROGRESS NOTES
Preventive Care Visit  [facility]  Carol Dickinson MD, Family Medicine  Dec 11, 2024      Assessment & Plan  Health care maintenance  Contraception - menopause  Recommended vaccines: Zoster, pneumonia, and she did flu and COVID today.  Pap: No indication  Mammo: Biopsy last spring.  A follow-up mammogram is recommended 4/2025.  Colonoscopy: Repeat screening colonoscopy due 2026  Std testing desired:  offered  Osteoporosis prevention discussed.  vitamin d levels ordered. Recommend daily calcium and vitamin d intake to keep good bone health. Recommend weight bearing exercise, no tobacco, and limit alcohol  dexa the osteoporosis in the problem list  Recommend sunscreen, exercise, & healthy diet.  Offered cbc, cmp, lipids and asked what other testing she  desires today  I have had an Advance Directives discussion with the patient.   Body mass index is 26.11 kg/m .   mychart offered.           Prediabetes  A1c 6/2024 was 5.8.  Information on prediabetes was put in the chart.  We discussed using metformin off label to prevent progression to diabetes.  She recently lost weight so declined metformin and wanted to recheck A1c at the 6 month denise.         Hepatic steatosis  Continue to work on healthy lifestyle.   calculated FIB-4 score is >1.45, then you can order a FibroScan to evaluate for advanced hepatic fibrosis and rule in/out cirrhosis.  FIB-4 Calculation: 1.81 at 6/5/2024  8:27 AM  Calculated from:  SGOT/AST: 27 U/L at 6/5/2024  8:27 AM  SGPT/ALT: 12 U/L at 6/5/2024  8:27 AM  Platelets: 289 10e3/uL at 6/5/2024  8:27 AM  Age: 69 years     Fibro scan ordered. Econsult to hepatology offered.          Dyslipidemia  Dyslipidemia, repeat lipids, healthy lifestyle discussed.   Statin recommended. Order placed for crestor 5 mg po q day.   Coronary calcium score discussed.     The 10-year ASCVD risk score (Nicole NEWELL, et al., 2019) is: 7.5%    Values used to calculate the score:      Age: 69 years       Sex: Female      Is Non- : No      Diabetic: No      Tobacco smoker: No      Systolic Blood Pressure: 137 mmHg      Is BP treated: No      HDL Cholesterol: 73 mg/dL      Total Cholesterol: 225 mg/dL          Anxiety state  Mental health including anxiety, insomnia, and eating disorder managed by [facility] psychiatry nurse practitioner-she continue Zoloft, trazodone, Vistaril through them.  I will defer to them at this point.         Insomnia, unspecified type  Mental health including anxiety, insomnia, and eating disorder managed by [facility] psychiatry nurse practitioner-she continue Zoloft, trazodone, Vistaril through them.  I will defer to them at this point.         Multiple thyroid nodules  Monitoring clinically. Tsh was normal 6/2024         Overweight with body mass index (BMI) of 26 to 26.9 in adult  Current BMI of 26.11 which is improving.  She is encouraged to continue continue healthy lifestyle         Restless legs syndrome  Restless leg syndrome Needs refill of mirapex. No side effects noted.           Binge eating disorder, unspecified severity  Mental health including anxiety, insomnia, and eating disorder managed by [facility] psychiatry nurse practitioner-she continue Zoloft, trazodone, Vistaril  through them.  I will defer to them at this point.         Senile osteoporosis  Osteoporosis Sees Dr. Mckeon          Encounter for Medicare annual wellness exam         Need for shingles vaccine               Shelly Kaiser is a 69 year old, presenting for the following:  Wellness Visit (Pt. Nonfasting.) and Derm Problem (LT cheek area. Rash, hard lumps and drainage. Has annual dermatologist appt. next week.)        12/11/2024     2:56 PM   Additional Questions   Roomed by sac   Accompanied by self         12/11/2024     2:56 PM   Patient Reported Additional Medications   Patient reports taking the following new medications no       Health Care  Directive  Patient does not have a Health Care Directive: Discussed advance care planning with patient; however, patient declined at this time.      12/11/2024   General Health   How would you rate your overall physical health? 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 No   Do you have housing? (Housing is defined as stable permanent housing and does not include staying ouside in a car, in a tent, in an abandoned building, in an overnight shelter, or couch-surfing.) Yes   Are you worried about losing your housing? No   Lack of transportation? No   Unable to get utilities (heat,electricity)? No            12/11/2024   Fall Risk   Fallen 2 or more times in the past year? No    Trouble with walking or balance? No        Patient-reported          12/11/2024   Activities of Daily Living- Home Safety   Needs help with the following daily activites None of the above   Safety concerns in the home None of the above            12/11/2024   Dental   Dentist two times every year? Yes            12/11/2024   Hearing Screening   Hearing concerns? None of the above            12/11/2024   Driving Risk Screening   Patient/family members have concerns about driving No            12/11/2024   General Alertness/Fatigue Screening   Have you been more tired than usual lately? No            12/11/2024   Urinary Incontinence Screening    Bothered by leaking urine in past 6 months No            12/11/2024   TB Screening   Were you born outside of the [place]? No            Today's PHQ-2 Score:       12/11/2024     2:40 PM   PHQ-2 ( 1999 [employer])   Q1: Little interest or pleasure in doing things 0    Q2: Feeling down, depressed or hopeless 0    PHQ-2 Score 0    Q1: Little interest or pleasure in doing things Not at all   Q2: Feeling down, depressed or hopeless Not at all   PHQ-2 Score 0       Patient-reported           12/11/2024   Substance Use   Alcohol more than 3/day or more than 7/wk No   Do you have a current opioid prescription? No   How severe/bad is pain from 1 to 10? 2/10   Do you use any other substances recreationally? (!) OTHER - she is using otc cbd.         Social History     Tobacco Use    Smoking status: Never     Passive exposure: Never    Smokeless tobacco: Never   Vaping Use    Vaping status: Never Used   Substance Use Topics    Alcohol use: Yes     Comment: Alcoholic Drinks/day: rarely    Drug use: No           4/3/2024   LAST FHS-7 RESULTS   1st degree relative breast or ovarian cancer No   Any relative bilateral breast cancer No   Any male have breast cancer No   Any ONE woman have BOTH breast AND ovarian cancer No   Any woman with breast cancer before 50yrs No   2 or more relatives with breast AND/OR ovarian cancer No   2 or more relatives with breast AND/OR bowel cancer No        Mammogram Screening - Mammogram every 1-2 years updated in Health Maintenance based on mutual decision making      History of abnormal Pap smear: No - age 65 or older with adequate negative prior screening test results (3 consecutive negative cytology results, 2 consecutive negative cotesting results, or 2 consecutive negative HrHPV test results within 10 years, with the most recent test occurring within the recommended screening interval for the test used)       ASCVD Risk   The 10-year ASCVD risk score (Nicole DK, et al., 2019) is: 7.5%     "Values used to calculate the score:      Age: 69 years      Sex: Female      Is Non- : No      Diabetic: No      Tobacco smoker: No      Systolic Blood Pressure: 137 mmHg      Is BP treated: No      HDL Cholesterol: 73 mg/dL      Total Cholesterol: 225 mg/dL      Reviewed and updated as needed this visit by Provider   Tobacco  Allergies  Meds  Problems  Med Hx  Surg Hx  Fam Hx              Current providers sharing in care for this patient include:  Patient Care Team:  Carol Dickinson MD as PCP - General (Family Practice)  Carol Dickinson MD as Assigned PCP  Votel, Susu Savage MD as MD (OB/Gyn)  Annabelle Kong MD as MD (Otolaryngology)  Josy Healy NP as Nurse Practitioner  Mamta, Jenna Chakraborty PA-C as Assigned Rheumatology Provider    The following health maintenance items are reviewed in Epic and correct as of today:  Health Maintenance   Topic Date Due    ZOSTER IMMUNIZATION (1 of 2) Never done    Pneumococcal Vaccine: 65+ Years (2 of 2 - PPSV23 or PCV20) 03/03/2022    ANNUAL REVIEW OF HM ORDERS  06/05/2025    LIPID  06/17/2025    MEDICARE ANNUAL WELLNESS VISIT  12/11/2025    FALL RISK ASSESSMENT  12/11/2025    MAMMO SCREENING  04/04/2026    COLORECTAL CANCER SCREENING  08/22/2026    GLUCOSE  06/05/2027    ADVANCE CARE PLANNING  05/23/2028    DTAP/TDAP/TD IMMUNIZATION (3 - Td or Tdap) 10/31/2028    RSV VACCINE (1 - 1-dose 75+ series) 03/03/2032    DEXA  11/17/2038    HEPATITIS C SCREENING  Completed    PHQ-2 (once per calendar year)  Completed    INFLUENZA VACCINE  Completed    COVID-19 Vaccine  Completed    HPV IMMUNIZATION  Aged Out    MENINGITIS IMMUNIZATION  Aged Out    RSV MONOCLONAL ANTIBODY  Aged Out            Objective    Exam  /88 (BP Location: Left arm, Patient Position: Sitting, Cuff Size: Adult Regular)   Pulse 77   Temp 98.5  F (36.9  C) (Oral)   Resp 16   Ht 1.633 m (5' 4.3\")   Wt 69.7 kg (153 lb 9 oz)   LMP  (LMP Unknown)   " "SpO2 98%   BMI 26.11 kg/m     Estimated body mass index is 26.11 kg/m  as calculated from the following:    Height as of this encounter: 1.633 m (5' 4.3\").    Weight as of this encounter: 69.7 kg (153 lb 9 oz).    Physical Exam  Constitutional:       Appearance: Normal appearance.   HENT:      Head: Normocephalic and atraumatic.      Right Ear: Tympanic membrane, ear canal and external ear normal.      Left Ear: Tympanic membrane, ear canal and external ear normal.      Nose: Nose normal.      Mouth/Throat:      Mouth: Mucous membranes are moist.      Pharynx: Oropharynx is clear.   Eyes:      Conjunctiva/sclera: Conjunctivae normal.      Pupils: Pupils are equal, round, and reactive to light.   Cardiovascular:      Rate and Rhythm: Normal rate and regular rhythm.      Heart sounds: Normal heart sounds.   Pulmonary:      Effort: Pulmonary effort is normal.      Breath sounds: Normal breath sounds.   Abdominal:      General: Bowel sounds are normal.      Palpations: Abdomen is soft.   Musculoskeletal:         General: Normal range of motion.      Cervical back: Normal range of motion and neck supple.   Neurological:      General: No focal deficit present.      Mental Status: She is alert and oriented to person, place, and time.               12/11/2024   Mini Cog   Clock Draw Score 2 Normal   3 Item Recall 3 objects recalled   Mini Cog Total Score 5               Signed Electronically by: Carol Dickinson MD    "

## 2024-12-11 NOTE — ASSESSMENT & PLAN NOTE
Contraception - menopause  Recommended vaccines: Zoster, pneumonia, and she did flu and COVID today.  Pap: No indication  Mammo: Biopsy last spring.  A follow-up mammogram is recommended 4/2025.  Colonoscopy: Repeat screening colonoscopy due 2026  Std testing desired:  offered  Osteoporosis prevention discussed.  vitamin d levels ordered. Recommend daily calcium and vitamin d intake to keep good bone health. Recommend weight bearing exercise, no tobacco, and limit alcohol  dexa the osteoporosis in the problem list  Recommend sunscreen, exercise, & healthy diet.  Offered cbc, cmp, lipids and asked what other testing she  desires today  I have had an Advance Directives discussion with the patient.   Body mass index is 26.11 kg/m .   mychart offered.

## 2024-12-11 NOTE — ASSESSMENT & PLAN NOTE
Mental health including anxiety, insomnia, and eating disorder managed by [facility] psychiatry nurse practitioner-she continue Zoloft, trazodone, Vistaril through them.  I will defer to them at this point.

## 2024-12-11 NOTE — PATIENT INSTRUCTIONS
Patient Education   Preventive Care Advice   This is general advice given by our system to help you stay healthy. However, your care team may have specific advice just for you. Please talk to your care team about your preventive care needs.  Nutrition  Eat 5 or more servings of fruits and vegetables each day.  Try wheat bread, brown rice and whole grain pasta (instead of white bread, rice, and pasta).  Get enough calcium and vitamin D. Check the label on foods and aim for 100% of the RDA (recommended daily allowance).  Lifestyle  Exercise at least 150 minutes each week  (30 minutes a day, 5 days a week).  Do muscle strengthening activities 2 days a week. These help control your weight and prevent disease.  No smoking.  Wear sunscreen to prevent skin cancer.  Have a dental exam and cleaning every 6 months.  Yearly exams  See your health care team every year to talk about:  Any changes in your health.  Any medicines your care team has prescribed.  Preventive care, family planning, and ways to prevent chronic diseases.  Shots (vaccines)   HPV shots (up to age 26), if you've never had them before.  Hepatitis B shots (up to age 59), if you've never had them before.  COVID-19 shot: Get this shot when it's due.  Flu shot: Get a flu shot every year.  Tetanus shot: Get a tetanus shot every 10 years.  Pneumococcal, hepatitis A, and RSV shots: Ask your care team if you need these based on your risk.  Shingles shot (for age 50 and up)  General health tests  Diabetes screening:  Starting at age 35, Get screened for diabetes at least every 3 years.  If you are younger than age 35, ask your care team if you should be screened for diabetes.  Cholesterol test: At age 39, start having a cholesterol test every 5 years, or more often if advised.  Bone density scan (DEXA): At age 50, ask your care team if you should have this scan for osteoporosis (brittle bones).  Hepatitis C: Get tested at least once in your life.  STIs (sexually  transmitted infections)  Before age 24: Ask your care team if you should be screened for STIs.  After age 24: Get screened for STIs if you're at risk. You are at risk for STIs (including HIV) if:  You are sexually active with more than one person.  You don't use condoms every time.  You or a partner was diagnosed with a sexually transmitted infection.  If you are at risk for HIV, ask about PrEP medicine to prevent HIV.  Get tested for HIV at least once in your life, whether you are at risk for HIV or not.  Cancer screening tests  Cervical cancer screening: If you have a cervix, begin getting regular cervical cancer screening tests starting at age 21.  Breast cancer scan (mammogram): If you've ever had breasts, begin having regular mammograms starting at age 40. This is a scan to check for breast cancer.  Colon cancer screening: It is important to start screening for colon cancer at age 45.  Have a colonoscopy test every 10 years (or more often if you're at risk) Or, ask your provider about stool tests like a FIT test every year or Cologuard test every 3 years.  To learn more about your testing options, visit:   .  For help making a decision, visit:   https://bit.ly/st90510.  Prostate cancer screening test: If you have a prostate, ask your care team if a prostate cancer screening test (PSA) at age 55 is right for you.  Lung cancer screening: If you are a current or former smoker ages 50 to 80, ask your care team if ongoing lung cancer screenings are right for you.  For informational purposes only. Not to replace the advice of your health care provider. Copyright   2023 [facility]. All rights reserved. Clinically reviewed by the [facility]. Nazar [id number] - REV 01/24.  Learning About Stress  What is stress?     Stress is your body's response to a hard situation. Your body can have a physical, emotional, or mental response. Stress is a fact of life for most people, and it  affects everyone differently. What causes stress for you may not be stressful for someone else.  A lot of things can cause stress. You may feel stress when you go on a job interview, take a test, or run a race. This kind of short-term stress is normal and even useful. It can help you if you need to work hard or react quickly. For example, stress can help you finish an important job on time.  Long-term stress is caused by ongoing stressful situations or events. Examples of long-term stress include long-term health problems, ongoing problems at work, or conflicts in your family. Long-term stress can harm your health.  How does stress affect your health?  When you are stressed, your body responds as though you are in danger. It makes hormones that speed up your heart, make you breathe faster, and give you a burst of energy. This is called the fight-or-flight stress response. If the stress is over quickly, your body goes back to normal and no harm is done.  But if stress happens too often or lasts too long, it can have bad effects. Long-term stress can make you more likely to get sick, and it can make symptoms of some diseases worse. If you tense up when you are stressed, you may develop neck, shoulder, or low back pain. Stress is linked to high blood pressure and heart disease.  Stress also harms your emotional health. It can make you saunders, tense, or depressed. Your relationships may suffer, and you may not do well at work or school.  What can you do to manage stress?  You can try these things to help manage stress:   Do something active. Exercise or activity can help reduce stress. Walking is a great way to get started. Even everyday activities such as housecleaning or yard work can help.  Try yoga or mariela chi. These techniques combine exercise and meditation. You may need some training at first to learn them.  Do something you enjoy. For example, listen to music or go to a movie. Practice your hobby or do volunteer  "work.  Meditate. This can help you relax, because you are not worrying about what happened before or what may happen in the future.  Do guided imagery. Imagine yourself in any setting that helps you feel calm. You can use online videos, books, or a teacher to guide you.  Do breathing exercises. For example:  From a standing position, bend forward from the waist with your knees slightly bent. Let your arms dangle close to the floor.  Breathe in slowly and deeply as you return to a standing position. Roll up slowly and lift your head last.  Hold your breath for just a few seconds in the standing position.  Breathe out slowly and bend forward from the waist.  Let your feelings out. Talk, laugh, cry, and express anger when you need to. Talking with supportive friends or family, a counselor, or a jason leader about your feelings is a healthy way to relieve stress. Avoid discussing your feelings with people who make you feel worse.  Write. It may help to write about things that are bothering you. This helps you find out how much stress you feel and what is causing it. When you know this, you can find better ways to cope.  What can you do to prevent stress?  You might try some of these things to help prevent stress:  Manage your time. This helps you find time to do the things you want and need to do.  Get enough sleep. Your body recovers from the stresses of the day while you are sleeping.  Get support. Your family, friends, and community can make a difference in how you experience stress.  Limit your news feed. Avoid or limit time on social media or news that may make you feel stressed.  Do something active. Exercise or activity can help reduce stress. Walking is a great way to get started.  Where can you learn more?  Go to https://www.[employer].net/patiented  Enter N032 in the search box to learn more about \"Learning About Stress.\"  Current as of: October 24, 2023  Content Version: 14.2 2024 [employer]. "   Care instructions adapted under license by your healthcare professional. If you have questions about a medical condition or this instruction, always ask your healthcare professional. Health[employer], [employer] disclaims any warranty or liability for your use of this information.    Substance Use Disorder: Care Instructions  Overview     You can improve your life and health by stopping your use of alcohol or drugs. When you don't drink or use drugs, you may feel and sleep better. You may get along better with your family, friends, and coworkers. There are medicines and programs that can help with substance use disorder.  How can you care for yourself at home?  Here are some ways to help you stay sober and prevent relapse.  If you have been given medicine to help keep you sober or reduce your cravings, be sure to take it exactly as prescribed.  Talk to your doctor about programs that can help you stop using drugs or drinking alcohol.  Do not keep alcohol or drugs in your home.  Plan ahead. Think about what you'll say if other people ask you to drink or use drugs. Try not to spend time with people who drink or use drugs.  Use the time and money spent on drinking or drugs to do something that's important to you.  Preventing a relapse  Have a plan to deal with relapse. Learn to recognize changes in your thinking that lead you to drink or use drugs. Get help before you start to drink or use drugs again.  Try to stay away from situations, friends, or places that may lead you to drink or use drugs.  If you feel the need to drink alcohol or use drugs again, seek help right away. Call a trusted friend or family member. Some people get support from organizations such as Narcotics Anonymous or [employer] or from treatment facilities.  If you relapse, get help as soon as you can. Some people make a plan with another person that outlines what they want that person to do for them if they relapse. The plan usually includes how  to handle the relapse and who to notify in case of relapse.  Don't give up. Remember that a relapse doesn't mean that you have failed. Use the experience to learn the triggers that lead you to drink or use drugs. Then quit again. Recovery is a lifelong process. Many people have several relapses before they are able to quit for good.  Follow-up care is a key part of your treatment and safety. Be sure to make and go to all appointments, and call your doctor if you are having problems. It's also a good idea to know your test results and keep a list of the medicines you take.  When should you call for help?   Call 911  anytime you think you may need emergency care. For example, call if you or someone else:    Has overdosed or has withdrawal signs. Be sure to tell the emergency workers that you are or someone else is using or trying to quit using drugs. Overdose or withdrawal signs may include:  Losing consciousness.  Seizure.  Seeing or hearing things that aren't there (hallucinations).     Is thinking or talking about suicide or harming others.   Where to get help 24 hours a day, 7 days a week   If you or someone you know talks about suicide, self-harm, a mental health crisis, a substance use crisis, or any other kind of emotional distress, get help right away. You can:    Call the Suicide and Crisis Lifeline at 994.     Call 6-997-524-TALK (1-366.692.5690).     Text HOME to 161791 to access the Crisis Text Line.   Consider saving these numbers in your phone.  Go to [email or web address] for more information or to chat online.  Call your doctor now or seek immediate medical care if:    You are having withdrawal symptoms. These may include nausea or vomiting, sweating, shakiness, and anxiety.   Watch closely for changes in your health, and be sure to contact your doctor if:    You have a relapse.     You need more help or support to stop.   Where can you learn more?  Go to https://www.healthwise.net/patiented  Enter H573 in  "the search box to learn more about \"Substance Use Disorder: Care Instructions.\"  Current as of: November 15, 2023  Content Version: 14.2 2024 [facility] [employer] [facility].   Care instructions adapted under license by your healthcare professional. If you have questions about a medical condition or this instruction, always ask your healthcare professional. [employer] disclaims any warranty or liability for your use of this information.       "

## 2024-12-11 NOTE — ASSESSMENT & PLAN NOTE
A1c 6/2024 was 5.8.  Information on prediabetes was put in the chart.  We discussed using metformin off label to prevent progression to diabetes.  She recently lost weight so declined metformin and wanted to recheck A1c at the 6 month denise.

## 2024-12-11 NOTE — ASSESSMENT & PLAN NOTE
Dyslipidemia, repeat lipids, healthy lifestyle discussed.   Statin recommended. Order placed for crestor 5 mg po q day.   Coronary calcium score discussed.     The 10-year ASCVD risk score (Nicole NEWELL, et al., 2019) is: 7.5%    Values used to calculate the score:      Age: 69 years      Sex: Female      Is Non- : No      Diabetic: No      Tobacco smoker: No      Systolic Blood Pressure: 137 mmHg      Is BP treated: No      HDL Cholesterol: 73 mg/dL      Total Cholesterol: 225 mg/dL

## 2024-12-18 ENCOUNTER — LAB (OUTPATIENT)
Dept: LAB | Facility: CLINIC | Age: 69
End: 2024-12-18
Payer: COMMERCIAL

## 2024-12-18 DIAGNOSIS — R73.03 PREDIABETES: ICD-10-CM

## 2024-12-18 LAB
EST. AVERAGE GLUCOSE BLD GHB EST-MCNC: 103 MG/DL
HBA1C MFR BLD: 5.2 % (ref 0–5.6)

## 2024-12-18 PROCEDURE — 36415 COLL VENOUS BLD VENIPUNCTURE: CPT

## 2024-12-18 PROCEDURE — 83036 HEMOGLOBIN GLYCOSYLATED A1C: CPT

## 2024-12-21 DIAGNOSIS — K76.0 HEPATIC STEATOSIS: Primary | ICD-10-CM

## 2024-12-21 DIAGNOSIS — Z11.59 ENCOUNTER FOR SCREENING FOR OTHER VIRAL DISEASES: ICD-10-CM

## 2024-12-21 NOTE — PROGRESS NOTES
Fibroscan and additional tests td up but not signed as patient needs to sign ABN Prior to me being able to sign orders. Please let her know that the tests were recommended by e consult hepatology and id like to do them but we need abn signed as insurance may not cover.

## 2025-01-10 PROBLEM — E66.3 OVERWEIGHT WITH BODY MASS INDEX (BMI) OF 27 TO 27.9 IN ADULT: Status: ACTIVE | Noted: 2017-06-20

## 2025-02-04 ENCOUNTER — ANCILLARY PROCEDURE (OUTPATIENT)
Dept: ULTRASOUND IMAGING | Facility: CLINIC | Age: 70
End: 2025-02-04
Attending: FAMILY MEDICINE
Payer: COMMERCIAL

## 2025-02-04 DIAGNOSIS — K76.0 HEPATIC STEATOSIS: ICD-10-CM

## 2025-02-04 PROCEDURE — 76981 USE PARENCHYMA: CPT | Performed by: RADIOLOGY

## 2025-04-07 ENCOUNTER — ANCILLARY PROCEDURE (OUTPATIENT)
Dept: MAMMOGRAPHY | Facility: CLINIC | Age: 70
End: 2025-04-07
Attending: OBSTETRICS & GYNECOLOGY
Payer: COMMERCIAL

## 2025-04-07 DIAGNOSIS — Z12.31 VISIT FOR SCREENING MAMMOGRAM: ICD-10-CM

## 2025-04-07 PROCEDURE — 77063 BREAST TOMOSYNTHESIS BI: CPT

## 2025-04-07 PROCEDURE — 77067 SCR MAMMO BI INCL CAD: CPT

## 2025-06-08 ENCOUNTER — HEALTH MAINTENANCE LETTER (OUTPATIENT)
Age: 70
End: 2025-06-08

## 2025-07-21 ENCOUNTER — TRANSFERRED RECORDS (OUTPATIENT)
Dept: HEALTH INFORMATION MANAGEMENT | Facility: CLINIC | Age: 70
End: 2025-07-21
Payer: COMMERCIAL